# Patient Record
Sex: FEMALE | Race: WHITE | NOT HISPANIC OR LATINO | Employment: UNEMPLOYED | URBAN - METROPOLITAN AREA
[De-identification: names, ages, dates, MRNs, and addresses within clinical notes are randomized per-mention and may not be internally consistent; named-entity substitution may affect disease eponyms.]

---
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
## 2019-01-05 LAB — EKG IMPRESSION: NORMAL

## 2020-07-03 ENCOUNTER — HOSPITAL ENCOUNTER (EMERGENCY)
Facility: MEDICAL CENTER | Age: 20
End: 2020-07-04
Attending: EMERGENCY MEDICINE
Payer: COMMERCIAL

## 2020-07-03 ENCOUNTER — APPOINTMENT (OUTPATIENT)
Dept: RADIOLOGY | Facility: MEDICAL CENTER | Age: 20
End: 2020-07-03
Attending: EMERGENCY MEDICINE
Payer: COMMERCIAL

## 2020-07-03 DIAGNOSIS — N39.0 URINARY TRACT INFECTION WITH HEMATURIA, SITE UNSPECIFIED: Primary | ICD-10-CM

## 2020-07-03 DIAGNOSIS — R31.9 URINARY TRACT INFECTION WITH HEMATURIA, SITE UNSPECIFIED: Primary | ICD-10-CM

## 2020-07-03 LAB
APPEARANCE UR: ABNORMAL
BACTERIA #/AREA URNS HPF: ABNORMAL /HPF
BILIRUB UR QL STRIP.AUTO: NEGATIVE
CAOX CRY #/AREA URNS HPF: ABNORMAL /HPF
COLOR UR: YELLOW
EPI CELLS #/AREA URNS HPF: ABNORMAL /HPF
GLUCOSE UR STRIP.AUTO-MCNC: NEGATIVE MG/DL
HCG UR QL: NEGATIVE
HYALINE CASTS #/AREA URNS LPF: ABNORMAL /LPF
KETONES UR STRIP.AUTO-MCNC: ABNORMAL MG/DL
LEUKOCYTE ESTERASE UR QL STRIP.AUTO: ABNORMAL
MICRO URNS: ABNORMAL
NITRITE UR QL STRIP.AUTO: NEGATIVE
PH UR STRIP.AUTO: 5.5 [PH] (ref 5–8)
PROT UR QL STRIP: 30 MG/DL
RBC # URNS HPF: ABNORMAL /HPF
RBC UR QL AUTO: ABNORMAL
SP GR UR STRIP.AUTO: 1.03
UROBILINOGEN UR STRIP.AUTO-MCNC: 0.2 MG/DL
WBC #/AREA URNS HPF: ABNORMAL /HPF

## 2020-07-03 PROCEDURE — 96374 THER/PROPH/DIAG INJ IV PUSH: CPT

## 2020-07-03 PROCEDURE — 96375 TX/PRO/DX INJ NEW DRUG ADDON: CPT

## 2020-07-03 PROCEDURE — 36415 COLL VENOUS BLD VENIPUNCTURE: CPT

## 2020-07-03 PROCEDURE — 87086 URINE CULTURE/COLONY COUNT: CPT

## 2020-07-03 PROCEDURE — 81001 URINALYSIS AUTO W/SCOPE: CPT

## 2020-07-03 PROCEDURE — 99284 EMERGENCY DEPT VISIT MOD MDM: CPT

## 2020-07-03 PROCEDURE — 700111 HCHG RX REV CODE 636 W/ 250 OVERRIDE (IP): Performed by: EMERGENCY MEDICINE

## 2020-07-03 PROCEDURE — 81025 URINE PREGNANCY TEST: CPT

## 2020-07-03 RX ORDER — HYDROMORPHONE HYDROCHLORIDE 1 MG/ML
0.5 INJECTION, SOLUTION INTRAMUSCULAR; INTRAVENOUS; SUBCUTANEOUS ONCE
Status: COMPLETED | OUTPATIENT
Start: 2020-07-03 | End: 2020-07-03

## 2020-07-03 RX ADMIN — HYDROMORPHONE HYDROCHLORIDE 0.5 MG: 1 INJECTION, SOLUTION INTRAMUSCULAR; INTRAVENOUS; SUBCUTANEOUS at 22:23

## 2020-07-03 RX ADMIN — HYDROMORPHONE HYDROCHLORIDE 0.5 MG: 1 INJECTION, SOLUTION INTRAMUSCULAR; INTRAVENOUS; SUBCUTANEOUS at 22:47

## 2020-07-03 ASSESSMENT — FIBROSIS 4 INDEX: FIB4 SCORE: 0.36

## 2020-07-04 VITALS
HEIGHT: 66 IN | BODY MASS INDEX: 18.49 KG/M2 | OXYGEN SATURATION: 99 % | TEMPERATURE: 98.7 F | RESPIRATION RATE: 20 BRPM | HEART RATE: 96 BPM | WEIGHT: 115.08 LBS | DIASTOLIC BLOOD PRESSURE: 75 MMHG | SYSTOLIC BLOOD PRESSURE: 149 MMHG

## 2020-07-04 PROCEDURE — 700102 HCHG RX REV CODE 250 W/ 637 OVERRIDE(OP): Performed by: EMERGENCY MEDICINE

## 2020-07-04 PROCEDURE — 700111 HCHG RX REV CODE 636 W/ 250 OVERRIDE (IP): Performed by: EMERGENCY MEDICINE

## 2020-07-04 PROCEDURE — A9270 NON-COVERED ITEM OR SERVICE: HCPCS | Performed by: EMERGENCY MEDICINE

## 2020-07-04 PROCEDURE — 74176 CT ABD & PELVIS W/O CONTRAST: CPT

## 2020-07-04 RX ORDER — SULFAMETHOXAZOLE AND TRIMETHOPRIM 800; 160 MG/1; MG/1
2 TABLET ORAL ONCE
Status: COMPLETED | OUTPATIENT
Start: 2020-07-04 | End: 2020-07-04

## 2020-07-04 RX ORDER — KETOROLAC TROMETHAMINE 30 MG/ML
15 INJECTION, SOLUTION INTRAMUSCULAR; INTRAVENOUS ONCE
Status: COMPLETED | OUTPATIENT
Start: 2020-07-04 | End: 2020-07-04

## 2020-07-04 RX ORDER — SULFAMETHOXAZOLE AND TRIMETHOPRIM 800; 160 MG/1; MG/1
1 TABLET ORAL 2 TIMES DAILY
Qty: 20 TAB | Refills: 0 | Status: SHIPPED | OUTPATIENT
Start: 2020-07-04 | End: 2020-07-14

## 2020-07-04 RX ADMIN — KETOROLAC TROMETHAMINE 15 MG: 30 INJECTION, SOLUTION INTRAMUSCULAR at 00:40

## 2020-07-04 RX ADMIN — SULFAMETHOXAZOLE AND TRIMETHOPRIM 2 TABLET: 800; 160 TABLET ORAL at 00:39

## 2020-07-04 ASSESSMENT — ENCOUNTER SYMPTOMS
WHEEZING: 0
HALLUCINATIONS: 0
DIARRHEA: 0
HEARTBURN: 0
WEIGHT LOSS: 0
SHORTNESS OF BREATH: 0
DIZZINESS: 0
VOMITING: 0
SINUS PAIN: 0
SENSORY CHANGE: 0
BLURRED VISION: 0
FALLS: 0
FEVER: 0
WEAKNESS: 0
PALPITATIONS: 0
FOCAL WEAKNESS: 0
HEMOPTYSIS: 0
ORTHOPNEA: 0
SORE THROAT: 0
CONSTIPATION: 0
HEADACHES: 1
NERVOUS/ANXIOUS: 1
COUGH: 0
CHILLS: 0
MYALGIAS: 0
TREMORS: 0
ABDOMINAL PAIN: 0
NECK PAIN: 0
DIAPHORESIS: 0
FLANK PAIN: 1
SPUTUM PRODUCTION: 0
DOUBLE VISION: 0
TINGLING: 0
BACK PAIN: 1
SEIZURES: 0
NAUSEA: 0

## 2020-07-04 ASSESSMENT — LIFESTYLE VARIABLES: SUBSTANCE_ABUSE: 1

## 2020-07-04 NOTE — ED NOTES
Pt reports pain has decreased a little, currently 7/10. Pt remains restless, tearful. ERP informed. Further orders received. Pt medicated per ERP orders.

## 2020-07-04 NOTE — ED NOTES
Pt calm, resting quietly with friend at BS. Pt denies needs at this time. Updated on POC. Awaiting CT. Call light in reach.

## 2020-07-04 NOTE — ED NOTES
Discharge instructions provided with prescription teaching, instructed not to drive, pt verbalizes understanding. Pt is awake, alert, VSS. Pt ambulatory with steady gait out of ER with friend.

## 2020-07-04 NOTE — ED PROVIDER NOTES
ED Provider Note   7/4/2020  12:32 AM    Means of Arrival: Walk In  History obtained by: patient  Limitations:none    CHIEF COMPLAINT  Chief Complaint   Patient presents with   • Low Back Pain     Sudden onset lower back pain starting 20 minutes ago. hx: kidney stone, kidney infections       HPI  Alecia Keller is a 19 y.o. female with past medical history of kidney stone, urinary tract infections, asthma , anxiety who presents to the emergency department with sudden onset low back pain that started around half an hour before arriving to the ED. patient in acute distress due to the back pain, states that left side flank pain more than the right.  She states that she has a previous history of kidney infection and kidney stones.  Denies dysuria or lower abdominal pain.  Denies nausea or vomiting taking oral contraceptives 2 months back and has last mental cycle a week before.  Denies pregnancy.  No chest pain, shortness of breath or history of STDs.    Patient has a history of alcohol use, last drink this morning 3 shots of tequila.  She also smokes marijuana almost every day and smokes around half a pack of cigarettes.    REVIEW OF SYSTEMS  Review of Systems   Constitutional: Negative for chills, diaphoresis, fever and weight loss.   HENT: Negative for congestion, hearing loss, sinus pain and sore throat.    Eyes: Negative for blurred vision and double vision.   Respiratory: Negative for cough, hemoptysis, sputum production, shortness of breath and wheezing.    Cardiovascular: Negative for chest pain, palpitations, orthopnea and leg swelling.   Gastrointestinal: Negative for abdominal pain, constipation, diarrhea, heartburn, nausea and vomiting.   Genitourinary: Positive for flank pain and urgency. Negative for dysuria, frequency and hematuria.   Musculoskeletal: Positive for back pain. Negative for falls, joint pain, myalgias and neck pain.   Skin: Negative for itching and rash.   Neurological: Positive for  "headaches. Negative for dizziness, tingling, tremors, sensory change, focal weakness, seizures and weakness.   Psychiatric/Behavioral: Positive for substance abuse. Negative for hallucinations and suicidal ideas. The patient is nervous/anxious.      See HPI for further details.     PAST MEDICAL HISTORY   has a past medical history of Allergy, Anxiety, ASTHMA (11/3/2011), Asthma, Pleurisy (2008), and Pneumonia (2008).    SOCIAL HISTORY  Social History     Tobacco Use   • Smoking status: Current Every Day Smoker     Packs/day: 1.00     Years: 0.50     Pack years: 0.50     Types: Cigarettes   • Smokeless tobacco: Never Used   Substance and Sexual Activity   • Alcohol use: Yes   • Drug use: Yes     Types: Marijuana, Methamphetamines, Cocaine     Comment: cocaine, weed   • Sexual activity: Yes     Partners: Male     Birth control/protection: Pill, Condom       SURGICAL HISTORY  patient denies any surgical history    CURRENT MEDICATIONS  Home Medications     Reviewed by Jason Pinzon R.N. (Registered Nurse) on 07/03/20 at 2159  Med List Status: Partial   Medication Last Dose Status   albuterol 108 (90 Base) MCG/ACT Aero Soln inhalation aerosol  Active   albuterol 108 (90 Base) MCG/ACT Aero Soln inhalation aerosol  Active   azithromycin (ZITHROMAX) 250 MG Tab  Active   betamethasone dipropionate (DIPROLENE) 0.05 % Cream  Active   NON SPECIFIED  Active   NS SOLN 60 mL with albuterol 2.5 mg/0.5 mL NEBU 5 mL  Active   predniSONE (DELTASONE) 20 MG Tab  Active                ALLERGIES  Allergies   Allergen Reactions   • Pcn [Penicillins] Hives   • Pcn [Penicillins]    • Pineapple        PHYSICAL EXAM  VITAL SIGNS: /75   Pulse 96   Temp 37.1 °C (98.7 °F) (Temporal)   Resp 20   Ht 1.676 m (5' 6\")   Wt 52.2 kg (115 lb 1.3 oz)   LMP 06/19/2020   SpO2 99%   BMI 18.57 kg/m²    Pulse ox interpretation: I interpret this pulse ox as normal.   Physical Exam   Constitutional: She is oriented to person, place, and time. "   Young well-nourished female, tearful and very anxious   HENT:   Head: Normocephalic and atraumatic.   Eyes: Pupils are equal, round, and reactive to light. EOM are normal.   Neck: Normal range of motion. Neck supple.   Cardiovascular: Regular rhythm, normal heart sounds and intact distal pulses.   No murmur heard.  Tachycardic   Pulmonary/Chest: Effort normal and breath sounds normal. No respiratory distress. She has no wheezes. She has no rales.   Abdominal: Soft. Bowel sounds are normal. She exhibits no distension. There is no abdominal tenderness. There is no rebound.   Musculoskeletal: Normal range of motion.         General: No tenderness or edema.      Comments: Patient -diffuse tenderness in low back.   CVA tenderness left more than right   Neurological: She is alert and oriented to person, place, and time. No cranial nerve deficit. Gait normal. Coordination normal.   Skin: Skin is warm.   Psychiatric:    anxious and tearful         COURSE & MEDICAL DECISION MAKING  Pertinent Labs & Imaging studies reviewed. (See chart for details)    12:32 AM This is an emergent evaluation of a 19 y.o., female who presents with sudden onset low back pain, left side more than the right.  Past medical history of urinary infections and kidney stones.. Physical exam significant for tenderness in low back with left CVA tenderness. the differential diagnosis includes but is not limited to urinary tract infection with pyelonephritis, renal stones, ruptured ovarian cyst, ectopic pregnancy. Ordered for urinalysis , pregnancy test, CT renal colic .  UA was positive for packed leukocytes, leukocyte esterase and bacteria along with calcium oxalate crystals.  Pregnancy test was negative.  CT renal did not show any kidney stones or hydronephrosis .patient is recommended further work-up and hospitalization for the treatment of urinary infection possible pyelonephritis patient refused to stay.patient is afebrile, tolerating oral fluids  and is stable.  Patient was given a dose of Toradol for pain .patient has allergy to penicillin so Bactrim DS was given in the ED. She is discharged to home with Bactrim DS twice daily  for total 10 days    Bell Keita M.D.      I independently evaluated the patient and repeated the important components of the history and physical. I discussed the management with the resident. I have reviewed and agree with the pertinent clinical information as above including history, exam, study findings and recommendations.    This was an emergent evaluation of a 19-year-old woman presenting with acute onset left sided flank pain.  She says that the pain started suddenly.  Different diagnosis included renal stone, ectopic pregnancy, ruptured ovarian cyst, pyelonephritis.  The UA was consistent with infection but given the acute onset of pain a CT was done to look for any signs of stones.  There was no signs of renal stones or nephrosis.  Is possible that she passed a stone because her urine had a significant amount of calcium oxalate crystals.  When I reevaluated her she told me she adamantly wanted to leave.  I was considering adding serum studies to further evaluate infectious presentation, possible sepsis.  Because of her strong will to leave the emergency department she was agreeable to take oral Bactrim.  She was given Bactrim DS prior to discharge.  She was given a prescription for Bactrim DS for total of 10 days.  She agrees to return if she developed fever, frequent vomiting or any other serious concerns.  Enzo Ricci II, M.D. 7/4/2020 4:07 AM       The patient will return for worsening symptoms and is stable at the time of discharge. The patient verbalizes understanding. Guidance was provided on appropriate use of medications including driving under the influence, overdose, and side effects.     FINAL IMPRESSION  1. Urinary tract infection with hematuria, site unspecified                 Electronically signed by:  Enzo Ricci II, M.D., 7/4/2020 12:32 AM

## 2020-07-04 NOTE — ED TRIAGE NOTES
"Alecia Keller  19 y.o. female  Chief Complaint   Patient presents with   • Low Back Pain     lower back pain starting 20 minutes ago. hx: kidney stone, kidney infections     Pt ambulated to triage with steady gait for above complaint.      Pt unable to sit still in triage, walking around the room reporting a lot of pain.     Pt denies travel outside [facility] in the past 14 days, and denies respiratory symptoms.     Charge RN notified of patient. Pt roomed to [place] [id number].     Blood Pressure: (unable/refused), Pulse: (!) 120, Respiration: 16, Temperature: 36.7 °C (98.1 °F), Height: 167.6 cm (5' 6\"), Weight: 52.2 kg (115 lb 1.3 oz), BMI (Calculated): 18.57, BSA (Calculated): 1.6, Pulse Oximetry: 97 %    "

## 2020-07-06 LAB
BACTERIA UR CULT: NORMAL
SIGNIFICANT IND 70042: NORMAL
SITE SITE: NORMAL
SOURCE SOURCE: NORMAL

## 2021-08-26 ENCOUNTER — HOSPITAL ENCOUNTER (EMERGENCY)
Facility: MEDICAL CENTER | Age: 21
End: 2021-08-26
Attending: EMERGENCY MEDICINE
Payer: COMMERCIAL

## 2021-08-26 VITALS
HEART RATE: 109 BPM | OXYGEN SATURATION: 98 % | DIASTOLIC BLOOD PRESSURE: 65 MMHG | SYSTOLIC BLOOD PRESSURE: 113 MMHG | WEIGHT: 110 LBS | TEMPERATURE: 98.4 F | HEIGHT: 66 IN | BODY MASS INDEX: 17.68 KG/M2 | RESPIRATION RATE: 18 BRPM

## 2021-08-26 DIAGNOSIS — L02.91 ABSCESS: ICD-10-CM

## 2021-08-26 DIAGNOSIS — L03.113 CELLULITIS OF RIGHT UPPER EXTREMITY: ICD-10-CM

## 2021-08-26 DIAGNOSIS — F19.90 IVDU (INTRAVENOUS DRUG USER): ICD-10-CM

## 2021-08-26 PROCEDURE — 700102 HCHG RX REV CODE 250 W/ 637 OVERRIDE(OP): Performed by: EMERGENCY MEDICINE

## 2021-08-26 PROCEDURE — 700101 HCHG RX REV CODE 250: Performed by: EMERGENCY MEDICINE

## 2021-08-26 PROCEDURE — 99283 EMERGENCY DEPT VISIT LOW MDM: CPT

## 2021-08-26 PROCEDURE — 303977 HCHG I & D

## 2021-08-26 PROCEDURE — A9270 NON-COVERED ITEM OR SERVICE: HCPCS | Performed by: EMERGENCY MEDICINE

## 2021-08-26 RX ORDER — SULFAMETHOXAZOLE AND TRIMETHOPRIM 800; 160 MG/1; MG/1
1 TABLET ORAL 2 TIMES DAILY
Qty: 20 TABLET | Refills: 0 | Status: SHIPPED | OUTPATIENT
Start: 2021-08-26 | End: 2021-09-05

## 2021-08-26 RX ORDER — SULFAMETHOXAZOLE AND TRIMETHOPRIM 800; 160 MG/1; MG/1
1 TABLET ORAL ONCE
Status: COMPLETED | OUTPATIENT
Start: 2021-08-26 | End: 2021-08-26

## 2021-08-26 RX ORDER — CEPHALEXIN 500 MG/1
500 CAPSULE ORAL ONCE
Status: COMPLETED | OUTPATIENT
Start: 2021-08-26 | End: 2021-08-26

## 2021-08-26 RX ORDER — LIDOCAINE HYDROCHLORIDE AND EPINEPHRINE BITARTRATE 20; .01 MG/ML; MG/ML
10 INJECTION, SOLUTION SUBCUTANEOUS ONCE
Status: COMPLETED | OUTPATIENT
Start: 2021-08-26 | End: 2021-08-26

## 2021-08-26 RX ORDER — CEPHALEXIN 500 MG/1
500 CAPSULE ORAL 4 TIMES DAILY
Qty: 40 CAPSULE | Refills: 0 | Status: SHIPPED | OUTPATIENT
Start: 2021-08-26 | End: 2021-09-05

## 2021-08-26 RX ADMIN — CEPHALEXIN 500 MG: 500 CAPSULE ORAL at 05:07

## 2021-08-26 RX ADMIN — SULFAMETHOXAZOLE AND TRIMETHOPRIM 1 TABLET: 800; 160 TABLET ORAL at 05:07

## 2021-08-26 RX ADMIN — LIDOCAINE HYDROCHLORIDE AND EPINEPHRINE 10 ML: 20; 10 INJECTION, SOLUTION INFILTRATION; PERINEURAL at 05:00

## 2021-08-26 NOTE — ED NOTES
Discharge teaching and paperwork provided. All questions/concerns answered. VSS, assessment stable. Given information regarding prescriptions. Patient discharged to the care of self and ambulated out of the ED with steady gait.

## 2021-08-26 NOTE — ED TRIAGE NOTES
"Alecia Keller  21 y.o. female  Chief Complaint   Patient presents with   • Abscess     R arm abscess; states \"i think its from the tattoo\"; indicates been present x2 weeks when \"I last injected heroin\"; tender to touch.       Vitals:    08/26/21 0258   BP: 112/80   Pulse: (!) 124   Resp: 20   Temp: 37.8 °C (100.1 °F)   SpO2: 96%        Patient ambulatory into triage for the complaint above or R arm abscess - pt reports that they think \"it happened when I got my tattoo.\" In triage pt indicated that they have been \"sober\" for 2 weeks; ironically abscess started when patient reports last using heroin and was injecting at this time. Pt also admits to doing meth, but only smoking meth. Pt reported that the abscess did appear to get smaller; presents today due to increasing pain and increasing size. Tattoo looks to be well healed and older than 2 weeks. Area is reddened and large.    Patient is in stable condition at time of triage, has been educated on the triage process and placed back into the ED lobby at this time - pt has verbalized understanding of this process.     RN encouraged patient to alert staff at front for any changes that may occur while they are waiting to be evaluated by an ERP.     Of note, this RN and patient are in proper PPE and has a mask in place at all times during this encounter.     Past Medical History:   Diagnosis Date   • Allergy    • Anxiety    • ASTHMA 11/3/2011   • Asthma    • Pleurisy 2008   • Pneumonia 2008        "

## 2021-08-26 NOTE — ED PROVIDER NOTES
"ED Provider Note    Scribed for Kel Cross M.D. by Josue Cortez-Reyes. 8/26/2021, 4:11 AM.    Primary care provider: JENNIFER Lanier  Means of arrival: Walk-in  History obtained from: Patient  History limited by: None    CHIEF COMPLAINT  Chief Complaint   Patient presents with    Abscess     R arm abscess; states \"i think its from the tattoo\"; indicates been present x2 weeks when \"I last injected heroin\"; tender to touch.       HPI  Alecia Keller is a 21 y.o. female who presents to the Emergency Department for evaluation of an abscess on her right forearm onset two weeks ago. The patient endorses associated pain noting that her pain radiates to her armpit. She declares that her abscess developed when she last injected heroin two weeks ago. The patient adds that she has been applying rubbing alcohol and hydrogen peroxide to the abscess with no alleviation of her symptoms.     REVIEW OF SYSTEMS  Pertinent positives include abscess to right forearm and associated pain.   Pertinent negatives include no other pain or illness.       PAST MEDICAL HISTORY   has a past medical history of Allergy, Anxiety, ASTHMA (11/3/2011), Asthma, Pleurisy (2008), and Pneumonia (2008).    SURGICAL HISTORY  patient denies any surgical history    SOCIAL HISTORY  Social History     Tobacco Use    Smoking status: Current Every Day Smoker     Packs/day: 1.00     Years: 0.50     Pack years: 0.50     Types: Cigarettes    Smokeless tobacco: Never Used   Vaping Use    Vaping Use: Never used   Substance Use Topics    Alcohol use: Yes    Drug use: Yes     Types: Marijuana, Methamphetamines, Cocaine, Intravenous, Inhaled     Comment: cocaine, weed, heroin, meth      Social History     Substance and Sexual Activity   Drug Use Yes    Types: Marijuana, Methamphetamines, Cocaine, Intravenous, Inhaled    Comment: cocaine, weed, heroin, meth       FAMILY HISTORY  Family History   Problem Relation Age of Onset    Other Mother         liver disease " "   Diabetes Father     Hypertension Father     Cancer Maternal Grandfather         mult myeloma    Heart Disease Neg Hx     Hyperlipidemia Neg Hx     Stroke Neg Hx        CURRENT MEDICATIONS  Home Medications       Reviewed by Prasanth Rapp R.N. (Registered Nurse) on 08/26/21 at 0319  Med List Status: Not Addressed     Medication Last Dose Status   albuterol 108 (90 Base) MCG/ACT Aero Soln inhalation aerosol  Active   albuterol 108 (90 Base) MCG/ACT Aero Soln inhalation aerosol  Active   azithromycin (ZITHROMAX) 250 MG Tab  Active   betamethasone dipropionate (DIPROLENE) 0.05 % Cream  Active   NON SPECIFIED  Active   NS SOLN 60 mL with albuterol 2.5 mg/0.5 mL NEBU 5 mL  Active   predniSONE (DELTASONE) 20 MG Tab  Active                    ALLERGIES  Allergies   Allergen Reactions    Pcn [Penicillins] Hives    Pcn [Penicillins]     Pineapple        PHYSICAL EXAM  VITAL SIGNS: /80   Pulse (!) 124   Temp 37.8 °C (100.1 °F) (Temporal)   Resp 20   Ht 1.676 m (5' 6\")   Wt 49.9 kg (110 lb)   LMP 07/26/2021 (Approximate)   SpO2 96%   BMI 17.75 kg/m²     Nursing note and vitals reviewed.  Constitutional: No distress.   HENT: Head is atraumatic. Oropharynx is moist.   Eyes: Conjunctivae are normal. Pupils are equal, round, and reactive to light.   Cardiovascular: Normal peripheral perfusion  Respiratory: No respiratory distress.   Musculoskeletal: Normal range of motion. Golf sized abscess to ulnar aspect of right forearm.   Neurological: Alert. No focal deficits noted.    Skin: No rash.   Psych: Appropriate for clinical situation       DIAGNOSTIC STUDIES / PROCEDURES    Incision and Drainage Procedure Note    Indication: Abscess    Procedure: The patient was positioned appropriately and the skin over the incision site was prepped with betadine and draped in a sterile fashion. Local anesthesia was obtained by infiltration using 2% Lidocaine with epinephrine.  An incision was then made over the center of the " lesion and approximately 5 cc of purulent material was expressed. Loculations were broken up using forceps and more of the material was able to be expressed. The drainage cavity was then packed with sterile gauze. The patient’s tetanus status was up to date and did not require a booster dose.    The patient tolerated the procedure well.    Complications: None        COURSE & MEDICAL DECISION MAKING  Nursing notes, VS, PMSFHx reviewed in chart.    4:11 AM - Patient seen and examined at bedside. Patient will be treated with lidocaine-epinephrine 2 %. I informed the patient of my plan to drain her abscess. Patient verbalizes understanding and agreement to this plan of care.     4:52 AM - Incision and drainage procedure performed by me as outlined in the procedure not above. I informed the patient of my plan to treat her with antibiotics. I discussed plan for discharge and follow up as outlined below. The patient verbalizes they feel comfortable going home. The patient is stable for discharge at this time and will return for any new or worsening symptoms. Patient verbalizes understanding and support with my plan for discharge.       Patient will be discharged home with a prescription for Bactrim and Keflex.    DISPOSITION:  Patient will be discharged home in stable condition.    FOLLOW UP:  [facility], Emergency Dept  [address] [place] [id number] [phone]    If symptoms worsen    [facility], Emergency Dept  [address] [place] [id number] [phone]  In 2 days  For wound re-check    CAIO Lanier.  [address] [place] [id number] [phone]    Schedule an appointment as soon as possible for a visit         The patient was discharged home with an information sheet on abscess and told to return immediately for any signs or symptoms listed.  The patient agreed to the discharge precautions and follow-up plan which is documented in  EPIC.    FINAL IMPRESSION  1. Abscess    2. Cellulitis of right upper extremity    3. IVDU (intravenous drug user)          I, Josue Cortez-Reyes (Scribe), am scribing for, and in the presence of, Kel Cross M.D..    Electronically signed by: Josue Cortez-Reyes (Scribe), 8/26/2021    IKel M.D. personally performed the services described in this documentation, as scribed by Josue Cortez-Reyes in my presence, and it is both accurate and complete. E.     The note accurately reflects work and decisions made by me.  Kel Cross M.D.  8/26/2021  10:53 AM

## 2022-05-24 ENCOUNTER — HOSPITAL ENCOUNTER (EMERGENCY)
Facility: MEDICAL CENTER | Age: 22
End: 2022-05-24
Attending: EMERGENCY MEDICINE
Payer: COMMERCIAL

## 2022-05-24 VITALS
SYSTOLIC BLOOD PRESSURE: 115 MMHG | HEIGHT: 66 IN | DIASTOLIC BLOOD PRESSURE: 72 MMHG | HEART RATE: 89 BPM | BODY MASS INDEX: 20.41 KG/M2 | TEMPERATURE: 99 F | WEIGHT: 127 LBS | RESPIRATION RATE: 8 BRPM | OXYGEN SATURATION: 96 %

## 2022-05-24 DIAGNOSIS — F15.10 AMPHETAMINE ABUSE (HCC): ICD-10-CM

## 2022-05-24 LAB
ALBUMIN SERPL BCP-MCNC: 4 G/DL (ref 3.2–4.9)
ALBUMIN/GLOB SERPL: 1.7 G/DL
ALP SERPL-CCNC: 83 U/L (ref 30–99)
ALT SERPL-CCNC: 43 U/L (ref 2–50)
ANION GAP SERPL CALC-SCNC: 10 MMOL/L (ref 7–16)
AST SERPL-CCNC: 47 U/L (ref 12–45)
BILIRUB SERPL-MCNC: 0.2 MG/DL (ref 0.1–1.5)
BUN SERPL-MCNC: 14 MG/DL (ref 8–22)
CALCIUM SERPL-MCNC: 8.6 MG/DL (ref 8.5–10.5)
CHLORIDE SERPL-SCNC: 107 MMOL/L (ref 96–112)
CO2 SERPL-SCNC: 24 MMOL/L (ref 20–33)
CREAT SERPL-MCNC: 0.91 MG/DL (ref 0.5–1.4)
GFR SERPLBLD CREATININE-BSD FMLA CKD-EPI: 91 ML/MIN/1.73 M 2
GLOBULIN SER CALC-MCNC: 2.3 G/DL (ref 1.9–3.5)
GLUCOSE SERPL-MCNC: 88 MG/DL (ref 65–99)
HCG SERPL QL: NEGATIVE
POTASSIUM SERPL-SCNC: 3.9 MMOL/L (ref 3.6–5.5)
PROT SERPL-MCNC: 6.3 G/DL (ref 6–8.2)
SODIUM SERPL-SCNC: 141 MMOL/L (ref 135–145)

## 2022-05-24 PROCEDURE — 99285 EMERGENCY DEPT VISIT HI MDM: CPT

## 2022-05-24 PROCEDURE — 36415 COLL VENOUS BLD VENIPUNCTURE: CPT

## 2022-05-24 PROCEDURE — 80053 COMPREHEN METABOLIC PANEL: CPT

## 2022-05-24 PROCEDURE — 700105 HCHG RX REV CODE 258: Performed by: EMERGENCY MEDICINE

## 2022-05-24 PROCEDURE — 84703 CHORIONIC GONADOTROPIN ASSAY: CPT

## 2022-05-24 RX ORDER — SODIUM CHLORIDE 9 MG/ML
1000 INJECTION, SOLUTION INTRAVENOUS ONCE
Status: COMPLETED | OUTPATIENT
Start: 2022-05-24 | End: 2022-05-24

## 2022-05-24 RX ADMIN — SODIUM CHLORIDE 1000 ML: 9 INJECTION, SOLUTION INTRAVENOUS at 12:09

## 2022-05-24 NOTE — ED PROVIDER NOTES
ED Provider Note    CHIEF COMPLAINT  Chief Complaint   Patient presents with   • Drug Overdose     Pt states she smoked some fentanyl today and was found on the ground in the bathroom at the [facility]. No narcan was given. Pt AOx4, maintaining airway. Hx of drug abuse. Also admits to using meth today.        HPI  Alecia Keller is a 21 y.o. female who presents for evaluation of not feeling well today agitation.  The [place]  She apparently admitted to smoking which she thought was either methamphetamine or fentanyl.  No Narcan was administered in the field.  She denies chest pain or any strokelike symptoms.  She has a history of polysubstance abuse.  She denies fever chills or productive cough.    REVIEW OF SYSTEMS  See HPI for further details.  No night sweats weight loss numbness tingling weakness rash all other systems are negative.     PAST MEDICAL HISTORY  Past Medical History:   Diagnosis Date   • ASTHMA 11/3/2011   • Pleurisy 2008   • Pneumonia 2008   • Allergy    • Anxiety    • Asthma        FAMILY HISTORY  Noncontributory    SOCIAL HISTORY  Social History     Socioeconomic History   • Marital status: Single   Tobacco Use   • Smoking status: Current Every Day Smoker     Packs/day: 1.00     Years: 0.50     Pack years: 0.50     Types: Cigarettes   • Smokeless tobacco: Never Used   Vaping Use   • Vaping Use: Never used   Substance and Sexual Activity   • Alcohol use: Yes   • Drug use: Yes     Types: Marijuana, Methamphetamines, Cocaine, Intravenous, Inhaled     Comment: cocaine, weed, heroin, meth, fentanyl   • Sexual activity: Yes     Partners: Male     Birth control/protection: Pill, Condom   Other Topics Concern   • Bike safety Yes   Social History Narrative    ** Merged History Encounter **          Extensive history of polysubstance abuse  SURGICAL HISTORY  No past surgical history on file.    CURRENT MEDICATIONS  Home Medications     Reviewed by Deisy VELARDE  "LILLY Martinez (Registered Nurse) on 05/24/22 at 1135  Med List Status: Not Addressed   Medication Last Dose Status   albuterol 108 (90 Base) MCG/ACT Aero Soln inhalation aerosol  Active   albuterol 108 (90 Base) MCG/ACT Aero Soln inhalation aerosol  Active   azithromycin (ZITHROMAX) 250 MG Tab  Active   betamethasone dipropionate (DIPROLENE) 0.05 % Cream  Active   NON SPECIFIED  Active   NS SOLN 60 mL with albuterol 2.5 mg/0.5 mL NEBU 5 mL  Active   predniSONE (DELTASONE) 20 MG Tab  Active                ALLERGIES  Allergies   Allergen Reactions   • Pcn [Penicillins] Hives   • Pcn [Penicillins]    • Pineapple        PHYSICAL EXAM  VITAL SIGNS: /66   Pulse 78   Temp 37.2 °C (98.9 °F) (Temporal)   Resp (!) 8   Ht 1.676 m (5' 6\")   Wt 57.6 kg (127 lb)   SpO2 94%   BMI 20.50 kg/m²       Constitutional: Disheveled agitated  HENT: Normocephalic, Atraumatic, Bilateral external ears normal, Oropharynx moist, No oral exudates, Nose normal.   Eyes: PERRLA, EOMI, Conjunctiva normal, No discharge.   Neck: Normal range of motion, No tenderness, Supple, No stridor.   Cardiovascular: Mild tachycardia normal rhythm, No murmurs, No rubs, No gallops.   Thorax & Lungs: Normal breath sounds, No respiratory distress, No wheezing, No chest tenderness.   Abdomen: Bowel sounds normal, Soft, No tenderness, No masses, No pulsatile masses.   Skin: Warm, Dry, No erythema, No rash.   Back: No tenderness, No CVA tenderness.   Extremities: Intact distal pulses, No edema, No tenderness, No cyanosis, No clubbing.   Neurologic: Cranial nerves II through XII grossly intact alert & oriented x 3, Normal motor function, Normal sensory function, No focal deficits noted.   Psychiatric: Agitated but denies homicidality or suicidality does not appear to be acutely psychotic.       COURSE & MEDICAL DECISION MAKING  Pertinent Labs & Imaging studies reviewed. (See chart for details)  Results for orders placed or performed during the hospital " encounter of 05/24/22   Comp Metabolic Panel   Result Value Ref Range    Sodium 141 135 - 145 mmol/L    Potassium 3.9 3.6 - 5.5 mmol/L    Chloride 107 96 - 112 mmol/L    Co2 24 20 - 33 mmol/L    Anion Gap 10.0 7.0 - 16.0    Glucose 88 65 - 99 mg/dL    Bun 14 8 - 22 mg/dL    Creatinine 0.91 0.50 - 1.40 mg/dL    Calcium 8.6 8.5 - 10.5 mg/dL    AST(SGOT) 47 (H) 12 - 45 U/L    ALT(SGPT) 43 2 - 50 U/L    Alkaline Phosphatase 83 30 - 99 U/L    Total Bilirubin 0.2 0.1 - 1.5 mg/dL    Albumin 4.0 3.2 - 4.9 g/dL    Total Protein 6.3 6.0 - 8.2 g/dL    Globulin 2.3 1.9 - 3.5 g/dL    A-G Ratio 1.7 g/dL   HCG QUAL SERUM   Result Value Ref Range    Beta-Hcg Qualitative Serum Negative Negative   ESTIMATED GFR   Result Value Ref Range    GFR (CKD-EPI) 91 >60 mL/min/1.73 m 2     Patient was observed for several hours on cardiac and pulse oximetry monitoring.  She never had any episodes of desaturation a tacky dysrhythmia.  Metabolic panel and pregnancy are unremarkable.  She was repeatedly evaluated and did not endorse any homicidality or suicidality.  She admits to polysubstance abuse.  At the time of discharge the patient is alert and oriented x4 and ambulatory.  To be discharged to her own care    FINAL IMPRESSION  1.  Methamphetamine abuse         Electronically signed by: Francois Ross M.D., 5/24/2022 11:50 AM

## 2022-05-24 NOTE — ED NOTES
Patient remains drowsy and will open eyes for a few seconds. VSS on monitor, will continue to assess

## 2022-05-24 NOTE — ED TRIAGE NOTES
"Chief Complaint   Patient presents with   • Drug Overdose     Pt states she smoked some fentanyl today and was found on the ground in the bathroom at the [facility]. No narcan was given. Pt AOx4, maintaining airway. Hx of drug abuse. Also admits to using meth today.      /82   Pulse 97   Temp 37.2 °C (98.9 °F) (Temporal)   Resp 20   Ht 1.676 m (5' 6\")   Wt 57.6 kg (127 lb)   SpO2 96%   BMI 20.50 kg/m²     Pt brought in by [facility] to [facility] 26, mask in place. Pt in a gown and on monitor. Chart flagged for ERP to see.  "

## 2022-05-25 NOTE — ED NOTES
Pt has discharge orders. Pt educated on discharge instructions.  Pt verbalizes understanding.  PIV removed. Pt ambulatory to lobby. Pt going home with mother.

## 2023-02-17 ENCOUNTER — HOSPITAL ENCOUNTER (EMERGENCY)
Facility: MEDICAL CENTER | Age: 23
End: 2023-02-17
Attending: EMERGENCY MEDICINE
Payer: COMMERCIAL

## 2023-02-17 VITALS
HEART RATE: 84 BPM | DIASTOLIC BLOOD PRESSURE: 79 MMHG | RESPIRATION RATE: 16 BRPM | WEIGHT: 127 LBS | TEMPERATURE: 98 F | SYSTOLIC BLOOD PRESSURE: 130 MMHG | HEIGHT: 66 IN | OXYGEN SATURATION: 99 % | BODY MASS INDEX: 20.41 KG/M2

## 2023-02-17 DIAGNOSIS — K04.7 INFECTED DENTAL CARIES: ICD-10-CM

## 2023-02-17 DIAGNOSIS — K08.89 PAIN, DENTAL: ICD-10-CM

## 2023-02-17 DIAGNOSIS — K02.9 INFECTED DENTAL CARIES: ICD-10-CM

## 2023-02-17 PROCEDURE — A9270 NON-COVERED ITEM OR SERVICE: HCPCS | Performed by: EMERGENCY MEDICINE

## 2023-02-17 PROCEDURE — 99283 EMERGENCY DEPT VISIT LOW MDM: CPT

## 2023-02-17 PROCEDURE — 700102 HCHG RX REV CODE 250 W/ 637 OVERRIDE(OP): Performed by: EMERGENCY MEDICINE

## 2023-02-17 RX ORDER — IBUPROFEN 600 MG/1
600 TABLET ORAL ONCE
Status: COMPLETED | OUTPATIENT
Start: 2023-02-17 | End: 2023-02-17

## 2023-02-17 RX ORDER — CLINDAMYCIN HYDROCHLORIDE 300 MG/1
300 CAPSULE ORAL 3 TIMES DAILY
Qty: 21 CAPSULE | Refills: 0 | Status: ACTIVE | OUTPATIENT
Start: 2023-02-17 | End: 2023-02-24

## 2023-02-17 RX ORDER — CLINDAMYCIN HYDROCHLORIDE 150 MG/1
300 CAPSULE ORAL ONCE
Status: COMPLETED | OUTPATIENT
Start: 2023-02-17 | End: 2023-02-17

## 2023-02-17 RX ADMIN — CLINDAMYCIN HYDROCHLORIDE 300 MG: 150 CAPSULE ORAL at 09:09

## 2023-02-17 RX ADMIN — IBUPROFEN 600 MG: 600 TABLET, FILM COATED ORAL at 09:09

## 2023-02-17 ASSESSMENT — PAIN DESCRIPTION - PAIN TYPE
TYPE: ACUTE PAIN
TYPE: ACUTE PAIN

## 2023-02-17 ASSESSMENT — FIBROSIS 4 INDEX: FIB4 SCORE: 0.43

## 2023-02-17 NOTE — ED PROVIDER NOTES
"ED Provider Note    CHIEF COMPLAINT  Chief Complaint   Patient presents with    Dental Pain     X1 week - bottom left side       EXTERNAL RECORDS REVIEWED  Other acute pain, no prior recently for the same    HPI/ROS  LIMITATION TO HISTORY   Select: : None  OUTSIDE HISTORIAN(S):  Friend      Alecia Keller is a 22 y.o. female who presents to the emergency department for dental pain.  Patient describes left low molar pain progressive this week.  Radiating to the left side of her face.  Throbbing.  Decreased appetite but tolerating food and fluids.  No neck pain or stiffness.  No difficulty swallowing or breathing.  Nausea without vomiting.  Tactile fever and chills.    Patient has an appointment [facility] on March 24.      Allergic to penicillins, rash \"hives.\"  No longer using drugs, 7 months clean and sober.  Denies pregnancy.    PAST MEDICAL HISTORY   has a past medical history of Allergy, Anxiety, ASTHMA (11/3/2011), Asthma, Pleurisy (2008), and Pneumonia (2008).    SURGICAL HISTORY  patient denies any surgical history    FAMILY HISTORY  Family History   Problem Relation Age of Onset    Other Mother         liver disease    Diabetes Father     Hypertension Father     Cancer Maternal Grandfather         mult myeloma    Heart Disease Neg Hx     Hyperlipidemia Neg Hx     Stroke Neg Hx        SOCIAL HISTORY  Social History     Tobacco Use    Smoking status: Every Day     Packs/day: 1.00     Years: 0.50     Pack years: 0.50     Types: Cigarettes    Smokeless tobacco: Never   Vaping Use    Vaping Use: Never used   Substance and Sexual Activity    Alcohol use: Yes    Drug use: Yes     Types: Marijuana, Methamphetamines, Cocaine, Intravenous, Inhaled     Comment: cocaine, weed, heroin, meth, fentanyl    Sexual activity: Yes     Partners: Male     Birth control/protection: Pill, Condom       CURRENT MEDICATIONS  Home Medications       Reviewed by Bri Aldrich R.N. (Registered Nurse) on 02/17/23 at 0830  Med List " "Status: Not Addressed     Medication Last Dose Status   albuterol 108 (90 Base) MCG/ACT Aero Soln inhalation aerosol  Active   albuterol 108 (90 Base) MCG/ACT Aero Soln inhalation aerosol  Active   azithromycin (ZITHROMAX) 250 MG Tab  Active   betamethasone dipropionate (DIPROLENE) 0.05 % Cream  Active   NON SPECIFIED  Active   NS SOLN 60 mL with albuterol 2.5 mg/0.5 mL NEBU 5 mL  Active   predniSONE (DELTASONE) 20 MG Tab  Active                    ALLERGIES  Allergies   Allergen Reactions    Pcn [Penicillins] Hives    Pcn [Penicillins]     Pineapple        PHYSICAL EXAM  VITAL SIGNS: /78   Pulse 91   Temp 37.2 °C (98.9 °F) (Temporal)   Resp 16   Ht 1.676 m (5' 6\")   Wt 57.6 kg (127 lb)   LMP 01/15/2023 Comment: BCP  SpO2 99%   BMI 20.50 kg/m²    Pulse ox interpretation: I interpret this pulse ox as normal.  Constitutional: Alert.  Mild discomfort.  Tearful.  HENT: Normocephalic, atraumatic. Bilateral external ears normal, Nose normal. Moist mucous membranes.  Left lower rear molar obvious dental carry, necrosis.  Tender to percussion.  No gingival swelling or fluctuance.  No lingual elevation.  No facial swelling or erythema.  No trismus.  No cervical or submandibular lymphadenopathy.  Eyes: Pupils are equal and reactive, Conjunctiva normal.   Neck: Normal range of motion, Supple.  No meningeal irritation.  Lymphatic: No lymphadenopathy noted.   Cardiovascular: Normal peripheral perfusion  Thorax & Lungs: Nonlabored respirations  Skin: Warm, Dry  Musculoskeletal: Good range of motion in all major joints. No major deformities noted.   Neurologic: Alert and orient x4.  Speech clear and cohesive.  Ambulates independently  Psychiatric: Flat affect, cooperative    COURSE & MEDICAL DECISION MAKING    ED Observation Status? No; Patient does not meet criteria for ED Observation.     INITIAL ASSESSMENT, COURSE AND PLAN  Care Narrative:     ED evaluation most consistent with infected dental caries, suspect " apical dental abscess.  No clinical evidence for gingival or other oral abscess.  No Chema's.  No meningitis or suspicion for retropharyngeal abscess.  No facial cellulitis.  Hemodynamically stable without fever, tachycardia, hypotension.  Tolerated oral medications without difficulty.  First dose clindamycin (penicillin allergy) in the emergency department as well as Motrin.    ADDITIONAL PROBLEM LIST  History of IV drug use, 7 months clean and sober.    DISPOSITION AND DISCUSSIONS  Patient stable for discharge home, anticipatory guidance provided, clindamycin for 1 week, Tylenol or ibuprofen for discomfort, close follow-up with a dentist is encouraged (encouraged to call [facility] for a cancellation earlier appointment, she has been referred to oral surgery as well) and strict ED return instructions have been detailed.  Patient and her friend are aware of the findings and agreeable to the disposition and plan.    Decision tools and prescription drugs considered including, but not limited to: Pain Medications Tylenol and ibuprofen are appropriate in the setting .    FINAL DIAGNOSIS  1. Pain, dental    2. Infected dental caries           Electronically signed by: Manjula Huddleston D.O., 2/17/2023 8:52 AM

## 2023-02-17 NOTE — DISCHARGE INSTRUCTIONS
Follow-up with a dentist as soon as possible for reevaluation and definitive treatment, extraction if indicated.  Call Dr. Mar's office, reference this emergency department visit and schedule an appointment for follow-up.    Clindamycin 3 times daily for 7 days for suspected dental infection.  Tylenol and ibuprofen, alternating if needed, as needed for fever or discomfort.    Encourage oral fluid hydration.  Room temperature, soft diet as tolerated.    Return to the emergency department for persistent worsening dental pain, oral or facial swelling, difficulty swallowing or breathing, neck pain or stiffness, fever, vomiting or other new concerns.

## 2023-02-17 NOTE — ED TRIAGE NOTES
"Chief Complaint   Patient presents with    Dental Pain     X1 week - bottom left side     Pt is alert and oriented, speaking in full sentences, follows commands and responds appropriately to questions.      Pt placed in lobby. Pt educated on triage process and apologized for wait time. Pt encouraged to alert staff for any changes.     Patient and staff wearing appropriate PPE      /78   Pulse 91   Temp 37.2 °C (98.9 °F) (Temporal)   Resp 16   Ht 1.676 m (5' 6\")   Wt 57.6 kg (127 lb)   SpO2 99%       "

## 2023-02-17 NOTE — ED NOTES
Discharge paperwork provided, education provided by Dr. Huddleston. All questions and concerns addressed by Dr. Huddleston. Pt ambulatory out of ER with all belongings, and steady gait.

## 2024-03-19 ENCOUNTER — HOSPITAL ENCOUNTER (EMERGENCY)
Facility: MEDICAL CENTER | Age: 24
End: 2024-03-19
Attending: EMERGENCY MEDICINE
Payer: COMMERCIAL

## 2024-03-19 ENCOUNTER — APPOINTMENT (OUTPATIENT)
Dept: RADIOLOGY | Facility: MEDICAL CENTER | Age: 24
End: 2024-03-19
Attending: EMERGENCY MEDICINE
Payer: COMMERCIAL

## 2024-03-19 VITALS
RESPIRATION RATE: 17 BRPM | WEIGHT: 140 LBS | BODY MASS INDEX: 23.9 KG/M2 | TEMPERATURE: 98.2 F | SYSTOLIC BLOOD PRESSURE: 114 MMHG | OXYGEN SATURATION: 91 % | HEART RATE: 99 BPM | HEIGHT: 64 IN | DIASTOLIC BLOOD PRESSURE: 59 MMHG

## 2024-03-19 DIAGNOSIS — J45.21 INTERMITTENT ASTHMA WITH ACUTE EXACERBATION, UNSPECIFIED ASTHMA SEVERITY: ICD-10-CM

## 2024-03-19 LAB
ALBUMIN SERPL BCP-MCNC: 3.8 G/DL (ref 3.2–4.9)
ALBUMIN/GLOB SERPL: 1.1 G/DL
ALP SERPL-CCNC: 74 U/L (ref 30–99)
ALT SERPL-CCNC: 15 U/L (ref 2–50)
ANION GAP SERPL CALC-SCNC: 12 MMOL/L (ref 7–16)
AST SERPL-CCNC: 15 U/L (ref 12–45)
BASOPHILS # BLD AUTO: 0.7 % (ref 0–1.8)
BASOPHILS # BLD: 0.07 K/UL (ref 0–0.12)
BILIRUB SERPL-MCNC: 0.3 MG/DL (ref 0.1–1.5)
BUN SERPL-MCNC: 9 MG/DL (ref 8–22)
CALCIUM ALBUM COR SERPL-MCNC: 9.1 MG/DL (ref 8.5–10.5)
CALCIUM SERPL-MCNC: 8.9 MG/DL (ref 8.5–10.5)
CHLORIDE SERPL-SCNC: 105 MMOL/L (ref 96–112)
CO2 SERPL-SCNC: 23 MMOL/L (ref 20–33)
CREAT SERPL-MCNC: 0.73 MG/DL (ref 0.5–1.4)
EOSINOPHIL # BLD AUTO: 0.91 K/UL (ref 0–0.51)
EOSINOPHIL NFR BLD: 9.1 % (ref 0–6.9)
ERYTHROCYTE [DISTWIDTH] IN BLOOD BY AUTOMATED COUNT: 41 FL (ref 35.9–50)
FLUAV RNA SPEC QL NAA+PROBE: NEGATIVE
FLUBV RNA SPEC QL NAA+PROBE: NEGATIVE
GFR SERPLBLD CREATININE-BSD FMLA CKD-EPI: 117 ML/MIN/1.73 M 2
GLOBULIN SER CALC-MCNC: 3.4 G/DL (ref 1.9–3.5)
GLUCOSE SERPL-MCNC: 84 MG/DL (ref 65–99)
HCT VFR BLD AUTO: 40 % (ref 37–47)
HGB BLD-MCNC: 13.8 G/DL (ref 12–16)
IMM GRANULOCYTES # BLD AUTO: 0.02 K/UL (ref 0–0.11)
IMM GRANULOCYTES NFR BLD AUTO: 0.2 % (ref 0–0.9)
LACTATE SERPL-SCNC: 1.1 MMOL/L (ref 0.5–2)
LYMPHOCYTES # BLD AUTO: 3.49 K/UL (ref 1–4.8)
LYMPHOCYTES NFR BLD: 35 % (ref 22–41)
MCH RBC QN AUTO: 28.4 PG (ref 27–33)
MCHC RBC AUTO-ENTMCNC: 34.5 G/DL (ref 32.2–35.5)
MCV RBC AUTO: 82.3 FL (ref 81.4–97.8)
MONOCYTES # BLD AUTO: 0.62 K/UL (ref 0–0.85)
MONOCYTES NFR BLD AUTO: 6.2 % (ref 0–13.4)
NEUTROPHILS # BLD AUTO: 4.86 K/UL (ref 1.82–7.42)
NEUTROPHILS NFR BLD: 48.8 % (ref 44–72)
NRBC # BLD AUTO: 0 K/UL
NRBC BLD-RTO: 0 /100 WBC (ref 0–0.2)
PLATELET # BLD AUTO: 329 K/UL (ref 164–446)
PMV BLD AUTO: 10.4 FL (ref 9–12.9)
POTASSIUM SERPL-SCNC: 3.5 MMOL/L (ref 3.6–5.5)
PROT SERPL-MCNC: 7.2 G/DL (ref 6–8.2)
RBC # BLD AUTO: 4.86 M/UL (ref 4.2–5.4)
RSV RNA SPEC QL NAA+PROBE: NEGATIVE
SARS-COV-2 RNA RESP QL NAA+PROBE: NOTDETECTED
SODIUM SERPL-SCNC: 140 MMOL/L (ref 135–145)
WBC # BLD AUTO: 10 K/UL (ref 4.8–10.8)

## 2024-03-19 PROCEDURE — 36415 COLL VENOUS BLD VENIPUNCTURE: CPT

## 2024-03-19 PROCEDURE — 96375 TX/PRO/DX INJ NEW DRUG ADDON: CPT

## 2024-03-19 PROCEDURE — 85025 COMPLETE CBC W/AUTO DIFF WBC: CPT

## 2024-03-19 PROCEDURE — 700111 HCHG RX REV CODE 636 W/ 250 OVERRIDE (IP): Mod: JZ,UD

## 2024-03-19 PROCEDURE — 700111 HCHG RX REV CODE 636 W/ 250 OVERRIDE (IP): Mod: JZ,UD | Performed by: EMERGENCY MEDICINE

## 2024-03-19 PROCEDURE — 96365 THER/PROPH/DIAG IV INF INIT: CPT

## 2024-03-19 PROCEDURE — 71045 X-RAY EXAM CHEST 1 VIEW: CPT

## 2024-03-19 PROCEDURE — 700102 HCHG RX REV CODE 250 W/ 637 OVERRIDE(OP): Mod: UD | Performed by: EMERGENCY MEDICINE

## 2024-03-19 PROCEDURE — 700101 HCHG RX REV CODE 250: Performed by: EMERGENCY MEDICINE

## 2024-03-19 PROCEDURE — 700102 HCHG RX REV CODE 250 W/ 637 OVERRIDE(OP): Mod: UD

## 2024-03-19 PROCEDURE — 0241U HCHG SARS-COV-2 COVID-19 NFCT DS RESP RNA 4 TRGT ED POC: CPT

## 2024-03-19 PROCEDURE — 94644 CONT INHLJ TX 1ST HOUR: CPT

## 2024-03-19 PROCEDURE — A9270 NON-COVERED ITEM OR SERVICE: HCPCS | Mod: UD | Performed by: EMERGENCY MEDICINE

## 2024-03-19 PROCEDURE — 87040 BLOOD CULTURE FOR BACTERIA: CPT

## 2024-03-19 PROCEDURE — 94640 AIRWAY INHALATION TREATMENT: CPT

## 2024-03-19 PROCEDURE — 80053 COMPREHEN METABOLIC PANEL: CPT

## 2024-03-19 PROCEDURE — 99284 EMERGENCY DEPT VISIT MOD MDM: CPT

## 2024-03-19 PROCEDURE — 83605 ASSAY OF LACTIC ACID: CPT

## 2024-03-19 PROCEDURE — A9270 NON-COVERED ITEM OR SERVICE: HCPCS | Mod: UD

## 2024-03-19 RX ORDER — PREDNISONE 20 MG/1
20 TABLET ORAL DAILY
Qty: 5 TABLET | Refills: 0 | Status: SHIPPED | OUTPATIENT
Start: 2024-03-19 | End: 2024-03-24

## 2024-03-19 RX ORDER — ACETAMINOPHEN 500 MG
1000 TABLET ORAL ONCE
Status: COMPLETED | OUTPATIENT
Start: 2024-03-19 | End: 2024-03-19

## 2024-03-19 RX ORDER — ALBUTEROL SULFATE 90 UG/1
2 AEROSOL, METERED RESPIRATORY (INHALATION) EVERY 6 HOURS PRN
Qty: 8.5 G | Refills: 0 | Status: SHIPPED | OUTPATIENT
Start: 2024-03-19

## 2024-03-19 RX ORDER — METHYLPREDNISOLONE SODIUM SUCCINATE 40 MG/ML
40 INJECTION, POWDER, LYOPHILIZED, FOR SOLUTION INTRAMUSCULAR; INTRAVENOUS ONCE
Status: COMPLETED | OUTPATIENT
Start: 2024-03-19 | End: 2024-03-19

## 2024-03-19 RX ORDER — IPRATROPIUM BROMIDE AND ALBUTEROL SULFATE 2.5; .5 MG/3ML; MG/3ML
3 SOLUTION RESPIRATORY (INHALATION) ONCE
Status: COMPLETED | OUTPATIENT
Start: 2024-03-19 | End: 2024-03-19

## 2024-03-19 RX ORDER — BUDESONIDE AND FORMOTEROL FUMARATE DIHYDRATE 80; 4.5 UG/1; UG/1
2 AEROSOL RESPIRATORY (INHALATION) 2 TIMES DAILY
Qty: 4 EACH | Refills: 0 | Status: SHIPPED | OUTPATIENT
Start: 2024-03-19 | End: 2024-05-18

## 2024-03-19 RX ORDER — ALBUTEROL SULFATE 90 UG/1
2 AEROSOL, METERED RESPIRATORY (INHALATION) EVERY 6 HOURS PRN
Qty: 8.5 G | Refills: 0 | Status: SHIPPED | OUTPATIENT
Start: 2024-03-19 | End: 2024-03-19

## 2024-03-19 RX ORDER — PREDNISONE 20 MG/1
20 TABLET ORAL DAILY
Qty: 5 TABLET | Refills: 0 | Status: SHIPPED | OUTPATIENT
Start: 2024-03-19 | End: 2024-03-19

## 2024-03-19 RX ORDER — ALBUTEROL SULFATE 90 UG/1
2 AEROSOL, METERED RESPIRATORY (INHALATION) ONCE
Status: COMPLETED | OUTPATIENT
Start: 2024-03-19 | End: 2024-03-19

## 2024-03-19 RX ORDER — MAGNESIUM SULFATE HEPTAHYDRATE 40 MG/ML
2 INJECTION, SOLUTION INTRAVENOUS ONCE
Status: COMPLETED | OUTPATIENT
Start: 2024-03-19 | End: 2024-03-19

## 2024-03-19 RX ORDER — MAGNESIUM SULFATE HEPTAHYDRATE 40 MG/ML
2 INJECTION, SOLUTION INTRAVENOUS ONCE
Status: DISCONTINUED | OUTPATIENT
Start: 2024-03-19 | End: 2024-03-19

## 2024-03-19 RX ADMIN — METHYLPREDNISOLONE SODIUM SUCCINATE 40 MG: 40 INJECTION, POWDER, FOR SOLUTION INTRAMUSCULAR; INTRAVENOUS at 09:22

## 2024-03-19 RX ADMIN — ACETAMINOPHEN 1000 MG: 500 TABLET, FILM COATED ORAL at 10:19

## 2024-03-19 RX ADMIN — ALBUTEROL SULFATE 2 PUFF: 90 AEROSOL, METERED RESPIRATORY (INHALATION) at 12:26

## 2024-03-19 RX ADMIN — MAGNESIUM SULFATE HEPTAHYDRATE 2 G: 2 INJECTION, SOLUTION INTRAVENOUS at 09:19

## 2024-03-19 RX ADMIN — Medication 10 MG/HR: at 09:25

## 2024-03-19 RX ADMIN — IPRATROPIUM BROMIDE AND ALBUTEROL SULFATE 3 ML: .5; 3 SOLUTION RESPIRATORY (INHALATION) at 11:02

## 2024-03-19 ASSESSMENT — FIBROSIS 4 INDEX: FIB4 SCORE: 0.28

## 2024-03-19 ASSESSMENT — PAIN DESCRIPTION - PAIN TYPE: TYPE: ACUTE PAIN

## 2024-03-19 NOTE — ED TRIAGE NOTES
Pt comes in complaining of SOB. Pt reporting last week the same thing happened and she went to [facility] and felt better after a breathing treatment. Pt stating she felt better but then symptoms started again worsening this morning. Pt arrives here hypoxic requiring 3L NC

## 2024-03-19 NOTE — ED PROVIDER NOTES
ED Provider Note    CHIEF COMPLAINT  Chief Complaint   Patient presents with    Shortness of Breath       EXTERNAL RECORDS REVIEWED  Outpatient Notes History of meth use and moderate asthma per chart.     HPI/ROS  LIMITATION TO HISTORY   Select: : None  OUTSIDE HISTORIAN(S):  none    Alecia Keller is a 23 y.o. female who presents with shortness of breath, wheezing, productive cough, and congestion for about a week. Her Sob has progressed and now present at rest, rescue inhalers are no longer working. She has history of asthma, no recent hospitalizations with crisis but very symptomatic with exercise and cold exposure. She went to [facility] and felt better after a breathing treatment. Denies fever, chills, sore throat, or diarrhea. No sick contacts. She has been sober for 2 years from opioids and meth use. She is on suboxone.      PAST MEDICAL HISTORY   has a past medical history of Allergy, Anxiety, ASTHMA (11/3/2011), Asthma, Pleurisy (2008), and Pneumonia (2008).    SURGICAL HISTORY  patient denies any surgical history    FAMILY HISTORY  Family History   Problem Relation Age of Onset    Other Mother         liver disease    Diabetes Father     Hypertension Father     Cancer Maternal Grandfather         mult myeloma    Heart Disease Neg Hx     Hyperlipidemia Neg Hx     Stroke Neg Hx        SOCIAL HISTORY  Social History     Tobacco Use    Smoking status: Every Day     Current packs/day: 1.00     Average packs/day: 1 pack/day for 0.5 years (0.5 ttl pk-yrs)     Types: Cigarettes    Smokeless tobacco: Never   Vaping Use    Vaping Use: Never used   Substance and Sexual Activity    Alcohol use: Yes    Drug use: Yes     Types: Marijuana, Methamphetamines, Cocaine, Intravenous, Inhaled     Comment: cocaine, weed, heroin, meth, fentanyl    Sexual activity: Yes     Partners: Male     Birth control/protection: Pill, Condom       CURRENT MEDICATIONS  Home Medications    **Home medications have not yet been reviewed for  "this encounter**         ALLERGIES  Allergies   Allergen Reactions    Pcn [Penicillins] Hives    Pcn [Penicillins]     Pineapple        PHYSICAL EXAM  VITAL SIGNS: /61   Pulse (!) 101   Temp 36.4 °C (97.6 °F) (Temporal)   Resp 18   Ht 1.626 m (5' 4\")   Wt 63.5 kg (140 lb)   SpO2 88%   BMI 24.03 kg/m²      General: Awake, alert and oriented.  Head and Neck: NC/AT, EOMI, no scleral icterus or conjunctival pallor, no nasal discharge or oral erythema or exudates. Neck supple, no LADs.   CV: Rhythmic heart sounds, not well heard due to loud wheezing.    Pulm: Accessory muscle use, tachypneic, inspiratory and expiratory wheezing throughout.  GI: Flat, non distended, soft, non tender, normal bowel sounds.  Skin: Warm, no rashes, no lesions.  MSK: Normal ROM. No lower extremity edema. No lesions.   Neuro: Patient is alert and oriented x3. Speech is clear and fluent, goal directed. Is able to name, repeat and comprehend. Pupils equal, round and reactive to light. Good eye contact, extra ocular movements intact. Facial and body symmetry without obvious deficits.   Psych: Appropriate mood and affect.     DIAGNOSTIC STUDIES / PROCEDURES  EKG  N/A    LABS  CBC significant for eosinophilia, potassium 3.5. Normal lactic acid. Negative viral panel.     RADIOLOGY  I have independently interpreted the diagnostic imaging associated with this visit and am waiting the final reading from the radiologist.   My preliminary interpretation is as follows: Hyperinflated lung fields, no consolidations.   Radiologist interpretation: No acute cardiopulmonary abnormality.     COURSE & MEDICAL DECISION MAKING    ED Observation Status? No; Patient does not meet criteria for ED Observation.     INITIAL ASSESSMENT, COURSE AND PLAN  Care Narrative: This is a 23-year-old female with past medical history of polysubstance abuse-sober for 2 years on Suboxone and moderate intermittent asthma, who presents with productive cough, wheezing, " shortness of breath.  Upon admission she was hypoxic at 82% on room air, throughout inspiratory and expiratory wheezing noted.    Initiated nebulized albuterol, IV mag, Solu-Medrol. Patient triggered sepsis protocol. Labs remarkable for eosinophilia. CXR with hyperinflation, no consolidations.     Patient's bronchospasm slowly improved, remains with mild expiratory wheezing. Able to wean off O2, sats at 90% on room air. Therefore, patient is stable for discharge. Given burden of chronic symptoms and recent attacks, patient benefits of starting long acting therapy. Start Symbicort. She is to continue prednisone x 5 days and follow with PCP.       DISPOSITION AND DISCUSSIONS    Escalation of care considered: No     Barriers to care at this time: None    Stable for discharge. Specific instructions and symptoms to return were clearly explained. Advised to stop vaping.     FINAL DIAGNOSIS  1. Intermittent asthma with acute exacerbation, unspecified asthma severity         Electronically signed by: Tiffanie Guerrero M.D., 3/19/2024 8:58 AM    I independently evaluated the patient and repeated the important components of the history and physical. I discussed the management with the resident. I have reviewed and agree with the pertinent clinical information above including history, exam, study findings, and recommendations.  Patient improved significantly throughout her time here.  Oxygen retained to normal, exam and history consistent with asthma exacerbation likely secondary to medication and availability.  We have refilled patient's medications.  Patient to [facility].  I discussed return precautions.  Patient is comfortable returning home.    Electronically signed by: Donny Szymanski M.D., 3/19/2024 1:12 PM

## 2024-03-19 NOTE — ED NOTES
Patient to room from [facility] via wheel chair. Patient on 3L O2. Changed into gown, connected to monitor. Agree with triage note. Charted for ERP. Call light within reach.

## 2024-03-19 NOTE — ED NOTES
Pt discharged home as ordered by erp. Pt encouraged to follow up with her PCP and return here as needed. Pt instructed on where to  RX. Pt called a friend for a ride home and left ambulating independently

## 2024-03-19 NOTE — DISCHARGE INSTRUCTIONS
You were here with an asthma exacerbation. We are starting you on a long acting (maintenance) inhaler that you will use despite not having symptoms as we discussed, use it as prescribed. Continue taking prednisone for 5 days for inflammation of your lungs. Avoid inhaled drugs/vaping, that can make your asthma worse.

## 2024-03-19 NOTE — ED NOTES
Rounded on patient. Patient resting in bed, receiving breathing treatment. Denies further needs at this time. Call light within reach.

## 2024-03-24 LAB
BACTERIA BLD CULT: NORMAL
BACTERIA BLD CULT: NORMAL
SIGNIFICANT IND 70042: NORMAL
SIGNIFICANT IND 70042: NORMAL
SITE SITE: NORMAL
SITE SITE: NORMAL
SOURCE SOURCE: NORMAL
SOURCE SOURCE: NORMAL

## 2024-11-17 ENCOUNTER — PHARMACY VISIT (OUTPATIENT)
Dept: PHARMACY | Facility: MEDICAL CENTER | Age: 24
End: 2024-11-17
Payer: MEDICARE

## 2024-11-17 PROCEDURE — RXMED WILLOW AMBULATORY MEDICATION CHARGE: Performed by: STUDENT IN AN ORGANIZED HEALTH CARE EDUCATION/TRAINING PROGRAM

## 2024-11-17 RX ORDER — ALBUTEROL SULFATE 0.63 MG/3ML
SOLUTION RESPIRATORY (INHALATION)
Qty: 90 ML | Refills: 0 | OUTPATIENT
Start: 2024-11-17

## 2024-11-17 RX ORDER — PREDNISONE 50 MG/1
TABLET ORAL
Qty: 5 TABLET | Refills: 0 | OUTPATIENT
Start: 2024-11-17

## 2024-12-19 ENCOUNTER — OFFICE VISIT (OUTPATIENT)
Dept: URGENT CARE | Facility: CLINIC | Age: 24
End: 2024-12-19
Payer: COMMERCIAL

## 2024-12-19 ENCOUNTER — HOSPITAL ENCOUNTER (OUTPATIENT)
Facility: MEDICAL CENTER | Age: 24
End: 2024-12-19
Payer: COMMERCIAL

## 2024-12-19 VITALS
RESPIRATION RATE: 16 BRPM | DIASTOLIC BLOOD PRESSURE: 76 MMHG | HEART RATE: 100 BPM | HEIGHT: 65 IN | SYSTOLIC BLOOD PRESSURE: 122 MMHG | WEIGHT: 140 LBS | BODY MASS INDEX: 23.32 KG/M2 | OXYGEN SATURATION: 94 % | TEMPERATURE: 98.1 F

## 2024-12-19 DIAGNOSIS — R30.0 DYSURIA: ICD-10-CM

## 2024-12-19 DIAGNOSIS — N89.8 VAGINAL DISCHARGE: ICD-10-CM

## 2024-12-19 DIAGNOSIS — J45.901 ASTHMA WITH ACUTE EXACERBATION, UNSPECIFIED ASTHMA SEVERITY, UNSPECIFIED WHETHER PERSISTENT: ICD-10-CM

## 2024-12-19 LAB
APPEARANCE UR: NORMAL
BILIRUB UR STRIP-MCNC: NEGATIVE MG/DL
CANDIDA DNA VAG QL PROBE+SIG AMP: NEGATIVE
COLOR UR AUTO: YELLOW
G VAGINALIS DNA VAG QL PROBE+SIG AMP: POSITIVE
GLUCOSE UR STRIP.AUTO-MCNC: NEGATIVE MG/DL
KETONES UR STRIP.AUTO-MCNC: NEGATIVE MG/DL
LEUKOCYTE ESTERASE UR QL STRIP.AUTO: NEGATIVE
NITRITE UR QL STRIP.AUTO: NEGATIVE
PH UR STRIP.AUTO: 6 [PH] (ref 5–8)
POCT INT CON NEG: NEGATIVE
POCT INT CON POS: POSITIVE
POCT URINE PREGNANCY TEST: NEGATIVE
PROT UR QL STRIP: NEGATIVE MG/DL
RBC UR QL AUTO: NEGATIVE
SP GR UR STRIP.AUTO: >=1.03
T VAGINALIS DNA VAG QL PROBE+SIG AMP: NEGATIVE
UROBILINOGEN UR STRIP-MCNC: NORMAL MG/DL

## 2024-12-19 PROCEDURE — 87591 N.GONORRHOEAE DNA AMP PROB: CPT

## 2024-12-19 PROCEDURE — 87660 TRICHOMONAS VAGIN DIR PROBE: CPT

## 2024-12-19 PROCEDURE — 87480 CANDIDA DNA DIR PROBE: CPT

## 2024-12-19 PROCEDURE — 87491 CHLMYD TRACH DNA AMP PROBE: CPT

## 2024-12-19 PROCEDURE — 94640 AIRWAY INHALATION TREATMENT: CPT

## 2024-12-19 PROCEDURE — 3074F SYST BP LT 130 MM HG: CPT

## 2024-12-19 PROCEDURE — 87510 GARDNER VAG DNA DIR PROBE: CPT

## 2024-12-19 PROCEDURE — 81025 URINE PREGNANCY TEST: CPT

## 2024-12-19 PROCEDURE — 81002 URINALYSIS NONAUTO W/O SCOPE: CPT

## 2024-12-19 PROCEDURE — 3078F DIAST BP <80 MM HG: CPT

## 2024-12-19 PROCEDURE — 99214 OFFICE O/P EST MOD 30 MIN: CPT

## 2024-12-19 RX ORDER — PREDNISONE 20 MG/1
40 TABLET ORAL DAILY
Qty: 10 TABLET | Refills: 0 | Status: SHIPPED | OUTPATIENT
Start: 2024-12-19 | End: 2024-12-24

## 2024-12-19 RX ORDER — IPRATROPIUM BROMIDE AND ALBUTEROL SULFATE 2.5; .5 MG/3ML; MG/3ML
3 SOLUTION RESPIRATORY (INHALATION) ONCE
Status: COMPLETED | OUTPATIENT
Start: 2024-12-19 | End: 2024-12-19

## 2024-12-19 RX ORDER — DROSPIRENONE AND ETHINYL ESTRADIOL 0.02-3(28)
KIT ORAL
COMMUNITY
Start: 2024-11-19

## 2024-12-19 RX ADMIN — IPRATROPIUM BROMIDE AND ALBUTEROL SULFATE 3 ML: 2.5; .5 SOLUTION RESPIRATORY (INHALATION) at 15:00

## 2024-12-19 ASSESSMENT — ENCOUNTER SYMPTOMS
SPUTUM PRODUCTION: 1
FEVER: 0
WHEEZING: 1
COUGH: 1
SHORTNESS OF BREATH: 1

## 2024-12-19 ASSESSMENT — FIBROSIS 4 INDEX: FIB4 SCORE: 0.29

## 2024-12-19 NOTE — PROGRESS NOTES
Verbal consent was acquired by the patient to use [employer] ambient listening note generation during this visit   Subjective:   Alecia Keller is a 24 y.o. female who presents for UTI (X 3 days/ lower back pain/ frequency in urination /SOB/ pt states exposure to carbon dioxide )      HPI:  History of Present Illness  The patient is a 24-year-old female who presents for evaluation of wheezing, shortness of breath and suspected urinary tract infection.    She has been residing in her trailer, where she has experienced several episodes of dyspnea over the past 2 days, which she attributes to potential asthma exacerbation. She has a history of smoking and asthma, which is typically well-managed.  She reports that she recently had a sensor going off in her trailer that she is unsure of the cause.  She does not report any systemic symptoms such as fever, chills, or body aches.  She denies any headaches or any neurological complaints. She also reports a productive cough.  Despite using an albuterol inhaler, she has not found relief from her symptoms. She also uses a nebulizer but reports that excessive use results in tachycardia. She used her nebulizer yesterday and has used approximately 50 puffs of her albuterol inhaler since acquiring it yesterday.    She suspects a urinary tract infection or kidney infection, reporting dysuria, urinary frequency, and urgency for the past week. She also reports mild vaginal discharge and expresses concern about potential sexually transmitted diseases. She typically undergoes STD screening every 6 months but has not done so recently. She does not report any fevers but does report back pain.           Review of Systems   Constitutional:  Negative for fever.   Respiratory:  Positive for cough, sputum production, shortness of breath and wheezing.    Genitourinary:  Positive for dysuria and frequency.       Medications:    Current Outpatient Medications on File Prior to Visit  "  Medication Sig Dispense Refill    drospirenone-ethinyl estradiol (RUDY) 3-0.02 MG per tablet       albuterol (ACCUNEB) 0.63 MG/3ML nebulizer solution Inhale 3 mL by nebulizer  4 times daily 90 mL 0    albuterol 108 (90 Base) MCG/ACT Aero Soln inhalation aerosol Inhale 2 Puffs every 6 hours as needed for Shortness of Breath. 8.5 g 0    NS SOLN 60 mL with albuterol 2.5 mg/0.5 mL NEBU 5 mL 5 mg/hr by Nebulization route.      betamethasone dipropionate (DIPROLENE) 0.05 % Cream Apply 1 Application to affected area(s) 2 times a day. 1 Tube 0    azithromycin (ZITHROMAX) 250 MG Tab Take 2 tabs by mouth once today, then one tab by mouth once daily days 2-5.  Complete all medication. 6 Tab 0    NON SPECIFIED Indications: birth control daily (Patient not taking: Reported on 12/19/2024)       No current facility-administered medications on file prior to visit.        Allergies:   Pcn [penicillins], Pcn [penicillins], and Pineapple    Problem List:   Patient Active Problem List   Diagnosis    Asthma, mild intermittent    Drug abuse (HCC)    Acne vulgaris    Anxiety and depression        Surgical History:  No past surgical history on file.    Past Social Hx:   Social History     Tobacco Use    Smoking status: Every Day     Current packs/day: 1.00     Average packs/day: 1 pack/day for 0.5 years (0.5 ttl pk-yrs)     Types: Cigarettes    Smokeless tobacco: Never   Vaping Use    Vaping status: Former    Substances: Nicotine   Substance Use Topics    Alcohol use: Yes    Drug use: Yes     Types: Marijuana, Methamphetamines, Cocaine, Intravenous, Inhaled     Comment: cocaine, weed, heroin, meth, fentanyl          Problem list, medications, and allergies reviewed by myself today in Epic.     Objective:     /76   Pulse 100   Temp 36.7 °C (98.1 °F) (Temporal)   Resp 16   Ht 1.651 m (5' 5\")   Wt 63.5 kg (140 lb)   SpO2 94%   BMI 23.30 kg/m²     Physical Exam  Vitals and nursing note reviewed.   Constitutional:       General: " She is not in acute distress.     Appearance: Normal appearance. She is normal weight. She is not ill-appearing, toxic-appearing or diaphoretic.   HENT:      Head: Normocephalic and atraumatic.      Mouth/Throat:      Mouth: Mucous membranes are moist.      Pharynx: Oropharynx is clear. No oropharyngeal exudate or posterior oropharyngeal erythema.   Cardiovascular:      Rate and Rhythm: Normal rate and regular rhythm.      Pulses: Normal pulses.      Heart sounds: Normal heart sounds. No murmur heard.     No friction rub. No gallop.   Pulmonary:      Effort: No respiratory distress.      Breath sounds: No stridor. Wheezing present. No rhonchi or rales.      Comments: Increased work of breathing  Chest:      Chest wall: No tenderness.   Abdominal:      Tenderness: There is no right CVA tenderness or left CVA tenderness.   Musculoskeletal:      Cervical back: Neck supple. No tenderness.   Lymphadenopathy:      Cervical: No cervical adenopathy.   Skin:     General: Skin is warm and dry.      Capillary Refill: Capillary refill takes less than 2 seconds.   Neurological:      General: No focal deficit present.      Mental Status: She is alert and oriented to person, place, and time. Mental status is at baseline.      Cranial Nerves: No cranial nerve deficit.      Motor: No weakness.      Gait: Gait normal.   Psychiatric:         Mood and Affect: Mood normal.         Behavior: Behavior normal.         Thought Content: Thought content normal.         Judgment: Judgment normal.         Assessment/Plan:     Diagnosis and associated orders:   1. Asthma with acute exacerbation, unspecified asthma severity, unspecified whether persistent  - ipratropium-albuterol (DUONEB) nebulizer solution  - predniSONE (DELTASONE) 20 MG Tab; Take 2 Tablets by mouth every day for 5 days.  Dispense: 10 Tablet; Refill: 0    2. Dysuria  - POCT Urinalysis  - POCT PREGNANCY  - VAGINAL PATHOGENS DNA PANEL; Future  - Chlamydia/GC, PCR (Genital/Anal  swab); Future    3. Vaginal discharge  - POCT Urinalysis  - POCT PREGNANCY  - VAGINAL PATHOGENS DNA PANEL; Future  - Chlamydia/GC, PCR (Genital/Anal swab); Future    Other orders  - drospirenone-ethinyl estradiol (RUDY) 3-0.02 MG per tablet    Results for orders placed or performed in visit on 12/19/24   POCT Urinalysis    Collection Time: 12/19/24  2:45 PM   Result Value Ref Range    POC Color yellow Negative    POC Appearance cloudy Negative    POC Glucose negative Negative mg/dL    POC Bilirubin negative Negative mg/dL    POC Ketones negative Negative mg/dL    POC Specific Gravity >=1.030 <1.005 - >1.030    POC Blood negative Negative    POC Urine PH 6.0 5.0 - 8.0    POC Protein negative Negative mg/dL    POC Urobiligen 0.2 e.u. /dl Negative (0.2) mg/dL    POC Nitrites negative Negative    POC Leukocyte Esterase negative Negative   POCT PREGNANCY    Collection Time: 12/19/24  2:45 PM   Result Value Ref Range    POC Urine Pregnancy Test Negative     Internal Control Positive Positive     Internal Control Negative Negative        Comments/MDM:   Pt is clinically stable at today's acute urgent care visit.  No acute distress noted. Appropriate for outpatient management at this time.     Assessment & Plan    She reports worsening asthma symptoms over the past day or two, including significant wheezing and shortness of breath. She has been using her albuterol inhaler frequently, with limited relief, and experiences increased heart rate with nebulizer use. A course of steroids will be prescribed to manage the acute exacerbation.  There is concern regarding sensor that has been going off at her trailer.  It is unknown what this into sensor is indicating.  On my differentials, I have considered carbon monoxide.  The patient is without any neurological complaints such as headaches.  I have discussed going to the emergency department given her increased work of breathing despite administration of DuoNeb treatment in clinic.   The patient declines.  Strict ER precautions for any worsening in her conditions.  I did discuss red flags with the patient.    She reports dysuria, frequency, urgency, and back pain for the past week, suggestive of a UTI.  POC hCG is negative.  POC UA is unremarkable.  She also reports having vaginal discharge.  Vaginal swabs obtained today for vaginal pathogens panel along with GC chlamydia testing.  The patient is to refrain from any unprotected sexual intercourse until receiving negative results and/or treatment.    Patient is to return to UC or go to ER for any new or worsening signs or symptoms, and follow with with PCP for recheck. Patient is agreeable with plan of care and verbalizes good understanding.           Discussed DDx, management options (risks,benefits, and alternatives to planned treatment), natural progression and supportive care.  Expressed understanding and the treatment plan was agreed upon. Questions were encouraged and answered   Return to urgent care prn if new or worsening sx or if there is no improvement in condition prn.    Educated in Red flags and indications to immediately call 911 or present to the Emergency Department.   Advised the patient to follow-up with the primary care physician for recheck, reevaluation, and consideration of further management.    I personally reviewed prior external notes and test results pertinent to today's visit.  I have independently reviewed and interpreted all diagnostics ordered during this urgent care acute visit.       Please note that this dictation was created using voice recognition software. I have made a reasonable attempt to correct obvious errors, but I expect that there are errors of grammar and possibly content that I did not discover before finalizing the note.    This note was electronically signed by PEDRO Owens

## 2024-12-20 ENCOUNTER — APPOINTMENT (OUTPATIENT)
Dept: RADIOLOGY | Facility: MEDICAL CENTER | Age: 24
End: 2024-12-20
Attending: EMERGENCY MEDICINE
Payer: COMMERCIAL

## 2024-12-20 ENCOUNTER — HOSPITAL ENCOUNTER (EMERGENCY)
Facility: MEDICAL CENTER | Age: 24
End: 2024-12-21
Attending: EMERGENCY MEDICINE
Payer: COMMERCIAL

## 2024-12-20 DIAGNOSIS — B96.89 BV (BACTERIAL VAGINOSIS): ICD-10-CM

## 2024-12-20 DIAGNOSIS — R06.2 WHEEZING: ICD-10-CM

## 2024-12-20 DIAGNOSIS — N76.0 BV (BACTERIAL VAGINOSIS): ICD-10-CM

## 2024-12-20 LAB
ANION GAP SERPL CALC-SCNC: 10 MMOL/L (ref 7–16)
BASOPHILS # BLD AUTO: 0.7 % (ref 0–1.8)
BASOPHILS # BLD: 0.1 K/UL (ref 0–0.12)
BUN SERPL-MCNC: 17 MG/DL (ref 8–22)
C TRACH DNA GENITAL QL NAA+PROBE: NEGATIVE
CALCIUM SERPL-MCNC: 9.9 MG/DL (ref 8.5–10.5)
CHLORIDE SERPL-SCNC: 100 MMOL/L (ref 96–112)
CO2 SERPL-SCNC: 29 MMOL/L (ref 20–33)
COHGB MFR BLD: 2.3 % (ref 0–4.9)
CREAT SERPL-MCNC: 0.75 MG/DL (ref 0.5–1.4)
D DIMER PPP IA.FEU-MCNC: <0.27 UG/ML (FEU) (ref 0–0.5)
EOSINOPHIL # BLD AUTO: 0.91 K/UL (ref 0–0.51)
EOSINOPHIL NFR BLD: 6.6 % (ref 0–6.9)
ERYTHROCYTE [DISTWIDTH] IN BLOOD BY AUTOMATED COUNT: 45.9 FL (ref 35.9–50)
FLUAV RNA SPEC QL NAA+PROBE: NEGATIVE
FLUBV RNA SPEC QL NAA+PROBE: NEGATIVE
GFR SERPLBLD CREATININE-BSD FMLA CKD-EPI: 113 ML/MIN/1.73 M 2
GLUCOSE SERPL-MCNC: 100 MG/DL (ref 65–99)
HCG SERPL QL: NEGATIVE
HCT VFR BLD AUTO: 47.5 % (ref 37–47)
HGB BLD-MCNC: 15.5 G/DL (ref 12–16)
IMM GRANULOCYTES # BLD AUTO: 0.03 K/UL (ref 0–0.11)
IMM GRANULOCYTES NFR BLD AUTO: 0.2 % (ref 0–0.9)
LYMPHOCYTES # BLD AUTO: 3 K/UL (ref 1–4.8)
LYMPHOCYTES NFR BLD: 21.7 % (ref 22–41)
MCH RBC QN AUTO: 29 PG (ref 27–33)
MCHC RBC AUTO-ENTMCNC: 32.6 G/DL (ref 32.2–35.5)
MCV RBC AUTO: 88.8 FL (ref 81.4–97.8)
MONOCYTES # BLD AUTO: 0.92 K/UL (ref 0–0.85)
MONOCYTES NFR BLD AUTO: 6.6 % (ref 0–13.4)
N GONORRHOEA DNA GENITAL QL NAA+PROBE: NEGATIVE
NEUTROPHILS # BLD AUTO: 8.89 K/UL (ref 1.82–7.42)
NEUTROPHILS NFR BLD: 64.2 % (ref 44–72)
NRBC # BLD AUTO: 0 K/UL
NRBC BLD-RTO: 0 /100 WBC (ref 0–0.2)
PLATELET # BLD AUTO: 419 K/UL (ref 164–446)
PMV BLD AUTO: 9.7 FL (ref 9–12.9)
POTASSIUM SERPL-SCNC: 5.1 MMOL/L (ref 3.6–5.5)
RBC # BLD AUTO: 5.35 M/UL (ref 4.2–5.4)
RSV RNA SPEC QL NAA+PROBE: NEGATIVE
SARS-COV-2 RNA RESP QL NAA+PROBE: NOTDETECTED
SODIUM SERPL-SCNC: 139 MMOL/L (ref 135–145)
SPECIMEN SOURCE: NORMAL
WBC # BLD AUTO: 13.9 K/UL (ref 4.8–10.8)

## 2024-12-20 PROCEDURE — 700111 HCHG RX REV CODE 636 W/ 250 OVERRIDE (IP): Mod: JZ,UD | Performed by: EMERGENCY MEDICINE

## 2024-12-20 PROCEDURE — 80048 BASIC METABOLIC PNL TOTAL CA: CPT

## 2024-12-20 PROCEDURE — 700101 HCHG RX REV CODE 250: Mod: UD | Performed by: EMERGENCY MEDICINE

## 2024-12-20 PROCEDURE — 84703 CHORIONIC GONADOTROPIN ASSAY: CPT

## 2024-12-20 PROCEDURE — 85025 COMPLETE CBC W/AUTO DIFF WBC: CPT

## 2024-12-20 PROCEDURE — 71045 X-RAY EXAM CHEST 1 VIEW: CPT

## 2024-12-20 PROCEDURE — 93005 ELECTROCARDIOGRAM TRACING: CPT | Mod: TC | Performed by: EMERGENCY MEDICINE

## 2024-12-20 PROCEDURE — 96374 THER/PROPH/DIAG INJ IV PUSH: CPT

## 2024-12-20 PROCEDURE — 99284 EMERGENCY DEPT VISIT MOD MDM: CPT

## 2024-12-20 PROCEDURE — 93005 ELECTROCARDIOGRAM TRACING: CPT | Mod: TC

## 2024-12-20 PROCEDURE — 82375 ASSAY CARBOXYHB QUANT: CPT

## 2024-12-20 PROCEDURE — 0241U HCHG SARS-COV-2 COVID-19 NFCT DS RESP RNA 4 TRGT ED POC: CPT

## 2024-12-20 PROCEDURE — 85379 FIBRIN DEGRADATION QUANT: CPT

## 2024-12-20 PROCEDURE — 36415 COLL VENOUS BLD VENIPUNCTURE: CPT

## 2024-12-20 PROCEDURE — 700102 HCHG RX REV CODE 250 W/ 637 OVERRIDE(OP): Mod: UD | Performed by: EMERGENCY MEDICINE

## 2024-12-20 PROCEDURE — A9270 NON-COVERED ITEM OR SERVICE: HCPCS | Mod: UD | Performed by: EMERGENCY MEDICINE

## 2024-12-20 PROCEDURE — 94640 AIRWAY INHALATION TREATMENT: CPT

## 2024-12-20 RX ORDER — IPRATROPIUM BROMIDE AND ALBUTEROL SULFATE 2.5; .5 MG/3ML; MG/3ML
3 SOLUTION RESPIRATORY (INHALATION)
Status: DISCONTINUED | OUTPATIENT
Start: 2024-12-20 | End: 2024-12-21 | Stop reason: HOSPADM

## 2024-12-20 RX ORDER — ACETAMINOPHEN 325 MG/1
650 TABLET ORAL ONCE
Status: COMPLETED | OUTPATIENT
Start: 2024-12-20 | End: 2024-12-20

## 2024-12-20 RX ORDER — METHYLPREDNISOLONE 4 MG/1
TABLET ORAL
Qty: 21 EACH | Refills: 0 | Status: SHIPPED | OUTPATIENT
Start: 2024-12-20

## 2024-12-20 RX ORDER — METRONIDAZOLE 500 MG/1
500 TABLET ORAL 2 TIMES DAILY
Qty: 14 TABLET | Refills: 0 | Status: SHIPPED | OUTPATIENT
Start: 2024-12-20 | End: 2024-12-27

## 2024-12-20 RX ORDER — FLUTICASONE PROPIONATE AND SALMETEROL 100; 50 UG/1; UG/1
1 POWDER RESPIRATORY (INHALATION) EVERY 12 HOURS
Qty: 60 EACH | Refills: 0 | Status: SHIPPED | OUTPATIENT
Start: 2024-12-20

## 2024-12-20 RX ORDER — METHYLPREDNISOLONE SODIUM SUCCINATE 125 MG/2ML
62.5 INJECTION, POWDER, LYOPHILIZED, FOR SOLUTION INTRAMUSCULAR; INTRAVENOUS ONCE
Status: COMPLETED | OUTPATIENT
Start: 2024-12-20 | End: 2024-12-20

## 2024-12-20 RX ADMIN — METHYLPREDNISOLONE SODIUM SUCCINATE 62.5 MG: 125 INJECTION, POWDER, FOR SOLUTION INTRAMUSCULAR; INTRAVENOUS at 22:37

## 2024-12-20 RX ADMIN — IPRATROPIUM BROMIDE AND ALBUTEROL SULFATE 3 ML: .5; 2.5 SOLUTION RESPIRATORY (INHALATION) at 22:23

## 2024-12-20 RX ADMIN — ACETAMINOPHEN 650 MG: 325 TABLET ORAL at 22:36

## 2024-12-20 ASSESSMENT — FIBROSIS 4 INDEX: FIB4 SCORE: 0.29

## 2024-12-21 VITALS
BODY MASS INDEX: 23.32 KG/M2 | WEIGHT: 140 LBS | TEMPERATURE: 98.8 F | RESPIRATION RATE: 16 BRPM | SYSTOLIC BLOOD PRESSURE: 127 MMHG | DIASTOLIC BLOOD PRESSURE: 76 MMHG | HEART RATE: 98 BPM | HEIGHT: 65 IN | OXYGEN SATURATION: 91 %

## 2024-12-21 LAB — EKG IMPRESSION: NORMAL

## 2024-12-21 NOTE — ED NOTES
Patient to room in  and accompanied by family.  Labs drawn and sent in triage.    Chart up for ERP.

## 2024-12-21 NOTE — ED NOTES
PIV removed, catheter intact. Discharge education provided. Discharge paperwork reviewed with pt. Prescription to be picked up by pt. All questions answered. All belongings with pt. Pt assisted to the lobby in WC with boyfriend.

## 2024-12-21 NOTE — ED TRIAGE NOTES
"Chief Complaint   Patient presents with    Asthma    Headache    Shortness of Breath     24 y.o. female  to triage for above complaint. Pt stays in a trailer and reports an alarm was going off but unsure if it was for carbon monoxide or what. Pt reports she is having an asthma attack, hx of asthma. Pt took her rescue inhaler but no relief. Pt having trouble breathing, very sleepy. Pt reports she has a severe headache. Reports doing marijuana today and cocaine. GCS 15. Pt placed on 2L of oxygen NC.     Pt is alert and oriented, speaking in full sentences, follows commands and responds appropriately to questions     Pt placed in lobby. Pt educated on triage process. Pt encouraged to alert staff for any changes.     Patient and staff wearing appropriate PPE    BP (!) 150/87   Pulse (!) 118   Temp 37.1 °C (98.7 °F) (Temporal)   Resp 20   Ht 1.651 m (5' 5\")   Wt 63.5 kg (140 lb)   SpO2 94%   BMI 23.30 kg/m²    "

## 2024-12-21 NOTE — ED PROVIDER NOTES
ED Provider Note    CHIEF COMPLAINT  Chief Complaint   Patient presents with    Asthma    Headache    Shortness of Breath       EXTERNAL RECORDS REVIEWED  Inpatient Notes most recent ER visit 3/19/2024 for asthma exacerbation.    HPI/ROS  LIMITATION TO HISTORY   Select: : None  OUTSIDE HISTORIAN(S):  Significant other at bedside assisting with history\    Alecia Keller is a 24 y.o. female who presents to the Emergency Department complaining of shortness of breath, wheezing and headache.  Past medical history as document below.  Chronic persistent asthma.  Does have albuterol nebulizer at home.  States that she has to come to the ER roughly every other month for asthma care.  Currently living in a 50 foot travel trailer as primary residence.  They do run a generator.  Today the carbon monoxide detector had gone off and therefore they were concerned that this may be causing the patient's headache and dyspnea rather than just her asthma.  Denies any other recent illness.    PAST MEDICAL HISTORY   has a past medical history of Allergy, Anxiety, ASTHMA (11/3/2011), Asthma, Pleurisy (2008), and Pneumonia (2008).    SURGICAL HISTORY  patient denies any surgical history    FAMILY HISTORY  Family History   Problem Relation Age of Onset    Other Mother         liver disease    Diabetes Father     Hypertension Father     Cancer Maternal Grandfather         mult myeloma    Heart Disease Neg Hx     Hyperlipidemia Neg Hx     Stroke Neg Hx        SOCIAL HISTORY  Social History     Tobacco Use    Smoking status: Every Day     Current packs/day: 1.00     Average packs/day: 1 pack/day for 0.5 years (0.5 ttl pk-yrs)     Types: Cigarettes    Smokeless tobacco: Never   Vaping Use    Vaping status: Former    Substances: Nicotine   Substance and Sexual Activity    Alcohol use: Yes    Drug use: Yes     Types: Marijuana, Methamphetamines, Cocaine, Intravenous, Inhaled     Comment: cocaine, weed, heroin, meth, fentanyl    Sexual  "activity: Yes     Partners: Male     Birth control/protection: Pill, Condom       CURRENT MEDICATIONS  Home Medications       Reviewed by Audra Jsoeph R.N. (Registered Nurse) on 12/20/24 at 2028  Med List Status: Not Addressed     Medication Last Dose Status   albuterol (ACCUNEB) 0.63 MG/3ML nebulizer solution  Active   albuterol 108 (90 Base) MCG/ACT Aero Soln inhalation aerosol  Active   azithromycin (ZITHROMAX) 250 MG Tab  Active   betamethasone dipropionate (DIPROLENE) 0.05 % Cream  Active   drospirenone-ethinyl estradiol (RUDY) 3-0.02 MG per tablet  Active   metroNIDAZOLE (FLAGYL) 500 MG Tab  Active   NON SPECIFIED  Active   NS SOLN 60 mL with albuterol 2.5 mg/0.5 mL NEBU 5 mL  Active   predniSONE (DELTASONE) 20 MG Tab  Active                  Audit from Redirected Encounters    **Home medications have not yet been reviewed for this encounter**         ALLERGIES  Allergies   Allergen Reactions    Pcn [Penicillins] Hives    Pcn [Penicillins]     Pineapple        PHYSICAL EXAM  VITAL SIGNS: /77   Pulse 99   Temp 37.1 °C (98.7 °F) (Temporal)   Resp 19   Ht 1.651 m (5' 5\")   Wt 63.5 kg (140 lb)   SpO2 94%   BMI 23.30 kg/m²      Pulse ox interpretation: I interpret this pulse ox as normal.  Constitutional: Alert in no apparent distress.  HENT: No signs of trauma, Bilateral external ears normal, Nose normal.   Eyes: Pupils are equal and reactive  Neck: Normal range of motion, No tenderness, Supple  Cardiovascular: Regular rate and rhythm, no murmurs.   Thorax & Lungs: Normal breath sounds, + wheezing  Skin: Warm, Dry, No erythema, No rash.   Musculoskeletal: Good range of motion in all major joints  Neurologic: Alert , Normal motor function, Normal sensory function, No focal deficits noted.   Psychiatric: Affect normal, Judgment normal, Mood normal.         EKG/LABS  Results for orders placed or performed during the hospital encounter of 12/20/24   CARBOXYHEMOGLOBIN    Collection Time: 12/20/24  " 9:26 PM   Result Value Ref Range    Carbon Monoxide-Co 2.30 0.00 - 4.90 %   HCG Qual Serum    Collection Time: 12/20/24 10:15 PM   Result Value Ref Range    Beta-Hcg Qualitative Serum Negative Negative   CBC w/ Differential    Collection Time: 12/20/24 10:15 PM   Result Value Ref Range    WBC 13.9 (H) 4.8 - 10.8 K/uL    RBC 5.35 4.20 - 5.40 M/uL    Hemoglobin 15.5 12.0 - 16.0 g/dL    Hematocrit 47.5 (H) 37.0 - 47.0 %    MCV 88.8 81.4 - 97.8 fL    MCH 29.0 27.0 - 33.0 pg    MCHC 32.6 32.2 - 35.5 g/dL    RDW 45.9 35.9 - 50.0 fL    Platelet Count 419 164 - 446 K/uL    MPV 9.7 9.0 - 12.9 fL    Neutrophils-Polys 64.20 44.00 - 72.00 %    Lymphocytes 21.70 (L) 22.00 - 41.00 %    Monocytes 6.60 0.00 - 13.40 %    Eosinophils 6.60 0.00 - 6.90 %    Basophils 0.70 0.00 - 1.80 %    Immature Granulocytes 0.20 0.00 - 0.90 %    Nucleated RBC 0.00 0.00 - 0.20 /100 WBC    Neutrophils (Absolute) 8.89 (H) 1.82 - 7.42 K/uL    Lymphs (Absolute) 3.00 1.00 - 4.80 K/uL    Monos (Absolute) 0.92 (H) 0.00 - 0.85 K/uL    Eos (Absolute) 0.91 (H) 0.00 - 0.51 K/uL    Baso (Absolute) 0.10 0.00 - 0.12 K/uL    Immature Granulocytes (abs) 0.03 0.00 - 0.11 K/uL    NRBC (Absolute) 0.00 K/uL   Basic Metabolic Panel (BMP)    Collection Time: 12/20/24 10:15 PM   Result Value Ref Range    Sodium 139 135 - 145 mmol/L    Potassium 5.1 3.6 - 5.5 mmol/L    Chloride 100 96 - 112 mmol/L    Co2 29 20 - 33 mmol/L    Glucose 100 (H) 65 - 99 mg/dL    Bun 17 8 - 22 mg/dL    Creatinine 0.75 0.50 - 1.40 mg/dL    Calcium 9.9 8.5 - 10.5 mg/dL    Anion Gap 10.0 7.0 - 16.0   D-Dimer (only helpful in low pre-test probability wells critieria. Do not order if patient ruled out by PERC criteria. See Weblinks at top of Labs section)    Collection Time: 12/20/24 10:15 PM   Result Value Ref Range    D-Dimer <0.27 0.00 - 0.50 ug/mL (FEU)   ESTIMATED GFR    Collection Time: 12/20/24 10:15 PM   Result Value Ref Range    GFR (CKD-EPI) 113 >60 mL/min/1.73 m 2   POC CoV-2, FLU A/B, RSV  by PCR    Collection Time: 24 10:48 PM   Result Value Ref Range    POC Influenza A RNA, PCR Negative Negative    POC Influenza B RNA, PCR Negative Negative    POC RSV, by PCR Negative Negative    POC SARS-CoV-2, PCR NotDetected NotDetected   EKG    Collection Time: 24 12:43 AM   Result Value Ref Range    Report       [facility] Emergency Dept.    Test Date:  2024  Pt Name:    WILLIAM MÉNDEZ              Department: ER  MRN:        [id number]                      Room:  Gender:     Female                       Technician: [id number]  :        2000                   Requested By:ER TRIAGE PROTOCOL  Order #:    [id number]                    Reading MD: Francois Perdue    Measurements  Intervals                                Axis  Rate:       96                           P:          79  MT:         145                          QRS:        72  QRSD:       94                           T:          73  QT:         348  QTc:        440    Interpretive Statements  Sinus rhythm  Low voltage, extremity and precordial leads  Compared to ECG 2018 22:38:59  Low QRS voltage now present  Right ventricular hypertrophy no longer present  Electronically Signed On 2024 00:43:42 PST by Francois Perdue         I have independently interpreted this EKG    RADIOLOGY/PROCEDURES   I have independently interpreted the diagnostic imaging associated with this visit and am waiting the final reading from the radiologist.   My preliminary interpretation is as follows: No consolidative process    Radiologist interpretation:  DX-CHEST-PORTABLE (1 VIEW)   Final Result         1.  No acute cardiopulmonary disease.          COURSE & MEDICAL DECISION MAKING    ASSESSMENT, COURSE AND PLAN  Care Narrative: 24-year-old female presenting to the emergency department with recurrent wheezing.  Will broaden differential beyond her known asthma.  Will complete labs, viral panel and chest x-ray    DISPOSITION AND  DISCUSSIONS  I have discussed management of the patient with the following physicians and JW's: None    Discussion of management with other QHP or appropriate source(s): None     Escalation of care considered, and ultimately not performed:acute inpatient care management, however at this time, the patient is most appropriate for outpatient management    Barriers to care at this time, including but not limited to: Patient does not have established PCP.     Decision tools and prescription drugs considered including, but not limited to:  Will give the patient steroids and Salmeterol .    24-year-old female presenting to the ER with above presentation.  Workup above is reassuring to include a negative carbon monoxide.  Slightly elevated but likely secondary to her smoking baseline.  D-dimer negative.  Overall low risk from a PE standpoint.  Chest x-ray without acute consolidative process.  Will continue ongoing asthma exacerbation care to include oral steroid taper and additional inhaler to include salmeterol    Francois Perdue M.D. spent greater than 3 minutes with the patient explaining the importance of smoking cessation.       FINAL DIAGNOSIS  1. Wheezing         Electronically signed by: Francois Perdue M.D., 12/20/2024 10:03 PM

## 2024-12-21 NOTE — ED NOTES
Break RN: this RN asked how the pt is feeling and updated the pt that she will be discharged, pt is still drowsy and not ready for DC papers yet

## 2025-01-29 ENCOUNTER — APPOINTMENT (OUTPATIENT)
Dept: RADIOLOGY | Facility: MEDICAL CENTER | Age: 25
End: 2025-01-29
Attending: EMERGENCY MEDICINE
Payer: COMMERCIAL

## 2025-01-29 ENCOUNTER — HOSPITAL ENCOUNTER (EMERGENCY)
Facility: MEDICAL CENTER | Age: 25
End: 2025-01-29
Attending: EMERGENCY MEDICINE
Payer: COMMERCIAL

## 2025-01-29 VITALS
HEART RATE: 81 BPM | HEIGHT: 66 IN | BODY MASS INDEX: 18.48 KG/M2 | WEIGHT: 115 LBS | OXYGEN SATURATION: 91 % | RESPIRATION RATE: 16 BRPM | DIASTOLIC BLOOD PRESSURE: 56 MMHG | TEMPERATURE: 96.6 F | SYSTOLIC BLOOD PRESSURE: 101 MMHG

## 2025-01-29 DIAGNOSIS — G44.82 COITAL HEADACHE: ICD-10-CM

## 2025-01-29 LAB — HCG SERPL QL: NEGATIVE

## 2025-01-29 PROCEDURE — 700111 HCHG RX REV CODE 636 W/ 250 OVERRIDE (IP): Mod: JZ,UD | Performed by: EMERGENCY MEDICINE

## 2025-01-29 PROCEDURE — 99284 EMERGENCY DEPT VISIT MOD MDM: CPT

## 2025-01-29 PROCEDURE — 36415 COLL VENOUS BLD VENIPUNCTURE: CPT

## 2025-01-29 PROCEDURE — 70450 CT HEAD/BRAIN W/O DYE: CPT

## 2025-01-29 PROCEDURE — 84703 CHORIONIC GONADOTROPIN ASSAY: CPT

## 2025-01-29 PROCEDURE — 96375 TX/PRO/DX INJ NEW DRUG ADDON: CPT

## 2025-01-29 PROCEDURE — 700105 HCHG RX REV CODE 258: Mod: UD | Performed by: EMERGENCY MEDICINE

## 2025-01-29 PROCEDURE — 96374 THER/PROPH/DIAG INJ IV PUSH: CPT

## 2025-01-29 RX ORDER — KETOROLAC TROMETHAMINE 15 MG/ML
15 INJECTION, SOLUTION INTRAMUSCULAR; INTRAVENOUS ONCE
Status: COMPLETED | OUTPATIENT
Start: 2025-01-29 | End: 2025-01-29

## 2025-01-29 RX ORDER — SODIUM CHLORIDE 9 MG/ML
1000 INJECTION, SOLUTION INTRAVENOUS ONCE
Status: COMPLETED | OUTPATIENT
Start: 2025-01-29 | End: 2025-01-29

## 2025-01-29 RX ORDER — METOCLOPRAMIDE HYDROCHLORIDE 5 MG/ML
10 INJECTION INTRAMUSCULAR; INTRAVENOUS ONCE
Status: COMPLETED | OUTPATIENT
Start: 2025-01-29 | End: 2025-01-29

## 2025-01-29 RX ORDER — DIPHENHYDRAMINE HYDROCHLORIDE 50 MG/ML
25 INJECTION INTRAMUSCULAR; INTRAVENOUS ONCE
Status: COMPLETED | OUTPATIENT
Start: 2025-01-29 | End: 2025-01-29

## 2025-01-29 RX ADMIN — METOCLOPRAMIDE 10 MG: 5 INJECTION, SOLUTION INTRAMUSCULAR; INTRAVENOUS at 06:31

## 2025-01-29 RX ADMIN — SODIUM CHLORIDE 1000 ML: 9 INJECTION, SOLUTION INTRAVENOUS at 06:31

## 2025-01-29 RX ADMIN — DIPHENHYDRAMINE HYDROCHLORIDE 25 MG: 50 INJECTION, SOLUTION INTRAMUSCULAR; INTRAVENOUS at 06:31

## 2025-01-29 RX ADMIN — KETOROLAC TROMETHAMINE 15 MG: 15 INJECTION, SOLUTION INTRAMUSCULAR; INTRAVENOUS at 08:04

## 2025-01-29 ASSESSMENT — PAIN DESCRIPTION - PAIN TYPE
TYPE: ACUTE PAIN
TYPE: ACUTE PAIN

## 2025-01-29 ASSESSMENT — FIBROSIS 4 INDEX: FIB4 SCORE: 0.23

## 2025-01-29 NOTE — ED NOTES
Pt remains in gurney with stable vitals on RA.   Pt position in bed adjusted for comfort. Bolus complete. Safety reviewed, call light within reach. Pt denies additional needs at this time.

## 2025-01-29 NOTE — ED NOTES
PT's family member called, asked to return for pt's ride home.   No answer, message left.   Pt asked to contact family member via their phone as well.   Pt states headache much improved after falling asleep for a short time.    normal...

## 2025-01-29 NOTE — ED NOTES
Reviewed discharge instructions, patient verbalized understanding. States they will schedule follow up appointment if needed. Pt agreeable to  prescriptions from pharmacy and verbalized understanding on how to take medications. Verbalized understanding to not drive or operate heavy machinery while taking narcotics.    Denies further questions at this time. Pt ambulatory out of ER with steady gait where she was picked up and taken home by a friend

## 2025-01-29 NOTE — ED PROVIDER NOTES
"  ER Provider Note    Scribed for Kel Cross M.D. by Lucia Mcnair. 1/29/2025   6:13 AM    Primary Care Provider: Pcp Pt States None    CHIEF COMPLAINT  Chief Complaint   Patient presents with    Headache     Pt reports HA, pressure, blurry vision, and facial numbness after having sex. Pt reports she can \"feel the veins in her brain.\"      EXTERNAL RECORDS REVIEWED  Inpatient Notes Patient was admitted 1/13/25 in [place] for asthma exacerbation.    HPI/ROS  LIMITATION TO HISTORY   Select: : None  OUTSIDE HISTORIAN(S):  None    Alecia Keller is a 24 y.o. female who presents to the ED for evaluation of an acute headache onset last night.  The patient reports that her headache began after having intercourse, adding that her vision went blurry. She also reports that she could not sit still or stand afterwards and had felt emotional before going to bed. The patient notes that her headache feels like pressure, noting that she can \"feel the veins in her head.\" There were no alleviating factors reported. The patient denies having a headache like this before. The patient states that she has a bad tooth and thinks that her headache may be related to this. The patient has an allergy to Penicillins and pineapple.     PAST MEDICAL HISTORY  Past Medical History:   Diagnosis Date    Allergy     Anxiety     ASTHMA 11/3/2011    Asthma     Pleurisy 2008    Pneumonia 2008     SURGICAL HISTORY  History reviewed. No pertinent surgical history.    FAMILY HISTORY  Family History   Problem Relation Age of Onset    Other Mother         liver disease    Diabetes Father     Hypertension Father     Cancer Maternal Grandfather         mult myeloma    Heart Disease Neg Hx     Hyperlipidemia Neg Hx     Stroke Neg Hx      SOCIAL HISTORY   reports that she has been smoking cigarettes. She has a 0.5 pack-year smoking history. She has never used smokeless tobacco. She reports current alcohol use. She reports current drug use. " "Drugs: Marijuana, Methamphetamines, Cocaine, Intravenous, and Inhaled.    CURRENT MEDICATIONS  Previous Medications    ALBUTEROL (ACCUNEB) 0.63 MG/3ML NEBULIZER SOLUTION    Inhale 3 mL by nebulizer  4 times daily    ALBUTEROL 108 (90 BASE) MCG/ACT AERO SOLN INHALATION AEROSOL    Inhale 2 Puffs every 6 hours as needed for Shortness of Breath.    AZITHROMYCIN (ZITHROMAX) 250 MG TAB    Take 2 tabs by mouth once today, then one tab by mouth once daily days 2-5.  Complete all medication.    BETAMETHASONE DIPROPIONATE (DIPROLENE) 0.05 % CREAM    Apply 1 Application to affected area(s) 2 times a day.    DROSPIRENONE-ETHINYL ESTRADIOL (RUDY) 3-0.02 MG PER TABLET        FLUTICASONE-SALMETEROL (ADVAIR) 100-50 MCG/ACT AEROSOL POWDER, BREATH ACTIVATED    Inhale 1 Puff every 12 hours.    METHYLPREDNISOLONE (MEDROL DOSEPAK) 4 MG TABLET THERAPY PACK    Use as directed on packaging    NON SPECIFIED    Indications: birth control daily    NS SOLN 60 ML WITH ALBUTEROL 2.5 MG/0.5 ML NEBU 5 ML    5 mg/hr by Nebulization route.     ALLERGIES  Allergies   Allergen Reactions    Pcn [Penicillins] Hives    Pcn [Penicillins]     Pineapple         PHYSICAL EXAM  BP (!) 140/110   Pulse (!) 117   Temp 35.9 °C (96.6 °F) (Temporal)   Resp (!) 24   Ht 1.676 m (5' 6\")   Wt 52.2 kg (115 lb)   SpO2 96%   BMI 18.56 kg/m²      Nursing note and vitals reviewed.  Constitutional: Well-developed and well-nourished. Moderate distress.   HENT: Head is normocephalic and atraumatic. Oropharynx is clear and moist without exudate or erythema.   Eyes: Pupils are equal, round, and reactive to light. Conjunctiva are normal.   Cardiovascular: Tachycardic, Regular rhythm. No murmur heard. Normal radial pulses.  Pulmonary/Chest: Breath sounds normal. No wheezes or rales.   Abdominal: Soft and non-tender. No distention    Musculoskeletal: Extremities exhibit normal range of motion without edema or tenderness.   Neurological: Alert & oriented x 3, Normal cognition, " Cranial nerves II-XII are intact, normal speech and language, strength to bilateral upper and lower extremities is normal, sensation is intact throughout.   Skin: Skin is warm and dry. No rash.   Psychiatric: Normal mood and affect. Appropriate for clinical situation    DIAGNOSTIC STUDIES    Labs:   Results for orders placed or performed during the hospital encounter of 01/29/25   BETA-HCG QUALITATIVE SERUM    Collection Time: 01/29/25  6:22 AM   Result Value Ref Range    Beta-Hcg Qualitative Serum Negative Negative     Radiology:   This attending emergency physician has independently interpreted the diagnostic imaging associated with this visit and is awaiting the final reading from the radiologist.   Preliminary interpretation is a follows: Head CT that showed no intracranial hemorrhage.     Radiologist interpretation:   CT-HEAD W/O   Final Result      No acute process.                    INITIAL ASSESSMENT AND PLAN    6:13 AM - Patient was evaluated at bedside. Ordered for CT-Head w/o, Beta-HCG qual to evaluate. The patient will be medicated with Benadryl injection 25 mg, Reglan injection 10 mg, and Toradol injection 15 mg for her symptoms. Patient verbalizes understanding and support with my plan of care.  Differential diagnoses include but not limited to: Intracranial hemorrhage, postcoital headache, migraine.      ED Observation Status? No; Patient does not meet criteria for ED Observation.      COURSE AND MEDICAL DECISION MAKING    8:39 AM - I independently reviewed the patient's Head CT that showed no intracranial hemorrhage. Patient was reevaluated at bedside. Patient is resting comfortably at this time.  Symptoms resolved after treatment.  Consistent with postcoital headache.    8:55 AM - I reviewed the patient's CT scan that was negative. Plan for discharge and follow up was discussed, as outlined below. The patient is stable for discharge at this time and will return for any new or worsening symptoms.  Patient verbalizes understanding and support with my plan for discharge.      HYDRATION: Based on the patient's presentation of Other symptomatic treatment the patient was given IV fluids. IV Hydration was used because oral hydration was not adequate alone. Upon recheck following hydration, the patient was improved.    DISPOSITION AND DISCUSSIONS    I have discussed management of the patient with the following physicians and JW's:  None    Discussion of management with other QHP or appropriate source(s): None     Barriers to care at this time, including but not limited to: Patient does not have established PCP.     The patient will return for new or worsening symptoms and is stable at the time of discharge.    The patient is referred to a primary physician for blood pressure management, diabetic screening, and for all other preventative health concerns.    DISPOSITION:  Patient will be discharged home in stable condition.    FOLLOW UP:  [facility], Emergency Dept  [address] [place] [id number] [phone]    If symptoms worsen      FINAL DIAGNOSIS  1. Coital headache         ILucia (Severino), am scribing for, and in the presence of, Kel Cross M.D..    Electronically signed by: Lucia Mcnair (Severino), 1/29/2025    IKel M.D. personally performed the services described in this documentation, as scribed by Lucia Mcnair in my presence, and it is both accurate and complete.      The note accurately reflects work and decisions made by me.  Kel Cross M.D.  1/29/2025  1:13 PM

## 2025-01-29 NOTE — ED NOTES
Report received from Michelle TIMMONS. Pt out to CT now.   Waiting for pharmacy to approve Toradol once pregnancy test back, then this RN to administer once pt back from scan.

## 2025-01-29 NOTE — ED TRIAGE NOTES
"Chief Complaint   Patient presents with    Headache     Pt reports HA, pressure, blurry vision, and facial numbness after having sex. Pt reports she can \"feel the veins in her brain.\"      BP (!) 140/110   Pulse (!) 117   Temp 35.9 °C (96.6 °F) (Temporal)   Resp (!) 24   Ht 1.676 m (5' 6\")   Wt 52.2 kg (115 lb)   SpO2 96%   BMI 18.56 kg/m²       "

## 2025-03-11 ENCOUNTER — HOSPITAL ENCOUNTER (EMERGENCY)
Facility: MEDICAL CENTER | Age: 25
End: 2025-03-11
Attending: STUDENT IN AN ORGANIZED HEALTH CARE EDUCATION/TRAINING PROGRAM
Payer: COMMERCIAL

## 2025-03-11 ENCOUNTER — PHARMACY VISIT (OUTPATIENT)
Dept: PHARMACY | Facility: MEDICAL CENTER | Age: 25
End: 2025-03-11
Payer: MEDICARE

## 2025-03-11 VITALS
OXYGEN SATURATION: 100 % | BODY MASS INDEX: 19.13 KG/M2 | DIASTOLIC BLOOD PRESSURE: 58 MMHG | TEMPERATURE: 98.7 F | RESPIRATION RATE: 16 BRPM | HEART RATE: 99 BPM | WEIGHT: 119.05 LBS | HEIGHT: 66 IN | SYSTOLIC BLOOD PRESSURE: 119 MMHG

## 2025-03-11 DIAGNOSIS — S02.5XXA CLOSED FRACTURE OF TOOTH, INITIAL ENCOUNTER: ICD-10-CM

## 2025-03-11 DIAGNOSIS — K04.7 DENTAL INFECTION: ICD-10-CM

## 2025-03-11 PROCEDURE — 64400 NJX AA&/STRD TRIGEMINAL NRV: CPT

## 2025-03-11 PROCEDURE — 99283 EMERGENCY DEPT VISIT LOW MDM: CPT

## 2025-03-11 PROCEDURE — 700101 HCHG RX REV CODE 250: Mod: UD | Performed by: STUDENT IN AN ORGANIZED HEALTH CARE EDUCATION/TRAINING PROGRAM

## 2025-03-11 PROCEDURE — A9270 NON-COVERED ITEM OR SERVICE: HCPCS | Mod: UD | Performed by: STUDENT IN AN ORGANIZED HEALTH CARE EDUCATION/TRAINING PROGRAM

## 2025-03-11 PROCEDURE — 700102 HCHG RX REV CODE 250 W/ 637 OVERRIDE(OP): Mod: UD | Performed by: STUDENT IN AN ORGANIZED HEALTH CARE EDUCATION/TRAINING PROGRAM

## 2025-03-11 PROCEDURE — RXMED WILLOW AMBULATORY MEDICATION CHARGE: Performed by: STUDENT IN AN ORGANIZED HEALTH CARE EDUCATION/TRAINING PROGRAM

## 2025-03-11 RX ORDER — CLINDAMYCIN HYDROCHLORIDE 150 MG/1
300 CAPSULE ORAL ONCE
Status: COMPLETED | OUTPATIENT
Start: 2025-03-11 | End: 2025-03-11

## 2025-03-11 RX ORDER — BUPIVACAINE HYDROCHLORIDE AND EPINEPHRINE 5; 5 MG/ML; UG/ML
10 INJECTION, SOLUTION EPIDURAL; INTRACAUDAL; PERINEURAL ONCE
Status: COMPLETED | OUTPATIENT
Start: 2025-03-11 | End: 2025-03-11

## 2025-03-11 RX ORDER — ACETAMINOPHEN 325 MG/1
1000 TABLET ORAL EVERY 6 HOURS PRN
Qty: 30 TABLET | Refills: 0 | Status: SHIPPED | OUTPATIENT
Start: 2025-03-11

## 2025-03-11 RX ORDER — IBUPROFEN 400 MG/1
400 TABLET, FILM COATED ORAL EVERY 6 HOURS PRN
Qty: 30 TABLET | Refills: 0 | Status: SHIPPED | OUTPATIENT
Start: 2025-03-11

## 2025-03-11 RX ORDER — CLINDAMYCIN HYDROCHLORIDE 300 MG/1
300 CAPSULE ORAL 3 TIMES DAILY
Qty: 21 CAPSULE | Refills: 0 | Status: ACTIVE | OUTPATIENT
Start: 2025-03-11 | End: 2025-03-18

## 2025-03-11 RX ADMIN — CLINDAMYCIN HYDROCHLORIDE 300 MG: 150 CAPSULE ORAL at 20:55

## 2025-03-11 RX ADMIN — BUPIVACAINE HYDROCHLORIDE AND EPINEPHRINE BITARTRATE 10 ML: 5; .0091 INJECTION, SOLUTION EPIDURAL; INTRACAUDAL; PERINEURAL at 20:45

## 2025-03-11 ASSESSMENT — FIBROSIS 4 INDEX: FIB4 SCORE: 0.23

## 2025-03-12 NOTE — DISCHARGE INSTRUCTIONS
Were seen in the emergency department for a painful tooth.  You have exposure of the root of the tooth which is causing your pain.  We did prescribe some antibiotics to treat any superimposed infection.  Please take Tylenol and ibuprofen every 6 hours to help with pain.  Ultimately need to get into see a dentist to get the tooth extracted or you will continue to have pain from this tooth.

## 2025-03-12 NOTE — ED TRIAGE NOTES
"Chief Complaint   Patient presents with    Dental Pain     Right lower tooth x2 weeks     Patient ambulatory to triage for the above complaint. Patient states that she chipped the tooth a couple weeks ago and had been having increasingly worse pain since then. Patient states that she has gum disease and her teeth rot rapidly. Patient feels like she can feel the nerve when she touches it with her tongue and that the pain radiates up into her ear.    Patient tried taking Tylenol, Aleve, Ibuprofen, and Oragel with no relief.    Pt is alert and oriented, speaking in full sentences, follows commands and responds appropriately to questions. Resp are even and unlabored.      Pt placed in lobby. Pt educated on triage process. Pt encouraged to alert staff for any changes.     Patient and staff wearing appropriate PPE.    /71   Pulse (!) 103   Temp 37.1 °C (98.8 °F) (Temporal)   Resp 20   Ht 1.676 m (5' 6\")   Wt 54 kg (119 lb 0.8 oz)   SpO2 98%     "

## 2025-03-12 NOTE — ED NOTES
Pt. Able to ambulate out of ED with steady gait.  Verbalized understanding of all discharge instructions and f/u.  No acute distress noted.   Rx discussed.

## 2025-03-12 NOTE — ED PROVIDER NOTES
ED Provider Note    CHIEF COMPLAINT  Chief Complaint   Patient presents with    Dental Pain     Right lower tooth x2 weeks       EXTERNAL RECORDS REVIEWED  Reviewed prior inpatient admission discharge summary 1/14/2025 admitted to the hospital for asthma exacerbation    HPI/ROS  LIMITATION TO HISTORY   Select: : None  OUTSIDE HISTORIAN(S):  Boyfriend at bedside    Alecia Keller is a 24 y.o. female with past medical history of asthma presenting to the emergency department for right mandibular molar pain.  Says that that she broke off a corner of the tooth and ever since then has been painful.  She does not have a dentist.  She denies any fevers chills swelling to the face.  No swelling to the back of the mouth: No sore throat.    PAST MEDICAL HISTORY   has a past medical history of Allergy, Anxiety, ASTHMA (11/3/2011), Asthma, Pleurisy (2008), and Pneumonia (2008).    SURGICAL HISTORY  patient denies any surgical history    FAMILY HISTORY  Family History   Problem Relation Age of Onset    Other Mother         liver disease    Diabetes Father     Hypertension Father     Cancer Maternal Grandfather         mult myeloma    Heart Disease Neg Hx     Hyperlipidemia Neg Hx     Stroke Neg Hx        SOCIAL HISTORY  Social History     Tobacco Use    Smoking status: Former     Current packs/day: 1.00     Average packs/day: 1 pack/day for 0.5 years (0.5 ttl pk-yrs)     Types: Cigarettes    Smokeless tobacco: Never   Vaping Use    Vaping status: Former    Substances: Nicotine   Substance and Sexual Activity    Alcohol use: Yes    Drug use: Yes     Types: Marijuana, Methamphetamines, Cocaine, Intravenous, Inhaled     Comment: cocaine, weed, heroin, meth, fentanyl    Sexual activity: Yes     Partners: Male     Birth control/protection: Pill, Condom       CURRENT MEDICATIONS  Home Medications       Reviewed by Juanjo Liu R.N. (Registered Nurse) on 03/11/25 at 1949  Med List Status: Not Addressed     Medication  "Last Dose Status   albuterol (ACCUNEB) 0.63 MG/3ML nebulizer solution  Active   albuterol 108 (90 Base) MCG/ACT Aero Soln inhalation aerosol  Active   azithromycin (ZITHROMAX) 250 MG Tab  Active   betamethasone dipropionate (DIPROLENE) 0.05 % Cream  Active   drospirenone-ethinyl estradiol (RUDY) 3-0.02 MG per tablet  Active   fluticasone-salmeterol (ADVAIR) 100-50 MCG/ACT AEROSOL POWDER, BREATH ACTIVATED  Active   methylPREDNISolone (MEDROL DOSEPAK) 4 MG Tablet Therapy Pack  Active   NON SPECIFIED  Active   NS SOLN 60 mL with albuterol 2.5 mg/0.5 mL NEBU 5 mL  Active                    ALLERGIES  Allergies   Allergen Reactions    Pcn [Penicillins] Hives    Pcn [Penicillins]     Pineapple        PHYSICAL EXAM  VITAL SIGNS: /58   Pulse 99   Temp 37.1 °C (98.7 °F) (Temporal)   Resp 16   Ht 1.676 m (5' 6\")   Wt 54 kg (119 lb 0.8 oz)   SpO2 100%   BMI 19.21 kg/m²    General: Uncomfortable.  Non-toxic, no acute distress  Neuro: oriented x 3, moving all extremities.   HEENT:   - Head: Normocephalic, atraumatic  - Eyes: PERRL  - Ears/Nose: normal external nose and ears  - Mouth: Poor dentition.  There is severe degradation of tooth #32 with exposed nerve root.  No periapical abscess mild gingivitis        DIAGNOSTIC STUDIES / PROCEDURES    LABS and ECG  Results for orders placed or performed during the hospital encounter of 01/29/25   BETA-HCG QUALITATIVE SERUM    Collection Time: 01/29/25  6:22 AM   Result Value Ref Range    Beta-Hcg Qualitative Serum Negative Negative       RADIOLOGY  I have independently interpreted the diagnostic imaging associated with this visit and am waiting the final reading from the radiologist.   My preliminary interpretation is as follows:   -   Radiologist interpretation:   No orders to display           MEDICAL DECISION MAKING      ED COURSE AND PLAN    24-year-old female presenting for tooth pain.  She has degradation of tooth #32 with exposed nerve root.  There is surrounding " gingivitis but no obvious abscess.  There is no facial asymmetry.  There is no evidence of Luc's angina.   Patient was treated with clindamycin here in the emergency department I performed an inferior alveolar nerve block for pain control.  She was a local dental resources sheet.   I prescribed her a course of clindamycin, Tylenol, ibuprofen.  She is appropriate for discharge.           Procedures:    Dental Block Procedure Note    Verbal consent obtained from the patient, risks and benefits were discussed.  Consent was provided by the patient and/or guardian.  Patient exhibits understanding of the procedure being performed    Indication: Dental pain    Procedure:    Inferior alveolar nerve block: The cheek was retracted with my thumb and I palpated the retromolar fossa identifying landmarks for the location of the inferior alveolar nerve.  Contacting the ramus of the mandible inserted the needle tip approximately 1 cm above the occlusion will surface of the molars and advanced the needle until I contacted the mandibular ramus.syringe was aspirated to ensure the needle was not in a vascular space and subsequently 3 cc of 0.5% bupivacaine  with epinephrine was injected into the tissue to allow to diffuse.        The patient tolerated the procedure well.    Complications: None       ----------------------------------------------------------------------------------  DISCUSSIONS    I have discussed management of the patient with the following physicians and JW's:      Discussion of management with other QHP or appropriate source(s):     Escalation of care considered, and ultimately not performed: Considered no indication for labs, CT face    Barriers to care at this time, including but not limited to: Limited financial capacity    Decision tools and prescription drugs considered including, but not limited to: Prescribed clindamycin Tylenol ibuprofen    FINAL IMPRESSION    1. Dental infection    2. Closed fracture of  tooth, initial encounter        Discharge Medication List as of 3/11/2025  9:16 PM        START taking these medications    Details   clindamycin (CLEOCIN) 300 MG Cap Take 1 Capsule by mouth 3 times a day for 7 days., Disp-21 Capsule, R-0, Normal      ibuprofen (MOTRIN) 400 MG Tab Take 1 Tablet by mouth every 6 hours as needed for Moderate Pain., Disp-30 Tablet, R-0, Normal      acetaminophen (TYLENOL) 325 MG Tab Take 3 Tablets by mouth every 6 hours as needed for Mild Pain., Disp-30 Tablet, R-0, Normal             No follow-up provider specified.      DISPOSITION    Discharge home, Stable        This chart was dictated using an electronic voice recognition software. The chart has been reviewed and edited but there is still possibility for dictation errors due to limitation of software.    Trent Hernandez,  3/12/2025

## 2025-03-12 NOTE — ED NOTES
Patient ambulatory with a steady gait from the lobby to the room with one visitor at bedside. Patient connected to [facility] and BP. Chart up for ERP.

## 2025-04-07 ENCOUNTER — HOSPITAL ENCOUNTER (INPATIENT)
Facility: MEDICAL CENTER | Age: 25
LOS: 3 days | DRG: 202 | End: 2025-04-10
Attending: EMERGENCY MEDICINE | Admitting: INTERNAL MEDICINE
Payer: COMMERCIAL

## 2025-04-07 ENCOUNTER — OFFICE VISIT (OUTPATIENT)
Dept: URGENT CARE | Facility: CLINIC | Age: 25
End: 2025-04-07
Payer: COMMERCIAL

## 2025-04-07 ENCOUNTER — APPOINTMENT (OUTPATIENT)
Dept: RADIOLOGY | Facility: MEDICAL CENTER | Age: 25
DRG: 202 | End: 2025-04-07
Attending: EMERGENCY MEDICINE
Payer: COMMERCIAL

## 2025-04-07 VITALS
HEART RATE: 121 BPM | BODY MASS INDEX: 19.85 KG/M2 | SYSTOLIC BLOOD PRESSURE: 114 MMHG | RESPIRATION RATE: 19 BRPM | HEIGHT: 66 IN | TEMPERATURE: 98.3 F | DIASTOLIC BLOOD PRESSURE: 70 MMHG | OXYGEN SATURATION: 92 % | WEIGHT: 123.5 LBS

## 2025-04-07 DIAGNOSIS — K04.7 DENTAL INFECTION: ICD-10-CM

## 2025-04-07 DIAGNOSIS — J45.20 MILD INTERMITTENT ASTHMA WITHOUT COMPLICATION: ICD-10-CM

## 2025-04-07 DIAGNOSIS — J45.901 EXACERBATION OF ASTHMA, UNSPECIFIED ASTHMA SEVERITY, UNSPECIFIED WHETHER PERSISTENT: ICD-10-CM

## 2025-04-07 DIAGNOSIS — M62.838 MUSCLE SPASM: ICD-10-CM

## 2025-04-07 DIAGNOSIS — J45.52 SEVERE PERSISTENT ASTHMA WITH STATUS ASTHMATICUS: ICD-10-CM

## 2025-04-07 PROBLEM — J45.902 STATUS ASTHMATICUS: Status: ACTIVE | Noted: 2025-04-07

## 2025-04-07 PROBLEM — D72.829 LEUKOCYTOSIS: Status: ACTIVE | Noted: 2025-04-07

## 2025-04-07 LAB
ALBUMIN SERPL BCP-MCNC: 4 G/DL (ref 3.2–4.9)
ALBUMIN/GLOB SERPL: 1.3 G/DL
ALP SERPL-CCNC: 103 U/L (ref 30–99)
ALT SERPL-CCNC: 18 U/L (ref 2–50)
ANION GAP SERPL CALC-SCNC: 10 MMOL/L (ref 7–16)
AST SERPL-CCNC: 25 U/L (ref 12–45)
BASOPHILS # BLD AUTO: 0.5 % (ref 0–1.8)
BASOPHILS # BLD: 0.06 K/UL (ref 0–0.12)
BILIRUB SERPL-MCNC: <0.2 MG/DL (ref 0.1–1.5)
BUN SERPL-MCNC: 20 MG/DL (ref 8–22)
CALCIUM ALBUM COR SERPL-MCNC: 8.9 MG/DL (ref 8.5–10.5)
CALCIUM SERPL-MCNC: 8.9 MG/DL (ref 8.5–10.5)
CHLORIDE SERPL-SCNC: 102 MMOL/L (ref 96–112)
CO2 SERPL-SCNC: 28 MMOL/L (ref 20–33)
CREAT SERPL-MCNC: 0.82 MG/DL (ref 0.5–1.4)
EKG IMPRESSION: NORMAL
EOSINOPHIL # BLD AUTO: 1.05 K/UL (ref 0–0.51)
EOSINOPHIL NFR BLD: 8.6 % (ref 0–6.9)
ERYTHROCYTE [DISTWIDTH] IN BLOOD BY AUTOMATED COUNT: 47.8 FL (ref 35.9–50)
GFR SERPLBLD CREATININE-BSD FMLA CKD-EPI: 101 ML/MIN/1.73 M 2
GLOBULIN SER CALC-MCNC: 3.1 G/DL (ref 1.9–3.5)
GLUCOSE SERPL-MCNC: 108 MG/DL (ref 65–99)
HCG SERPL QL: NEGATIVE
HCT VFR BLD AUTO: 40.8 % (ref 37–47)
HGB BLD-MCNC: 13.2 G/DL (ref 12–16)
IMM GRANULOCYTES # BLD AUTO: 0.06 K/UL (ref 0–0.11)
IMM GRANULOCYTES NFR BLD AUTO: 0.5 % (ref 0–0.9)
LYMPHOCYTES # BLD AUTO: 2.91 K/UL (ref 1–4.8)
LYMPHOCYTES NFR BLD: 23.9 % (ref 22–41)
MAGNESIUM SERPL-MCNC: 3.5 MG/DL (ref 1.5–2.5)
MCH RBC QN AUTO: 28.4 PG (ref 27–33)
MCHC RBC AUTO-ENTMCNC: 32.4 G/DL (ref 32.2–35.5)
MCV RBC AUTO: 87.9 FL (ref 81.4–97.8)
MONOCYTES # BLD AUTO: 1.03 K/UL (ref 0–0.85)
MONOCYTES NFR BLD AUTO: 8.4 % (ref 0–13.4)
NEUTROPHILS # BLD AUTO: 7.08 K/UL (ref 1.82–7.42)
NEUTROPHILS NFR BLD: 58.1 % (ref 44–72)
NRBC # BLD AUTO: 0 K/UL
NRBC BLD-RTO: 0 /100 WBC (ref 0–0.2)
PLATELET # BLD AUTO: 359 K/UL (ref 164–446)
PMV BLD AUTO: 10.1 FL (ref 9–12.9)
POTASSIUM SERPL-SCNC: 4.1 MMOL/L (ref 3.6–5.5)
PROT SERPL-MCNC: 7.1 G/DL (ref 6–8.2)
RBC # BLD AUTO: 4.64 M/UL (ref 4.2–5.4)
SODIUM SERPL-SCNC: 140 MMOL/L (ref 135–145)
WBC # BLD AUTO: 12.2 K/UL (ref 4.8–10.8)

## 2025-04-07 PROCEDURE — 85025 COMPLETE CBC W/AUTO DIFF WBC: CPT

## 2025-04-07 PROCEDURE — 700111 HCHG RX REV CODE 636 W/ 250 OVERRIDE (IP): Mod: JZ | Performed by: INTERNAL MEDICINE

## 2025-04-07 PROCEDURE — 36415 COLL VENOUS BLD VENIPUNCTURE: CPT

## 2025-04-07 PROCEDURE — 83735 ASSAY OF MAGNESIUM: CPT

## 2025-04-07 PROCEDURE — 700101 HCHG RX REV CODE 250: Performed by: INTERNAL MEDICINE

## 2025-04-07 PROCEDURE — 770022 HCHG ROOM/CARE - ICU (200)

## 2025-04-07 PROCEDURE — 700105 HCHG RX REV CODE 258: Performed by: INTERNAL MEDICINE

## 2025-04-07 PROCEDURE — 84703 CHORIONIC GONADOTROPIN ASSAY: CPT

## 2025-04-07 PROCEDURE — 99291 CRITICAL CARE FIRST HOUR: CPT

## 2025-04-07 PROCEDURE — 99292 CRITICAL CARE ADDL 30 MIN: CPT | Performed by: INTERNAL MEDICINE

## 2025-04-07 PROCEDURE — 700102 HCHG RX REV CODE 250 W/ 637 OVERRIDE(OP): Performed by: INTERNAL MEDICINE

## 2025-04-07 PROCEDURE — 94640 AIRWAY INHALATION TREATMENT: CPT

## 2025-04-07 PROCEDURE — 700111 HCHG RX REV CODE 636 W/ 250 OVERRIDE (IP): Performed by: EMERGENCY MEDICINE

## 2025-04-07 PROCEDURE — 5A09458 ASSISTANCE WITH RESPIRATORY VENTILATION, 24-96 CONSECUTIVE HOURS, INTERMITTENT POSITIVE AIRWAY PRESSURE: ICD-10-PCS | Performed by: INTERNAL MEDICINE

## 2025-04-07 PROCEDURE — 94640 AIRWAY INHALATION TREATMENT: CPT | Performed by: PHYSICIAN ASSISTANT

## 2025-04-07 PROCEDURE — 96365 THER/PROPH/DIAG IV INF INIT: CPT

## 2025-04-07 PROCEDURE — 700111 HCHG RX REV CODE 636 W/ 250 OVERRIDE (IP): Mod: UD

## 2025-04-07 PROCEDURE — 99213 OFFICE O/P EST LOW 20 MIN: CPT | Mod: 25 | Performed by: PHYSICIAN ASSISTANT

## 2025-04-07 PROCEDURE — A9270 NON-COVERED ITEM OR SERVICE: HCPCS | Performed by: INTERNAL MEDICINE

## 2025-04-07 PROCEDURE — 94660 CPAP INITIATION&MGMT: CPT

## 2025-04-07 PROCEDURE — 93005 ELECTROCARDIOGRAM TRACING: CPT | Mod: TC

## 2025-04-07 PROCEDURE — 80053 COMPREHEN METABOLIC PANEL: CPT

## 2025-04-07 PROCEDURE — 93005 ELECTROCARDIOGRAM TRACING: CPT | Mod: TC | Performed by: EMERGENCY MEDICINE

## 2025-04-07 PROCEDURE — 99291 CRITICAL CARE FIRST HOUR: CPT | Performed by: INTERNAL MEDICINE

## 2025-04-07 PROCEDURE — 71045 X-RAY EXAM CHEST 1 VIEW: CPT

## 2025-04-07 RX ORDER — PREDNISONE 20 MG/1
40 TABLET ORAL DAILY
Qty: 10 TABLET | Refills: 0 | Status: SHIPPED | OUTPATIENT
Start: 2025-04-07 | End: 2025-04-07

## 2025-04-07 RX ORDER — CLINDAMYCIN PHOSPHATE 600 MG/50ML
600 INJECTION, SOLUTION INTRAVENOUS ONCE
Status: COMPLETED | OUTPATIENT
Start: 2025-04-07 | End: 2025-04-07

## 2025-04-07 RX ORDER — MAGNESIUM SULFATE HEPTAHYDRATE 40 MG/ML
2 INJECTION, SOLUTION INTRAVENOUS ONCE
Status: COMPLETED | OUTPATIENT
Start: 2025-04-07 | End: 2025-04-07

## 2025-04-07 RX ORDER — BUDESONIDE 0.25 MG/2ML
0.25 INHALANT ORAL
Status: DISCONTINUED | OUTPATIENT
Start: 2025-04-07 | End: 2025-04-10 | Stop reason: HOSPADM

## 2025-04-07 RX ORDER — IPRATROPIUM BROMIDE AND ALBUTEROL SULFATE 2.5; .5 MG/3ML; MG/3ML
3 SOLUTION RESPIRATORY (INHALATION) ONCE
Status: COMPLETED | OUTPATIENT
Start: 2025-04-07 | End: 2025-04-07

## 2025-04-07 RX ORDER — ENOXAPARIN SODIUM 100 MG/ML
40 INJECTION SUBCUTANEOUS DAILY
Status: DISCONTINUED | OUTPATIENT
Start: 2025-04-07 | End: 2025-04-10 | Stop reason: HOSPADM

## 2025-04-07 RX ORDER — LORAZEPAM 2 MG/ML
1 INJECTION INTRAMUSCULAR
Status: DISCONTINUED | OUTPATIENT
Start: 2025-04-07 | End: 2025-04-07

## 2025-04-07 RX ORDER — ENOXAPARIN SODIUM 100 MG/ML
30 INJECTION SUBCUTANEOUS EVERY 12 HOURS
Status: DISCONTINUED | OUTPATIENT
Start: 2025-04-07 | End: 2025-04-07

## 2025-04-07 RX ORDER — ACETAMINOPHEN 325 MG/1
650 TABLET ORAL EVERY 4 HOURS PRN
Status: DISCONTINUED | OUTPATIENT
Start: 2025-04-07 | End: 2025-04-10 | Stop reason: HOSPADM

## 2025-04-07 RX ORDER — IPRATROPIUM BROMIDE AND ALBUTEROL SULFATE 2.5; .5 MG/3ML; MG/3ML
3 SOLUTION RESPIRATORY (INHALATION)
Status: DISCONTINUED | OUTPATIENT
Start: 2025-04-07 | End: 2025-04-10 | Stop reason: HOSPADM

## 2025-04-07 RX ORDER — MIDAZOLAM HYDROCHLORIDE 1 MG/ML
2 INJECTION INTRAMUSCULAR; INTRAVENOUS
Status: DISCONTINUED | OUTPATIENT
Start: 2025-04-07 | End: 2025-04-08

## 2025-04-07 RX ORDER — CYCLOBENZAPRINE HCL 10 MG
10 TABLET ORAL 3 TIMES DAILY PRN
Status: DISCONTINUED | OUTPATIENT
Start: 2025-04-07 | End: 2025-04-10 | Stop reason: HOSPADM

## 2025-04-07 RX ORDER — ALBUTEROL SULFATE 90 UG/1
2 INHALANT RESPIRATORY (INHALATION) EVERY 6 HOURS PRN
Qty: 8.5 G | Refills: 0 | Status: ON HOLD | OUTPATIENT
Start: 2025-04-07 | End: 2025-04-10

## 2025-04-07 RX ORDER — IPRATROPIUM BROMIDE AND ALBUTEROL SULFATE 2.5; .5 MG/3ML; MG/3ML
SOLUTION RESPIRATORY (INHALATION)
Status: DISPENSED
Start: 2025-04-07 | End: 2025-04-08

## 2025-04-07 RX ORDER — FLUTICASONE PROPIONATE AND SALMETEROL 250; 50 UG/1; UG/1
1 POWDER RESPIRATORY (INHALATION) EVERY 12 HOURS
Qty: 2 EACH | Refills: 0 | Status: SHIPPED | OUTPATIENT
Start: 2025-04-07

## 2025-04-07 RX ORDER — DEXMEDETOMIDINE HYDROCHLORIDE 4 UG/ML
.1-1.5 INJECTION, SOLUTION INTRAVENOUS CONTINUOUS
Status: DISCONTINUED | OUTPATIENT
Start: 2025-04-07 | End: 2025-04-08

## 2025-04-07 RX ORDER — ALBUTEROL SULFATE 0.63 MG/3ML
0.63 SOLUTION RESPIRATORY (INHALATION) EVERY 4 HOURS PRN
Qty: 90 ML | Refills: 0 | Status: SHIPPED | OUTPATIENT
Start: 2025-04-07

## 2025-04-07 RX ORDER — LORAZEPAM 2 MG/ML
2 INJECTION INTRAMUSCULAR ONCE
Status: DISCONTINUED | OUTPATIENT
Start: 2025-04-07 | End: 2025-04-07

## 2025-04-07 RX ORDER — LOPERAMIDE HYDROCHLORIDE 2 MG/1
2 CAPSULE ORAL 4 TIMES DAILY PRN
Status: DISCONTINUED | OUTPATIENT
Start: 2025-04-07 | End: 2025-04-10 | Stop reason: HOSPADM

## 2025-04-07 RX ORDER — MIDAZOLAM HYDROCHLORIDE 1 MG/ML
4 INJECTION INTRAMUSCULAR; INTRAVENOUS ONCE
Status: COMPLETED | OUTPATIENT
Start: 2025-04-07 | End: 2025-04-07

## 2025-04-07 RX ORDER — IPRATROPIUM BROMIDE AND ALBUTEROL SULFATE 2.5; .5 MG/3ML; MG/3ML
3 SOLUTION RESPIRATORY (INHALATION)
Status: DISCONTINUED | OUTPATIENT
Start: 2025-04-07 | End: 2025-04-10

## 2025-04-07 RX ORDER — METHYLPREDNISOLONE SODIUM SUCCINATE 125 MG/2ML
62.5 INJECTION, POWDER, LYOPHILIZED, FOR SOLUTION INTRAMUSCULAR; INTRAVENOUS EVERY 8 HOURS
Status: DISCONTINUED | OUTPATIENT
Start: 2025-04-07 | End: 2025-04-08

## 2025-04-07 RX ORDER — MAGNESIUM SULFATE HEPTAHYDRATE 40 MG/ML
INJECTION, SOLUTION INTRAVENOUS
Status: COMPLETED
Start: 2025-04-07 | End: 2025-04-07

## 2025-04-07 RX ORDER — CLINDAMYCIN HYDROCHLORIDE 300 MG/1
300 CAPSULE ORAL 3 TIMES DAILY
Qty: 21 CAPSULE | Refills: 0 | Status: ON HOLD | OUTPATIENT
Start: 2025-04-07 | End: 2025-04-10

## 2025-04-07 RX ORDER — ONDANSETRON 2 MG/ML
4 INJECTION INTRAMUSCULAR; INTRAVENOUS EVERY 4 HOURS PRN
Status: DISCONTINUED | OUTPATIENT
Start: 2025-04-07 | End: 2025-04-10 | Stop reason: HOSPADM

## 2025-04-07 RX ADMIN — METHYLPREDNISOLONE SODIUM SUCCINATE 62.5 MG: 125 INJECTION, POWDER, FOR SOLUTION INTRAMUSCULAR; INTRAVENOUS at 23:33

## 2025-04-07 RX ADMIN — MAGNESIUM SULFATE HEPTAHYDRATE 2 G: 2 INJECTION, SOLUTION INTRAVENOUS at 14:55

## 2025-04-07 RX ADMIN — METHYLPREDNISOLONE SODIUM SUCCINATE 62.5 MG: 125 INJECTION, POWDER, FOR SOLUTION INTRAMUSCULAR; INTRAVENOUS at 16:47

## 2025-04-07 RX ADMIN — IPRATROPIUM BROMIDE AND ALBUTEROL SULFATE 3 ML: .5; 2.5 SOLUTION RESPIRATORY (INHALATION) at 16:25

## 2025-04-07 RX ADMIN — MAGNESIUM SULFATE HEPTAHYDRATE 2 G: 40 INJECTION, SOLUTION INTRAVENOUS at 14:55

## 2025-04-07 RX ADMIN — ENOXAPARIN SODIUM 40 MG: 100 INJECTION SUBCUTANEOUS at 17:59

## 2025-04-07 RX ADMIN — ACETAMINOPHEN 650 MG: 325 TABLET ORAL at 17:58

## 2025-04-07 RX ADMIN — MIDAZOLAM HYDROCHLORIDE 2 MG: 1 INJECTION, SOLUTION INTRAMUSCULAR; INTRAVENOUS at 23:45

## 2025-04-07 RX ADMIN — CYCLOBENZAPRINE 10 MG: 10 TABLET, FILM COATED ORAL at 17:58

## 2025-04-07 RX ADMIN — MIDAZOLAM HYDROCHLORIDE 4 MG: 1 INJECTION, SOLUTION INTRAMUSCULAR; INTRAVENOUS at 19:21

## 2025-04-07 RX ADMIN — CLINDAMYCIN IN 5 PERCENT DEXTROSE 600 MG: 12 INJECTION, SOLUTION INTRAVENOUS at 16:51

## 2025-04-07 RX ADMIN — IPRATROPIUM BROMIDE AND ALBUTEROL SULFATE 3 ML: .5; 2.5 SOLUTION RESPIRATORY (INHALATION) at 22:50

## 2025-04-07 RX ADMIN — IPRATROPIUM BROMIDE AND ALBUTEROL SULFATE 3 ML: 2.5; .5 SOLUTION RESPIRATORY (INHALATION) at 12:26

## 2025-04-07 RX ADMIN — DEXMEDETOMIDINE HYDROCHLORIDE 0.2 MCG/KG/HR: 100 INJECTION, SOLUTION INTRAVENOUS at 19:18

## 2025-04-07 ASSESSMENT — FIBROSIS 4 INDEX
FIB4 SCORE: 0.23
FIB4 SCORE: 0.23
FIB4 SCORE: 0.41

## 2025-04-07 ASSESSMENT — ENCOUNTER SYMPTOMS
WHEEZING: 1
GASTROINTESTINAL NEGATIVE: 1
WHEEZING: 1
DIZZINESS: 0
MUSCULOSKELETAL NEGATIVE: 1
VOMITING: 0
ABDOMINAL PAIN: 0
SHORTNESS OF BREATH: 1
HEADACHES: 0
NEUROLOGICAL NEGATIVE: 1
FEVER: 0
CHILLS: 0
NAUSEA: 0
SHORTNESS OF BREATH: 1
EYES NEGATIVE: 1
COUGH: 1
CARDIOVASCULAR NEGATIVE: 1
FEVER: 0
PSYCHIATRIC NEGATIVE: 1
CHILLS: 0

## 2025-04-07 ASSESSMENT — PAIN DESCRIPTION - PAIN TYPE
TYPE: ACUTE PAIN

## 2025-04-07 ASSESSMENT — PULMONARY FUNCTION TESTS
EPAP_CMH2O: 5
EPAP_CMH2O: 5

## 2025-04-07 NOTE — ASSESSMENT & PLAN NOTE
- admitted with status asthmaticus.   - Administer nebulized albuterol/ipratropium every 20 minutes for the first hour followed by every 4 hours and as needed in between.  -Titrate oxygen to SpO2 93 to 95%.  -Wean off BiPAP and monitor oxygenation pretty closely.  -Start methylprednisolone 62.5 every 8 hours for now.  Can transition to oral once stable.  -Keep n.p.o. for the first few hours on admission and then once stable allow oral diet.  -Continue nebulized budesonide twice daily.

## 2025-04-07 NOTE — H&P
ICU History & Physical Note    Date of Service  4/7/2025    Primary Care Physician  Pcp Pt States None    Consultants  critical care      Code Status  Full Code    Chief Complaint  Chief Complaint   Patient presents with    Shortness of Breath     BIB EMS for asthma exacerbation.  Pt. Received IM epi, IV epi, solumedrol, 1 albuterol, 2 duo neb tx, and magnesium en route.  Pt. Was unable to tolerate CPAP en route.  Pt.  Reports SOB started earlier today.          History of Presenting Illness  Alecia Keller is a 24 y.o. female who presented 4/7/2025 with worsening dyspnea. Symptoms worsened this morning. Attended urgent care and patient was given an albuterol inhaler and steroids.  Patient was then discharged home.  She progressively got worse at home.  EMS was called and patient was found to be hypoxic.  Patient was given 0.1 IM epinephrine followed by 0.3 IV, albuterol via nebulizer, Solu-Medrol and magnesium and was transported to the ER.  In the emergency room patient was started on BiPAP with some improvement and was given another dose of IM epinephrine.  Patient is currently on BiPAP.  In the emergency room patient states that she feels much better.  She is still feels wheezy but her shortness of breath is significantly improved.  Her vitals appear stable.  Labs consistent with leukocytosis.  Patient endorses compliance with her medications.  She states that she was intubated once in 2018 with asthma exacerbation.  She feels that her current asthma exacerbation was worse when she was intubated.    I discussed the plan of care with patient and emergency room physician .    Review of Systems  Review of Systems   Constitutional:  Negative for chills and fever.   HENT: Negative.     Eyes: Negative.    Respiratory:  Positive for shortness of breath and wheezing.    Cardiovascular: Negative.    Gastrointestinal: Negative.    Genitourinary: Negative.    Musculoskeletal: Negative.    Skin: Negative.     Neurological: Negative.    Endo/Heme/Allergies: Negative.    Psychiatric/Behavioral: Negative.         Past Medical History   has a past medical history of Allergy, Anxiety, ASTHMA (11/3/2011), Asthma, Pleurisy (2008), and Pneumonia (2008).    Surgical History   has no past surgical history on file.     Family History  family history includes Cancer in her maternal grandfather; Diabetes in her father; Hypertension in her father; Other in her mother.   Family history reviewed with patient. There is no family history that is pertinent to the chief complaint.     Social History   reports that she has quit smoking. Her smoking use included cigarettes. She has a 0.5 pack-year smoking history. She has never used smokeless tobacco. She reports current alcohol use. She reports current drug use. Drugs: Marijuana, Methamphetamines, Cocaine, Intravenous, and Inhaled.    Allergies  Allergies   Allergen Reactions    Pcn [Penicillins] Hives    Pcn [Penicillins]     Pineapple        Medications  Prior to Admission Medications   Prescriptions Last Dose Informant Patient Reported? Taking?   acetaminophen (TYLENOL) 325 MG Tab Unknown Historical No No   Sig: Take 3 Tablets by mouth every 6 hours as needed for Mild Pain.   albuterol (ACCUNEB) 0.63 MG/3ML nebulizer solution Unknown Historical No No   Sig: Take 3 mL by nebulization every four hours as needed for Shortness of Breath.   albuterol 108 (90 Base) MCG/ACT Aero Soln inhalation aerosol Unknown Historical No No   Sig: Inhale 2 Puffs every 6 hours as needed for Shortness of Breath.   clindamycin (CLEOCIN) 300 MG Cap Unknown Historical No No   Sig: Take 1 Capsule by mouth 3 times a day for 7 days.   fluticasone-salmeterol (ADVAIR) 250-50 MCG/ACT AEROSOL POWDER, BREATH ACTIVATED Unknown Historical No No   Sig: Inhale 1 Puff every 12 hours.   ibuprofen (MOTRIN) 400 MG Tab Unknown Historical No No   Sig: Take 1 Tablet by mouth every 6 hours as needed for Moderate Pain.     "  Facility-Administered Medications: None       Physical Exam  Temp:  [37.4 °C (99.4 °F)-37.6 °C (99.6 °F)] 37.4 °C (99.4 °F)  Pulse:  [105-136] 109  Resp:  [17-55] 55  BP: (116-142)/(67-91) 142/84  SpO2:  [91 %-100 %] 97 %  Blood Pressure: (!) 135/91   Temperature: 37.6 °C (99.6 °F)   Pulse: (!) 116   Respiration: 17   Pulse Oximetry: 100 %       Physical Exam  Constitutional:       Appearance: Normal appearance.   HENT:      Head: Normocephalic and atraumatic.      Nose: Nose normal.      Mouth/Throat:      Mouth: Mucous membranes are moist.   Eyes:      Pupils: Pupils are equal, round, and reactive to light.   Cardiovascular:      Rate and Rhythm: Tachycardia present.      Heart sounds: Normal heart sounds.   Pulmonary:      Breath sounds: Wheezing present.   Abdominal:      General: Abdomen is flat.   Musculoskeletal:      Cervical back: Normal range of motion.   Skin:     General: Skin is warm.      Capillary Refill: Capillary refill takes less than 2 seconds.   Neurological:      General: No focal deficit present.      Mental Status: She is alert.   Psychiatric:         Behavior: Behavior normal.         Laboratory:  Recent Labs     04/07/25  1456   WBC 12.2*   RBC 4.64   HEMOGLOBIN 13.2   HEMATOCRIT 40.8   MCV 87.9   MCH 28.4   MCHC 32.4   RDW 47.8   PLATELETCT 359   MPV 10.1     Recent Labs     04/07/25  1456   SODIUM 140   POTASSIUM 4.1   CHLORIDE 102   CO2 28   GLUCOSE 108*   BUN 20   CREATININE 0.82   CALCIUM 8.9     Recent Labs     04/07/25  1456   ALTSGPT 18   ASTSGOT 25   ALKPHOSPHAT 103*   TBILIRUBIN <0.2   GLUCOSE 108*         No results for input(s): \"NTPROBNP\" in the last 72 hours.      No results for input(s): \"TROPONINT\" in the last 72 hours.    Imaging:  DX-CHEST-PORTABLE (1 VIEW)   Final Result      No acute cardiopulmonary disease.              Assessment/Plan:  Justification for Admission Status  I anticipate this patient will require at least two midnights for appropriate medical management, " necessitating inpatient admission because status asthmaticus    Patient will need a ICU (Adult and Pediatrics) bed on MEDICAL service .  The need is secondary to acute asthma exacerbation.    * Status asthmaticus- (present on admission)  Assessment & Plan  - admitted with status asthmaticus.   - Administer nebulized albuterol/ipratropium every 20 minutes for the first hour followed by every 4 hours and as needed in between.  -Titrate oxygen to SpO2 93 to 95%.  -Wean off BiPAP and monitor oxygenation pretty closely.  -Start methylprednisolone 62.5 every 8 hours for now.  Can transition to oral once stable.  -Keep n.p.o. for the first few hours on admission and then once stable allow oral diet.  -Continue nebulized budesonide twice daily.    Muscle spasm  Assessment & Plan  Complains of intermittent muscle spasms.   Lower back.   Can use PRN cyclobenzaprine.     Leukocytosis  Assessment & Plan  - Likely reactive.  -No indication for antibiotics.    Drug abuse (HCC)- (present on admission)  Assessment & Plan  - History of recent drug use. Admits to using IV heroin and IV fentanyl.   - Anticipate withdrawal. Counseled patient.   -Seizure precautions. Aspiration precautions.   -Will need OP support on discharge.        VTE prophylaxis: Enoxaparin.      Chart and note reviewed. Data reviewed.   Total of 46 min critical care time spent at bedside during the course of care providing evaluation, management and care decisions and ordering appropriate treatment related to critical care problems exclusive of procedures.     Jose Salcedo MD  Pulmonary and Critical Care Physician

## 2025-04-07 NOTE — ASSESSMENT & PLAN NOTE
History of recent drug use. Admits to using IV heroin and IV fentanyl.   Has withdrawal symptoms: restless, hypertension, tachycardia   -She is on precedex for withdrawal,   -Seizure precautions. Aspiration precautions.   -Clonidine and Subutex   -Will need OP support on discharge.

## 2025-04-07 NOTE — ED NOTES
Med Rec complete per Reconcile Outside Information   Antibiotics in the past 30 days:yes  Anticoagulant in past 14 days:no  [facility] utilizes[facility]    Pt was dispensed antibiotics on 3/11/25 for a 7 day supply unable to verify if completed

## 2025-04-07 NOTE — ED NOTES
PT BIB [facility] per report pt was having difficulty breathing and went to  this AM, pt was given an albuterol inhaler and steroids. PT's difficulty breathing increased at home and boyfriend called 911.  Ems arrived and found PT to have on O2 sat of 66% on room air.  EMS gave PT 0.1 of EPI IM, at approx 1400, pt continued to have difficulty breathing, pt was given 0.3 of Epi IV, Albuterol, solumedrol, 2 mg of Magnesium and transported.  C-pap was attempted but pt could not tolerated it.  PT arrived with an O2 sat of 75% on room air, RT at bedside. MD at bedside, Pharmacy at bedside  Chief Complaint   Patient presents with    Shortness of Breath     BIB EMS for asthma exacerbation.  Pt. Received IM epi, IV epi, solumedrol, 1 albuterol, 2 duo neb tx, and magnesium en route.  Pt. Was unable to tolerate CPAP en route.  Pt.  Reports SOB started earlier today.

## 2025-04-07 NOTE — ED PROVIDER NOTES
"ER Provider Note    Scribed for Risa Judge M.d. by Aide Ruiz. 4/7/2025  3:34 PM    Primary Care Provider: Pcp Pt States None    CHIEF COMPLAINT   Chief Complaint   Patient presents with    Shortness of Breath     BIB EMS for asthma exacerbation.  Pt. Received IM epi, IV epi, solumedrol, 1 albuterol, 2 duo neb tx, and magnesium en route.  Pt. Was unable to tolerate CPAP en route.  Pt.  Reports SOB started earlier today.        EXTERNAL RECORDS REVIEWED  Inpatient Notes patient was admitted in [facility] for asthma exacerbation in January 2025.    HPI/ROS  LIMITATION TO HISTORY   Select: : None  OUTSIDE HISTORIAN(S):  None.    Alecia Keller is a 24 y.o. female who has history of asthma presents to the ED via EMS for evaluation of an asthma exacerbation onset earlier today. Per report, the patient was having difficulty breathing this morning and went to Urgent Care. The patient was given an albuterol inhaler and steroids at that time. The patient was discharged home, but her difficulty in breathing increased while at home, so she had her boyfriend call 911. EMS arrived and found the patient to have an oxygen saturation of 66% on room air. EMS treated the patient with 0.3 of epi IM at around 2 PM. The patient continued to have difficulty breathing, so she was given 0.1 of epi IV, Albuterol, solumedrol, 2 mg of magnesium and was then transported. C-PAP was attempted, but the patient was not able to tolerate it. The patient arrived with an oxygen saturation of 75% on room air.  Patient also reports that she has been dealing with a dental abscess over the last month or so.  She took antibiotics about a month ago, but the abscess just came back.  She says \"I think that the pus pocket broke and drained into my lungs.\"  She believes that is what probably triggered her asthma attack.  Patient has been intubated with her asthma in the past.  Her last intubation was when she was " approximately 17 years old.  History difficult to obtain due to patient's critical condition.  She is currently on BiPAP.    PAST MEDICAL HISTORY  Past Medical History:   Diagnosis Date    Allergy     Anxiety     ASTHMA 11/3/2011    Asthma     Pleurisy 2008    Pneumonia 2008     SURGICAL HISTORY  History reviewed. No pertinent surgical history.    FAMILY HISTORY  Family History   Problem Relation Age of Onset    Other Mother         liver disease    Diabetes Father     Hypertension Father     Cancer Maternal Grandfather         mult myeloma    Heart Disease Neg Hx     Hyperlipidemia Neg Hx     Stroke Neg Hx      SOCIAL HISTORY   reports that she has quit smoking. Her smoking use included cigarettes. She has a 0.5 pack-year smoking history. She has never used smokeless tobacco. She reports current alcohol use. She reports current drug use. Drugs: Marijuana, Methamphetamines, Cocaine, Intravenous, and Inhaled.    CURRENT MEDICATIONS  Current Discharge Medication List        CONTINUE these medications which have NOT CHANGED    Details   clindamycin (CLEOCIN) 300 MG Cap Take 1 Capsule by mouth 3 times a day for 7 days.  Qty: 21 Capsule, Refills: 0    Associated Diagnoses: Dental infection      albuterol 108 (90 Base) MCG/ACT Aero Soln inhalation aerosol Inhale 2 Puffs every 6 hours as needed for Shortness of Breath.  Qty: 8.5 g, Refills: 0    Associated Diagnoses: Exacerbation of asthma, unspecified asthma severity, unspecified whether persistent      fluticasone-salmeterol (ADVAIR) 250-50 MCG/ACT AEROSOL POWDER, BREATH ACTIVATED Inhale 1 Puff every 12 hours.  Qty: 2 Each, Refills: 0    Associated Diagnoses: Exacerbation of asthma, unspecified asthma severity, unspecified whether persistent      albuterol (ACCUNEB) 0.63 MG/3ML nebulizer solution Take 3 mL by nebulization every four hours as needed for Shortness of Breath.  Qty: 90 mL, Refills: 0    Associated Diagnoses: Exacerbation of asthma, unspecified asthma  "severity, unspecified whether persistent      ibuprofen (MOTRIN) 400 MG Tab Take 1 Tablet by mouth every 6 hours as needed for Moderate Pain.  Qty: 30 Tablet, Refills: 0    Associated Diagnoses: Dental infection; Closed fracture of tooth, initial encounter      acetaminophen (TYLENOL) 325 MG Tab Take 3 Tablets by mouth every 6 hours as needed for Mild Pain.  Qty: 30 Tablet, Refills: 0    Associated Diagnoses: Dental infection; Closed fracture of tooth, initial encounter           ALLERGIES  Pcn [penicillins], Pcn [penicillins], and Pineapple      PHYSICAL EXAM  /67   Pulse (!) 124   Temp 37.6 °C (99.6 °F) (Temporal)   Resp (!) 31   Ht 1.676 m (5' 6\")   Wt 55.8 kg (123 lb)   SpO2 100%   BMI 19.85 kg/m²     Constitutional: Well developed, well nourished; moderate to severe distress due to asthma exacerbation.  Currently on BiPAP.  Awake and alert.  Answers questions.  Speaks in 3-4 word sentences.  HENT: Normocephalic, atraumatic; Bilateral external ears normal; patient has a decayed/rotted right last lower molar with a adjacent pustule on the gingiva.  There are some very mild facial swelling right jawline.  No trismus.  No sublingual or submental swelling.  No drooling.  No stridor.  No trismus.  Eyes: PERRL, EOMI, Conjunctiva normal. No discharge.   Neck:  Supple, nontender midline; No stridor; No nuchal rigidity.   Lymphatic: No cervical lymphadenopathy noted.   Cardiovascular: Tachycardia.  Regular rhythm without murmurs, rubs, or gallop.   Thorax & Lungs: Diffuse expiratory wheezes bilaterally with decreased air movement.  Increased work of breathing.  Nontender chest wall. No crepitus or subcutaneous air  Abdomen: Soft, nontender, bowel sounds normal. No obvious masses; No pulsatile masses; no rebound, guarding, or peritoneal signs.   Skin: Good color; warm and dry without rash or petechia.  Extremities: Distal radial, dorsalis pedis, posterior tibial pulses are equal bilaterally; No edema; " Nontender calves or saphenous, No cyanosis, No clubbing.   Musculoskeletal: Good range of motion in all major joints. No tenderness to palpation or major deformities noted.   Neurologic: Alert & oriented x 4, clear speech.       DIAGNOSTIC STUDIES    EKG/LABS  Results for orders placed or performed during the hospital encounter of 04/07/25   CBC WITH DIFFERENTIAL    Collection Time: 04/07/25  2:56 PM   Result Value Ref Range    WBC 12.2 (H) 4.8 - 10.8 K/uL    RBC 4.64 4.20 - 5.40 M/uL    Hemoglobin 13.2 12.0 - 16.0 g/dL    Hematocrit 40.8 37.0 - 47.0 %    MCV 87.9 81.4 - 97.8 fL    MCH 28.4 27.0 - 33.0 pg    MCHC 32.4 32.2 - 35.5 g/dL    RDW 47.8 35.9 - 50.0 fL    Platelet Count 359 164 - 446 K/uL    MPV 10.1 9.0 - 12.9 fL    Neutrophils-Polys 58.10 44.00 - 72.00 %    Lymphocytes 23.90 22.00 - 41.00 %    Monocytes 8.40 0.00 - 13.40 %    Eosinophils 8.60 (H) 0.00 - 6.90 %    Basophils 0.50 0.00 - 1.80 %    Immature Granulocytes 0.50 0.00 - 0.90 %    Nucleated RBC 0.00 0.00 - 0.20 /100 WBC    Neutrophils (Absolute) 7.08 1.82 - 7.42 K/uL    Lymphs (Absolute) 2.91 1.00 - 4.80 K/uL    Monos (Absolute) 1.03 (H) 0.00 - 0.85 K/uL    Eos (Absolute) 1.05 (H) 0.00 - 0.51 K/uL    Baso (Absolute) 0.06 0.00 - 0.12 K/uL    Immature Granulocytes (abs) 0.06 0.00 - 0.11 K/uL    NRBC (Absolute) 0.00 K/uL   COMP METABOLIC PANEL    Collection Time: 04/07/25  2:56 PM   Result Value Ref Range    Sodium 140 135 - 145 mmol/L    Potassium 4.1 3.6 - 5.5 mmol/L    Chloride 102 96 - 112 mmol/L    Co2 28 20 - 33 mmol/L    Anion Gap 10.0 7.0 - 16.0    Glucose 108 (H) 65 - 99 mg/dL    Bun 20 8 - 22 mg/dL    Creatinine 0.82 0.50 - 1.40 mg/dL    Calcium 8.9 8.5 - 10.5 mg/dL    Correct Calcium 8.9 8.5 - 10.5 mg/dL    AST(SGOT) 25 12 - 45 U/L    ALT(SGPT) 18 2 - 50 U/L    Alkaline Phosphatase 103 (H) 30 - 99 U/L    Total Bilirubin <0.2 0.1 - 1.5 mg/dL    Albumin 4.0 3.2 - 4.9 g/dL    Total Protein 7.1 6.0 - 8.2 g/dL    Globulin 3.1 1.9 - 3.5 g/dL     A-G Ratio 1.3 g/dL   MAGNESIUM    Collection Time: 25  2:56 PM   Result Value Ref Range    Magnesium 3.5 (H) 1.5 - 2.5 mg/dL   HCG QUAL SERUM    Collection Time: 25  2:56 PM   Result Value Ref Range    Beta-Hcg Qualitative Serum Negative Negative   ESTIMATED GFR    Collection Time: 25  2:56 PM   Result Value Ref Range    GFR (CKD-EPI) 101 >60 mL/min/1.73 m 2   EKG (NOW)    Collection Time: 25  7:48 PM   Result Value Ref Range    Report       [facility] Emergency Dept.    Test Date:  2025  Pt Name:    WILLIAM MÉNDEZ              Department: ER  MRN:        [id number]                      Room:       RD 11  Gender:     Female                       Technician: [id number]  :        2000                   Requested By:ER TRIAGE PROTOCOL  Order #:    [id number]                    Reading MD: Risa Judge    Measurements  Intervals                                Axis  Rate:       129                          P:          88  AR:         135                          QRS:        71  QRSD:       77                           T:          66  QT:         287  QTc:        421    Interpretive Statements  Sinus tachycardia rate 129  Normal axis  Normal intervals  Increased baseline artifact  No obvious ST elevation or depression  Consider right atrial enlargement  Artifact in lead(s) I,II,III,aVR,aVL,aVF,V4,V5,V6  Compared to ECG 2024 20:39:54  Sinus rhythm no longer present  Electronically Signed On 20 19:48:53 PDT by Risa Judge        I have independently interpreted this EKG      RADIOLOGY/PROCEDURES   The attending emergency physician has independently interpreted the diagnostic imaging associated with this visit and am waiting the final reading from the radiologist.     My preliminary interpretation is a follows: ER MD is reviewed the patient's chest x-ray.  Chest x-ray reveals hyperinflated lungs.  No obvious pneumonia.    Radiologist  interpretation:  DX-CHEST-PORTABLE (1 VIEW)   Final Result      No acute cardiopulmonary disease.          COURSE & MEDICAL DECISION MAKING     ASSESSMENT, COURSE AND PLAN  Care Narrative: Patient presents to the ER via EMS in status asthmaticus.  She has a history of asthma.  She has been intubated in the past, although it has been several years since that happened.  She was just admitted in [facility] with asthma exacerbation in January of this year.  Patient went to urgent care today and was given DuoNebs and steroids.  She went home and got worse.  Upon EMS arrival patient's O2 sats were 66% on room air.  She was given Solu-Medrol, magnesium, DuoNeb, albuterol, intramuscular epinephrine and a small dose of IV epinephrine.  Upon arrival here to the ER she says she is feeling better although she still working hard to breathe.  We have her on BiPAP at this time.  Respiratory therapy is at bedside.  She is able to speak in 3-4 word sentences.  She is awake and alert.  She is moving air, but she is very wheezy.  I considered epi drip but will hold off for now she is tachycardic and does seem to be improving with all the measures that were given to her by EMS.  Patient will need to go to ICU.  I immediately contacted the intensivist who is currently come down to evaluate the patient.  He will admit her to his ICU.  Incidentally, patient says she has been dealing with a dental abscess for the last month.  She has a small pustule adjacent to a rotted right lower last molar.  There is minimal facial swelling along the right jaw.  No trismus or sublingual swelling.  No concern for Chema's.  She was given a dose of clindamycin here in the ER.  She will be admitted to the ICU under the care of Dr. Flanagan.  At this time further evaluation management done by the intensivist team.    3:03 PM - Patient seen and examined at bedside. This is a 24 year old woman who has history of asthma presents to the emergency  department for evaluation of an asthma exacerbation. Discussed plan of care, including performing an EKG, lab work and imaging. Patient agrees to the plan of care. The patient will be medicated with clindamycin 600 mg, magnesium sulfate 2 g. Ordered for EKG, HCG Qual Serum, CMP, CBC w/ Diff., and DX-Chest to evaluate her symptoms.      3:28 PM - Spoke with Dr. Salcedo (Hospitalist) about the patient's condition. He will hospitalize the patient for further evaluation and care.       ADDITIONAL PROBLEM LIST  Problem #1: Asthma exacerbation  Pulm #2: Dental abscess    DISPOSITION AND DISCUSSIONS  I have discussed management of the patient with the following physicians and JW's:  Dr. Salcedo (intensivist)    Discussion of management with other QHP or appropriate source(s): None     Escalation of care considered, and ultimately not performed: Considered epinephrine drip, the patient is improving with the measures given and route as well as with the BiPAP here.  Will hold off on epi drip for now.    Barriers to care at this time, including but not limited to: Patient does not have established PCP.     Decision tools and prescription drugs considered including, but not limited to: Antibiotics gave the patient clindamycin for her dental abscess. .    CRITICAL CARE  The very real possibilty of a deterioration of this patient's condition required the highest level of my preparedness for sudden, emergent intervention.  I provided critical care services, which included medication orders, frequent reevaluations of the patient's condition and response to treatment, ordering and reviewing test results, and discussing the case with various consultants.  The critical care time associated with the care of the patient was 35 minutes. Review chart for interventions. This time is exclusive of any other billable procedures.      DISPOSITION:  Patient will be hospitalized by Dr. Salcedo (intensivist) in guarded condition.     FINAL  DIAGNOSIS  1. Severe persistent asthma with status asthmaticus Acute   2. Dental infection Acute    The critical care time associated with the care of the patient was 35 minutes     Aide CURTIS (Scribe), am scribing for, and in the presence of, Risa Judge M.D..    Electronically signed by: Aide Ruiz (Scribe), 4/7/2025    Risa CURTIS M.D. personally performed the services described in this documentation, as scribed by Aide Ruiz in my presence, and it is both accurate and complete.     This dictation has been created using voice recognition software. The accuracy of the dictation is limited by the abilities of the software. I expect there may be some errors of grammar and possibly content. I made every attempt to manually correct the errors within my dictation. However, errors related to voice recognition software may still exist and should be interpreted within the appropriate context.      The note accurately reflects work and decisions made by me.  Risa Judge M.D.  4/7/2025  7:50 PM

## 2025-04-07 NOTE — ASSESSMENT & PLAN NOTE
Admitted with status asthmaticus.   After treatment, she has improved respiratory status, on room air.   -RT protocol  -Continue Duoneb   -Titrate oxygen to SpO2 93 to 95%.  -Continue methylprednisolone 62.5 every 8 hours for now.  Can transition to oral once stable.  -Continue nebulized budesonide twice daily.

## 2025-04-07 NOTE — PROGRESS NOTES
4 Eyes Skin Assessment Completed by IAIN Martinez and IAIN Acosta.    Head WDL  Ears WDL  Nose Redness and Blanching  Mouth WDL  Neck WDL  Breast/Chest WDL  Shoulder Blades WDL  Spine WDL  (R) Arm/Elbow/Hand WDL  (L) Arm/Elbow/Hand WDL  Abdomen Redness, Scab, and Abrasion  Groin WDL  Scrotum/Coccyx/Buttocks WDL  (R) Leg WDL  (L) Leg WDL  (R) Heel/Foot/Toe WDL  (L) Heel/Foot/Toe WDL          Devices In Places ECG, Blood Pressure Cuff, Pulse Ox, and Bipap      Interventions In Place Bipap Protecta-Gel and Pressure Redistribution Mattress    Possible Skin Injury No    Pictures Uploaded Into Epic N/A  Wound Consult Placed N/A  RN Wound Prevention Protocol Ordered No

## 2025-04-07 NOTE — PROGRESS NOTES
Subjective:     CHIEF COMPLAINT  Chief Complaint   Patient presents with    Abscess     Due to bad tooth, SOB, shallow breathing, x2 days.       HPI  Alecia Keller is a very pleasant 24 y.o. female who presents to the clinic with a suspected dental infection x 2 days.  Patient has a history of poor dentition and is awaiting dentistry follow-up for tooth extraction.  She has noted swelling and tenderness to the gums near her right bottom posterior molars.  States the swelling was larger yesterday, believes some pus likely drained overnight.  Has also been experiencing a flareup of her asthma experiencing shortness of breath, chest congestion and cough.  Recently ran out of her asthma medications.  Previously taking Advair, albuterol and using her nebulizer as needed.    REVIEW OF SYSTEMS  Review of Systems   Constitutional:  Negative for chills, fever and malaise/fatigue.   HENT:          Dental pain and gingival swelling   Respiratory:  Positive for cough, shortness of breath and wheezing.    Gastrointestinal:  Negative for abdominal pain, nausea and vomiting.   Neurological:  Negative for dizziness and headaches.       PAST MEDICAL HISTORY  Patient Active Problem List    Diagnosis Date Noted    Anxiety and depression 12/19/2016    Acne vulgaris 09/17/2014    Drug abuse (HCC) 08/25/2014    Asthma, mild intermittent 11/03/2011       SURGICAL HISTORY  patient denies any surgical history    ALLERGIES  Allergies   Allergen Reactions    Pcn [Penicillins] Hives    Pcn [Penicillins]     Pineapple        CURRENT MEDICATIONS  Home Medications       Reviewed by Anthony Gomez P.A.-C. (Physician Assistant) on 04/07/25 at 1210  Med List Status: <None>     Medication Last Dose Status   acetaminophen (TYLENOL) 325 MG Tab Not Taking Active   albuterol (ACCUNEB) 0.63 MG/3ML nebulizer solution Not Taking Active   albuterol 108 (90 Base) MCG/ACT Aero Soln inhalation aerosol Not Taking Active   azithromycin (ZITHROMAX) 250 MG  "Tab Not Taking Active   betamethasone dipropionate (DIPROLENE) 0.05 % Cream Not Taking Active   drospirenone-ethinyl estradiol (RUDY) 3-0.02 MG per tablet Not Taking Active   fluticasone-salmeterol (ADVAIR) 100-50 MCG/ACT AEROSOL POWDER, BREATH ACTIVATED Not Taking Active   ibuprofen (MOTRIN) 400 MG Tab Not Taking Active   methylPREDNISolone (MEDROL DOSEPAK) 4 MG Tablet Therapy Pack Not Taking Active   NON SPECIFIED Not Taking Active   NS SOLN 60 mL with albuterol 2.5 mg/0.5 mL NEBU 5 mL Not Taking Active                    SOCIAL HISTORY  Social History     Tobacco Use    Smoking status: Former     Current packs/day: 1.00     Average packs/day: 1 pack/day for 0.5 years (0.5 ttl pk-yrs)     Types: Cigarettes    Smokeless tobacco: Never   Vaping Use    Vaping status: Former    Substances: Nicotine   Substance and Sexual Activity    Alcohol use: Yes    Drug use: Yes     Types: Marijuana, Methamphetamines, Cocaine, Intravenous, Inhaled     Comment: cocaine, weed, heroin, meth, fentanyl    Sexual activity: Yes     Partners: Male     Birth control/protection: Pill, Condom       FAMILY HISTORY  Family History   Problem Relation Age of Onset    Other Mother         liver disease    Diabetes Father     Hypertension Father     Cancer Maternal Grandfather         mult myeloma    Heart Disease Neg Hx     Hyperlipidemia Neg Hx     Stroke Neg Hx           Objective:     VITAL SIGNS: /70 (BP Location: Left arm, Patient Position: Sitting, BP Cuff Size: Adult)   Pulse (!) 121   Temp 36.8 °C (98.3 °F) (Temporal)   Resp 19   Ht 1.676 m (5' 6\")   Wt 56 kg (123 lb 8 oz)   SpO2 92%   BMI 19.93 kg/m²     PHYSICAL EXAM  Physical Exam  Constitutional:       Appearance: Normal appearance.   HENT:      Head: Normocephalic and atraumatic.      Mouth/Throat:      Mouth: Mucous membranes are moist.      Dentition: Dental tenderness, gingival swelling and dental caries present.        Comments: Chronic dental fracture to the right " mandibular molar with associated gingival swelling.  No palpable area of fluctuance or abscess appreciated.  No active drainage.  Eyes:      Conjunctiva/sclera: Conjunctivae normal.   Cardiovascular:      Rate and Rhythm: Regular rhythm. Tachycardia present.      Pulses: Normal pulses.      Heart sounds: Normal heart sounds.   Pulmonary:      Effort: Pulmonary effort is normal.      Breath sounds: Wheezing present. No rhonchi or rales.   Musculoskeletal:      Cervical back: Normal range of motion.   Neurological:      Mental Status: She is alert.         Assessment/Plan:     1. Dental infection  - clindamycin (CLEOCIN) 300 MG Cap; Take 1 Capsule by mouth 3 times a day for 7 days.  Dispense: 21 Capsule; Refill: 0    2. Exacerbation of asthma, unspecified asthma severity, unspecified whether persistent  - ipratropium-albuterol (DUONEB) nebulizer solution  - predniSONE (DELTASONE) 20 MG Tab; Take 2 Tablets by mouth every day for 5 days.  Dispense: 10 Tablet; Refill: 0  - albuterol 108 (90 Base) MCG/ACT Aero Soln inhalation aerosol; Inhale 2 Puffs every 6 hours as needed for Shortness of Breath.  Dispense: 8.5 g; Refill: 0  - fluticasone-salmeterol (ADVAIR) 250-50 MCG/ACT AEROSOL POWDER, BREATH ACTIVATED; Inhale 1 Puff every 12 hours.  Dispense: 2 Each; Refill: 0  - albuterol (ACCUNEB) 0.63 MG/3ML nebulizer solution; Take 3 mL by nebulization every four hours as needed for Shortness of Breath.  Dispense: 90 mL; Refill: 0      MDM/Comments:    Pleasant 24-year-old female presenting to the clinic with dental pain/swelling as well as shortness of breath and chest congestion.  Symptoms have been ongoing for the last couple days.  Has a history of recurrent dental infections and is pending dental extraction.  On exam patient has gingival swelling to the right mandibular gingival near the molars.  No palpable or visual abscess present.  Dental fracture and dental caries present.  Will begin treatment with clindamycin close  dental follow-up discussed.  Patient has diffuse rhonchi and extra wheezing in all lung fields.  History of moderate persistent asthma.  Currently out of all medications.  DuoNeb treatment provided in clinic which provided some improvement for the patient and cleared lung sounds.  We will start her on a 5-day course of prednisone for asthma exacerbation.  Refill of Advair and albuterol provided.  Return/ED precautions discussed.    Differential diagnosis, natural history, supportive care, and indications for immediate follow-up discussed. All questions answered. Patient agrees with the plan of care.    Follow-up as needed if symptoms worsen or fail to improve to PCP, Urgent care or Emergency Room.    I have personally reviewed prior external notes and test results pertinent to today's visit.  I have independently reviewed and interpreted all diagnostics ordered during this urgent care acute visit.   Discussed management options (risks,benefits, and alternatives to treatment). Pt expresses understanding and the treatment plan was agreed upon. Questions were encouraged and answered to pt's satisfaction.    Please note that this dictation was created using voice recognition software. I have made a reasonable attempt to correct obvious errors, but I expect that there are errors of grammar and possibly content that I did not discover before finalizing the note.

## 2025-04-08 PROBLEM — E87.29 RESPIRATORY ACIDOSIS: Status: ACTIVE | Noted: 2025-04-08

## 2025-04-08 LAB
ANION GAP SERPL CALC-SCNC: 11 MMOL/L (ref 7–16)
BUN SERPL-MCNC: 14 MG/DL (ref 8–22)
CALCIUM SERPL-MCNC: 7.5 MG/DL (ref 8.5–10.5)
CHLORIDE SERPL-SCNC: 113 MMOL/L (ref 96–112)
CO2 SERPL-SCNC: 15 MMOL/L (ref 20–33)
CREAT SERPL-MCNC: 0.5 MG/DL (ref 0.5–1.4)
ERYTHROCYTE [DISTWIDTH] IN BLOOD BY AUTOMATED COUNT: 48.4 FL (ref 35.9–50)
FLUAV RNA SPEC QL NAA+PROBE: NEGATIVE
FLUBV RNA SPEC QL NAA+PROBE: NEGATIVE
GFR SERPLBLD CREATININE-BSD FMLA CKD-EPI: 133 ML/MIN/1.73 M 2
GLUCOSE SERPL-MCNC: 129 MG/DL (ref 65–99)
HCT VFR BLD AUTO: 33.9 % (ref 37–47)
HGB BLD-MCNC: 10.4 G/DL (ref 12–16)
MAGNESIUM SERPL-MCNC: 1.7 MG/DL (ref 1.5–2.5)
MCH RBC QN AUTO: 27.6 PG (ref 27–33)
MCHC RBC AUTO-ENTMCNC: 30.7 G/DL (ref 32.2–35.5)
MCV RBC AUTO: 89.9 FL (ref 81.4–97.8)
PLATELET # BLD AUTO: 228 K/UL (ref 164–446)
PMV BLD AUTO: 10.4 FL (ref 9–12.9)
POTASSIUM SERPL-SCNC: 4.1 MMOL/L (ref 3.6–5.5)
RBC # BLD AUTO: 3.77 M/UL (ref 4.2–5.4)
RSV RNA SPEC QL NAA+PROBE: NEGATIVE
SARS-COV-2 RNA RESP QL NAA+PROBE: NOTDETECTED
SODIUM SERPL-SCNC: 139 MMOL/L (ref 135–145)
SPECIMEN SOURCE: NORMAL
WBC # BLD AUTO: 4.3 K/UL (ref 4.8–10.8)

## 2025-04-08 PROCEDURE — 80048 BASIC METABOLIC PNL TOTAL CA: CPT

## 2025-04-08 PROCEDURE — 700111 HCHG RX REV CODE 636 W/ 250 OVERRIDE (IP): Performed by: EMERGENCY MEDICINE

## 2025-04-08 PROCEDURE — 94760 N-INVAS EAR/PLS OXIMETRY 1: CPT

## 2025-04-08 PROCEDURE — 94640 AIRWAY INHALATION TREATMENT: CPT

## 2025-04-08 PROCEDURE — 99254 IP/OBS CNSLTJ NEW/EST MOD 60: CPT | Performed by: HOSPITALIST

## 2025-04-08 PROCEDURE — 99233 SBSQ HOSP IP/OBS HIGH 50: CPT | Mod: GC | Performed by: EMERGENCY MEDICINE

## 2025-04-08 PROCEDURE — 770006 HCHG ROOM/CARE - MED/SURG/GYN SEMI*

## 2025-04-08 PROCEDURE — 700111 HCHG RX REV CODE 636 W/ 250 OVERRIDE (IP)

## 2025-04-08 PROCEDURE — 700105 HCHG RX REV CODE 258: Performed by: INTERNAL MEDICINE

## 2025-04-08 PROCEDURE — 85027 COMPLETE CBC AUTOMATED: CPT

## 2025-04-08 PROCEDURE — 700102 HCHG RX REV CODE 250 W/ 637 OVERRIDE(OP): Performed by: INTERNAL MEDICINE

## 2025-04-08 PROCEDURE — 0241U HCHG SARS-COV-2 COVID-19 NFCT DS RESP RNA 4 TRGT MIC: CPT

## 2025-04-08 PROCEDURE — A9270 NON-COVERED ITEM OR SERVICE: HCPCS | Performed by: INTERNAL MEDICINE

## 2025-04-08 PROCEDURE — 83735 ASSAY OF MAGNESIUM: CPT

## 2025-04-08 PROCEDURE — A9270 NON-COVERED ITEM OR SERVICE: HCPCS | Performed by: NURSE PRACTITIONER

## 2025-04-08 PROCEDURE — 700102 HCHG RX REV CODE 250 W/ 637 OVERRIDE(OP): Performed by: NURSE PRACTITIONER

## 2025-04-08 PROCEDURE — 700101 HCHG RX REV CODE 250: Performed by: INTERNAL MEDICINE

## 2025-04-08 PROCEDURE — A9270 NON-COVERED ITEM OR SERVICE: HCPCS | Performed by: EMERGENCY MEDICINE

## 2025-04-08 PROCEDURE — 700111 HCHG RX REV CODE 636 W/ 250 OVERRIDE (IP): Mod: JZ | Performed by: INTERNAL MEDICINE

## 2025-04-08 PROCEDURE — 700102 HCHG RX REV CODE 250 W/ 637 OVERRIDE(OP): Performed by: EMERGENCY MEDICINE

## 2025-04-08 RX ORDER — BENZONATATE 100 MG/1
100 CAPSULE ORAL 3 TIMES DAILY PRN
Status: DISCONTINUED | OUTPATIENT
Start: 2025-04-08 | End: 2025-04-10 | Stop reason: HOSPADM

## 2025-04-08 RX ORDER — BUPRENORPHINE 8 MG/1
8 TABLET SUBLINGUAL DAILY
Status: DISCONTINUED | OUTPATIENT
Start: 2025-04-09 | End: 2025-04-10 | Stop reason: HOSPADM

## 2025-04-08 RX ORDER — BUPRENORPHINE 2 MG/1
4 TABLET SUBLINGUAL EVERY 6 HOURS
Status: COMPLETED | OUTPATIENT
Start: 2025-04-08 | End: 2025-04-08

## 2025-04-08 RX ORDER — DIAZEPAM 10 MG/2ML
5 INJECTION, SOLUTION INTRAMUSCULAR; INTRAVENOUS EVERY 6 HOURS PRN
Status: DISCONTINUED | OUTPATIENT
Start: 2025-04-08 | End: 2025-04-08

## 2025-04-08 RX ORDER — DIAZEPAM 5 MG/1
5 TABLET ORAL EVERY 4 HOURS PRN
Status: DISCONTINUED | OUTPATIENT
Start: 2025-04-08 | End: 2025-04-10 | Stop reason: HOSPADM

## 2025-04-08 RX ORDER — OXYCODONE HYDROCHLORIDE 5 MG/1
5 TABLET ORAL EVERY 4 HOURS PRN
Refills: 0 | Status: DISCONTINUED | OUTPATIENT
Start: 2025-04-08 | End: 2025-04-10 | Stop reason: HOSPADM

## 2025-04-08 RX ORDER — CLONIDINE HYDROCHLORIDE 0.1 MG/1
0.2 TABLET ORAL EVERY 8 HOURS
Status: DISCONTINUED | OUTPATIENT
Start: 2025-04-08 | End: 2025-04-10 | Stop reason: HOSPADM

## 2025-04-08 RX ORDER — PREDNISONE 20 MG/1
40 TABLET ORAL DAILY
Status: DISCONTINUED | OUTPATIENT
Start: 2025-04-08 | End: 2025-04-10 | Stop reason: HOSPADM

## 2025-04-08 RX ORDER — LORAZEPAM 2 MG/ML
1 INJECTION INTRAMUSCULAR ONCE
Status: COMPLETED | OUTPATIENT
Start: 2025-04-08 | End: 2025-04-08

## 2025-04-08 RX ORDER — SODIUM BICARBONATE 650 MG/1
650 TABLET ORAL 4 TIMES DAILY
Status: COMPLETED | OUTPATIENT
Start: 2025-04-08 | End: 2025-04-08

## 2025-04-08 RX ADMIN — MIDAZOLAM HYDROCHLORIDE 2 MG: 1 INJECTION, SOLUTION INTRAMUSCULAR; INTRAVENOUS at 02:05

## 2025-04-08 RX ADMIN — IPRATROPIUM BROMIDE AND ALBUTEROL SULFATE 3 ML: .5; 2.5 SOLUTION RESPIRATORY (INHALATION) at 10:34

## 2025-04-08 RX ADMIN — SODIUM BICARBONATE 650 MG: 650 TABLET ORAL at 17:06

## 2025-04-08 RX ADMIN — IPRATROPIUM BROMIDE AND ALBUTEROL SULFATE 3 ML: .5; 2.5 SOLUTION RESPIRATORY (INHALATION) at 23:47

## 2025-04-08 RX ADMIN — DEXMEDETOMIDINE HYDROCHLORIDE 1.5 MCG/KG/HR: 100 INJECTION, SOLUTION INTRAVENOUS at 04:01

## 2025-04-08 RX ADMIN — BUPRENORPHINE HCL 4 MG: 2 TABLET SUBLINGUAL at 07:44

## 2025-04-08 RX ADMIN — SODIUM BICARBONATE 650 MG: 650 TABLET ORAL at 12:15

## 2025-04-08 RX ADMIN — IPRATROPIUM BROMIDE AND ALBUTEROL SULFATE 3 ML: .5; 2.5 SOLUTION RESPIRATORY (INHALATION) at 21:06

## 2025-04-08 RX ADMIN — BUPRENORPHINE HCL 4 MG: 2 TABLET SUBLINGUAL at 12:15

## 2025-04-08 RX ADMIN — LORAZEPAM 1 MG: 2 INJECTION INTRAMUSCULAR; INTRAVENOUS at 06:27

## 2025-04-08 RX ADMIN — SODIUM BICARBONATE 650 MG: 650 TABLET ORAL at 10:23

## 2025-04-08 RX ADMIN — IPRATROPIUM BROMIDE AND ALBUTEROL SULFATE 3 ML: .5; 2.5 SOLUTION RESPIRATORY (INHALATION) at 15:23

## 2025-04-08 RX ADMIN — DIAZEPAM 5 MG: 5 TABLET ORAL at 18:30

## 2025-04-08 RX ADMIN — IPRATROPIUM BROMIDE AND ALBUTEROL SULFATE 3 ML: .5; 2.5 SOLUTION RESPIRATORY (INHALATION) at 07:06

## 2025-04-08 RX ADMIN — ENOXAPARIN SODIUM 40 MG: 100 INJECTION SUBCUTANEOUS at 17:06

## 2025-04-08 RX ADMIN — SODIUM BICARBONATE 650 MG: 650 TABLET ORAL at 21:53

## 2025-04-08 RX ADMIN — MIDAZOLAM HYDROCHLORIDE 2 MG: 1 INJECTION, SOLUTION INTRAMUSCULAR; INTRAVENOUS at 05:23

## 2025-04-08 RX ADMIN — CYCLOBENZAPRINE 10 MG: 10 TABLET, FILM COATED ORAL at 17:06

## 2025-04-08 RX ADMIN — CLONIDINE HYDROCHLORIDE 0.2 MG: 0.1 TABLET ORAL at 07:19

## 2025-04-08 RX ADMIN — CYCLOBENZAPRINE 10 MG: 10 TABLET, FILM COATED ORAL at 02:00

## 2025-04-08 RX ADMIN — CLONIDINE HYDROCHLORIDE 0.2 MG: 0.1 TABLET ORAL at 14:08

## 2025-04-08 RX ADMIN — MIDAZOLAM HYDROCHLORIDE 2 MG: 1 INJECTION, SOLUTION INTRAMUSCULAR; INTRAVENOUS at 01:05

## 2025-04-08 RX ADMIN — DIAZEPAM 5 MG: 5 TABLET ORAL at 14:07

## 2025-04-08 RX ADMIN — PREDNISONE 40 MG: 20 TABLET ORAL at 12:14

## 2025-04-08 RX ADMIN — BUDESONIDE 0.25 MG: 0.25 SUSPENSION RESPIRATORY (INHALATION) at 07:05

## 2025-04-08 RX ADMIN — METHYLPREDNISOLONE SODIUM SUCCINATE 62.5 MG: 125 INJECTION, POWDER, FOR SOLUTION INTRAMUSCULAR; INTRAVENOUS at 05:10

## 2025-04-08 RX ADMIN — BENZONATATE 100 MG: 100 CAPSULE ORAL at 20:40

## 2025-04-08 ASSESSMENT — ENCOUNTER SYMPTOMS
HEADACHES: 0
BRUISES/BLEEDS EASILY: 0
MYALGIAS: 1
HEARTBURN: 0
FOCAL WEAKNESS: 0
FEVER: 0
BACK PAIN: 0
NERVOUS/ANXIOUS: 1
CHILLS: 0
DEPRESSION: 0
COUGH: 1
PALPITATIONS: 0
VOMITING: 0
NAUSEA: 0
SHORTNESS OF BREATH: 1
CARDIOVASCULAR NEGATIVE: 1
NECK PAIN: 0
DIZZINESS: 0
POLYDIPSIA: 0
WEAKNESS: 0
EYES NEGATIVE: 1
GASTROINTESTINAL NEGATIVE: 1
NEUROLOGICAL NEGATIVE: 1
WHEEZING: 1

## 2025-04-08 ASSESSMENT — PAIN DESCRIPTION - PAIN TYPE
TYPE: ACUTE PAIN

## 2025-04-08 ASSESSMENT — COPD QUESTIONNAIRES
DURING THE PAST 4 WEEKS HOW MUCH DID YOU FEEL SHORT OF BREATH: SOME OF THE TIME
DO YOU EVER COUGH UP ANY MUCUS OR PHLEGM?: NO/ONLY WITH OCCASIONAL COLDS OR INFECTIONS
COPD SCREENING SCORE: 4
HAVE YOU SMOKED AT LEAST 100 CIGARETTES IN YOUR ENTIRE LIFE: YES

## 2025-04-08 ASSESSMENT — FIBROSIS 4 INDEX: FIB4 SCORE: 0.65

## 2025-04-08 ASSESSMENT — LIFESTYLE VARIABLES: SUBSTANCE_ABUSE: 1

## 2025-04-08 NOTE — PROGRESS NOTES
Pulmonary Critical Care Progress Note      Asked to come to bedside to see the patient. Complains of severe anxiety.   Patient is complaining of severe distress and states that she is going in severe opioid withdrawal.   Uses IV fentanyl and IV heroin and last use was today.  She starts having withdrawal symptoms within hours of cessation of opioid use.    Airway: Maintaining  Breathing: On nasal cannula oxygen.  Sats persistently greater than 90%.  Bilateral air entry improved significantly with bronchodilators.  Circulation: Tachycardic.  Hemodynamically stable.  Disability: Diaphoretic, mydriasis.  Mental status is normal.  Restless, complains of abdominal cramping.    Impression:  Acute opioid withdrawal    Plan:  Fluid resuscitation with 500 cc IV boluses of lactated Ringer's.  Considering nonopioid adjunctive medication initially.  Will initiate Precedex infusion and increase as per response.  Can add benzodiazepines as needed if needed.  Treat nausea and vomiting with ondansetron 4 mg IV every 8 hours as needed.  If patients develop severe diarrhea consider loperamide 4 mg p.o. or octreotide 50 mcg subcu.  If symptoms get worse we can consider 10 mg IM methadone.    Discussed patient condition and risk of morbidity and/or mortality with RN.    The patient remains critically ill.  Critical care time = 32 minutes in directly providing and coordinating critical care and extensive data review.  No time overlap and excludes procedures.

## 2025-04-08 NOTE — PROGRESS NOTES
4 Eyes Skin Assessment Completed by IAIN Argueta and IAIN Hatch.    Head WDL  Ears WDL  Nose WDL  Mouth WDL  Neck WDL  Breast/Chest WDL  Shoulder Blades WDL  Spine WDL  (R) Arm/Elbow/Hand WDL  (L) Arm/Elbow/Hand WDL  Abdomen Cigarette burn  Groin WDL  Scrotum/Coccyx/Buttocks WDL  (R) Leg WDL  (L) Leg WDL  (R) Heel/Foot/Toe WDL  (L) Heel/Foot/Toe WDL          Devices In Places Blood Pressure Cuff and Pulse Ox      Interventions In Place Low Air Loss Mattress    Possible Skin Injury No    Pictures Uploaded Into Epic N/A  Wound Consult Placed N/A  RN Wound Prevention Protocol Ordered No

## 2025-04-08 NOTE — CARE PLAN
The patient is Watcher - Medium risk of patient condition declining or worsening    Shift Goals  Clinical Goals: hemodynamic stability, RASS 0, pain management  Patient Goals: pain management  Family Goals: BHARATHI    Progress made toward(s) clinical / shift goals:    Problem: Knowledge Deficit - Standard  Goal: Patient and family/care givers will demonstrate understanding of plan of care, disease process/condition, diagnostic tests and medications  Outcome: Progressing     Problem: Pain - Standard  Goal: Alleviation of pain or a reduction in pain to the patient’s comfort goal  Outcome: Progressing     Problem: Fall Risk  Goal: Patient will remain free from falls  Outcome: Progressing       Patient is not progressing towards the following goals:

## 2025-04-08 NOTE — CARE PLAN
The patient is Stable - Low risk of patient condition declining or worsening    Shift Goals  Clinical Goals: hemodynamic stability, RASS 0, pain management  Patient Goals: pain management  Family Goals: BHARATHI    Progress made toward(s) clinical / shift goals:    Problem: Knowledge Deficit - Standard  Goal: Patient and family/care givers will demonstrate understanding of plan of care, disease process/condition, diagnostic tests and medications  Outcome: Progressing     Problem: Pain - Standard  Goal: Alleviation of pain or a reduction in pain to the patient’s comfort goal  Outcome: Progressing     Problem: Fall Risk  Goal: Patient will remain free from falls  Outcome: Progressing       Patient is not progressing towards the following goals:

## 2025-04-08 NOTE — HOSPITAL COURSE
"Per Dr. Salcedo: \"Alecia Keller is a 24 y.o. female who presented 4/7/2025 with worsening dyspnea. Symptoms worsened this morning. Attended urgent care and patient was given an albuterol inhaler and steroids.  Patient was then discharged home.  She progressively got worse at home.  EMS was called and patient was found to be hypoxic.  Patient was given 0.1 IM epinephrine followed by 0.3 IV, albuterol via nebulizer, Solu-Medrol and magnesium and was transported to the ER.  In the emergency room patient was started on BiPAP with some improvement and was given another dose of IM epinephrine.  Patient is currently on BiPAP.  In the emergency room patient states that she feels much better.  She is still feels wheezy but her shortness of breath is significantly improved.  Her vitals appear stable.  Labs consistent with leukocytosis.  Patient endorses compliance with her medications.  She states that she was intubated once in 2018 with asthma exacerbation.  She feels that her current asthma exacerbation was worse than when she was intubated. \"  "

## 2025-04-08 NOTE — ASSESSMENT & PLAN NOTE
History of recent drug use. Admits to using IV heroin and IV fentanyl.   Has withdrawal symptoms: restless, hypertension, tachycardia   -Seizure precautions. Aspiration precautions.   -Clonidine and Subutex   -Will need OP support on discharge.

## 2025-04-08 NOTE — PROGRESS NOTES
"[facility] Internal Medicine ICU Daily Progress Note    Date of Service  4/8/2025    [facility] Team: [facility] ICU Gold Team   Attending: Spencer Fuentes M.d.  Senior Resident: Dr. Nino Medina  Contact Number: [phone]    Chief Complaint  Alecia Keller is a 24 y.o. female admitted 4/7/2025 with shortness of breath     Hospital Course  Per Dr. Salcedo: \"Alecia Keller is a 24 y.o. female who presented 4/7/2025 with worsening dyspnea. Symptoms worsened this morning. Attended urgent care and patient was given an albuterol inhaler and steroids.  Patient was then discharged home.  She progressively got worse at home.  EMS was called and patient was found to be hypoxic.  Patient was given 0.1 IM epinephrine followed by 0.3 IV, albuterol via nebulizer, Solu-Medrol and magnesium and was transported to the ER.  In the emergency room patient was started on BiPAP with some improvement and was given another dose of IM epinephrine.  Patient is currently on BiPAP.  In the emergency room patient states that she feels much better.  She is still feels wheezy but her shortness of breath is significantly improved.  Her vitals appear stable.  Labs consistent with leukocytosis.  Patient endorses compliance with her medications.  She states that she was intubated once in 2018 with asthma exacerbation.  She feels that her current asthma exacerbation was worse than when she was intubated. \"    Interval Problem Update  Since admission, patient is able to wean off the BiPAP and NC, she is on room air since midnight, no acute respiratory distress, BP slightly high 140s/90s, with intermittent tachycardia,  patient has opiate withdrawal symptoms, she is on Precedex infusion, however she is still anxious and pulled her IV out. IM Ativan given.   Lab reviewed: WBC dropped to 4.3 from 12.2, hemoglobin 10.4, dropped from 13.2, sodium 139, potassium 4.1, chlorides 113, CO2 15, magnesium 1.7.,  Creatinine 0.5, .  No new imaging.   I/O since " admission: -203.3 ml. with urine output 500 ml.   Patient will be transferred to medical floor.     I have discussed this patient's plan of care and discharge plan at IDT rounds today with Case Management, Nursing, Nursing leadership, and other members of the IDT team.    Consultants/Specialty  NA    Code Status  Full Code    Disposition  The patient is not medically cleared for discharge to home or a post-acute facility.    Patient will be transferred to medical floor.       Review of Systems  Review of Systems   Unable to perform ROS: Mental acuity    Patient is agitated ans received lorazepam, not able to answer questions.     Physical Exam  Temp:  [35.6 °C (96 °F)-37.6 °C (99.6 °F)] 35.7 °C (96.2 °F)  Pulse:  [] 104  Resp:  [17-79] 48  BP: (101-149)/() 121/78  SpO2:  [90 %-100 %] 90 %    Physical Exam  Constitutional:       Appearance: She is ill-appearing.      Comments: sleepy   HENT:      Head: Normocephalic.   Cardiovascular:      Rate and Rhythm: Normal rate and regular rhythm.   Pulmonary:      Effort: No respiratory distress.      Breath sounds: Wheezing present.   Abdominal:      General: There is no distension.      Tenderness: There is no guarding.   Musculoskeletal:         General: No swelling.   Skin:     Capillary Refill: Capillary refill takes less than 2 seconds.      Coloration: Skin is not jaundiced.   Neurological:      Comments: Not able to evaluation due to sleep          Fluids    Intake/Output Summary (Last 24 hours) at 4/8/2025 0955  Last data filed at 4/8/2025 0600  Gross per 24 hour   Intake 315.47 ml   Output 500 ml   Net -184.53 ml       Laboratory  Recent Labs     04/07/25  1456 04/08/25  0245   WBC 12.2* 4.3*   RBC 4.64 3.77*   HEMOGLOBIN 13.2 10.4*   HEMATOCRIT 40.8 33.9*   MCV 87.9 89.9   MCH 28.4 27.6   MCHC 32.4 30.7*   RDW 47.8 48.4   PLATELETCT 359 228   MPV 10.1 10.4     Recent Labs     04/07/25  1456 04/08/25  0245   SODIUM 140 139   POTASSIUM 4.1 4.1   CHLORIDE  102 113*   CO2 28 15*   GLUCOSE 108* 129*   BUN 20 14   CREATININE 0.82 0.50   CALCIUM 8.9 7.5*                   Imaging  DX-CHEST-PORTABLE (1 VIEW)   Final Result      No acute cardiopulmonary disease.           Assessment/Plan  Problem Representation:    * Status asthmaticus- (present on admission)  Assessment & Plan  Admitted with status asthmaticus.   After treatment, she has improved respiratory status, on room air.   -RT protocol  -Continue Duoneb   -Titrate oxygen to SpO2 93 to 95%.  -Continue methylprednisolone 62.5 every 8 hours for now.  Can transition to oral once stable.  -Continue nebulized budesonide twice daily.      Respiratory acidosis  Assessment & Plan  Related to asthma  Co2 15   -Start bicarb 650 mg oral qid       Muscle spasm  Assessment & Plan  Complains of intermittent muscle spasms.   Lower back.   Can use PRN cyclobenzaprine.     Leukocytosis  Assessment & Plan  Likely reactive. WBC is trending down on 4/8 morning.   -No indication for antibiotics.    Drug abuse (HCC)- (present on admission)  Assessment & Plan  History of recent drug use. Admits to using IV heroin and IV fentanyl.   Has withdrawal symptoms: restless, hypertension, tachycardia   -She is on precedex for withdrawal,   -Seizure precautions. Aspiration precautions.   -Clonidine and Subutex   -Will need OP support on discharge.         VTE prophylaxis: SCDs/TEDs and enoxaparin ppx    I have performed a physical exam and reviewed and updated ROS and Plan today (4/8/2025). In review of yesterday's note (4/7/2025), there are no changes except as documented above.

## 2025-04-08 NOTE — CONSULTS
Hospital Medicine Consultation    Date of Service  4/8/2025    Referring Physician  Spencer Fuentes M.D.    Consulting Physician  Elder Paredes M.D.    Reason for Consultation  Hospital medicine consultation requested the patient admitted with respiratory difficulty, status asthmaticus with a history of asthma, IV drug use    History of Presenting Illness  24 y.o. female who presented 4/7/2025 with severe dyspnea, the patient was apparently seen by urgent care and was given albuterol inhalers and steroids, despite that she became gradually worse, EMS was called the patient was found to be hypoxic, was given epinephrine, albuterol, Solu-Medrol, magnesium was transported to the ER, the patient in the ER was started on BiPAP with some improvement was given norepinephrine, the patient stated that she was compliant with her medication, she has frequent exacerbations approximately once a month she stated, she was intubated once in 2018 with asthma exacerbation.  The patient admitted to drug abuse including heroin, fentanyl.  The patient in the CSU level of care had improved, she required to have some medication for her narcotic withdrawal, the patient currently afebrile, heart rate in the 90s, respiration unlabored, blood pressures in the 120s over 70s, the patient is titrated off Precedex currently.    Review of Systems  Review of Systems   Constitutional:  Positive for malaise/fatigue. Negative for chills and fever.   HENT: Negative.     Eyes: Negative.    Respiratory:  Positive for cough, shortness of breath and wheezing.    Cardiovascular: Negative.  Negative for chest pain and palpitations.   Gastrointestinal: Negative.  Negative for heartburn, nausea and vomiting.   Genitourinary: Negative.  Negative for dysuria and frequency.   Musculoskeletal:  Positive for myalgias. Negative for back pain and neck pain.   Skin: Negative.  Negative for itching and rash.   Neurological: Negative.  Negative for dizziness, focal  weakness, weakness and headaches.   Endo/Heme/Allergies: Negative.  Negative for polydipsia. Does not bruise/bleed easily.   Psychiatric/Behavioral:  Positive for substance abuse. Negative for depression. The patient is nervous/anxious.        Past Medical History   has a past medical history of Allergy, Anxiety, ASTHMA (11/3/2011), Asthma, Pleurisy (2008), and Pneumonia (2008).  Polysubstance abuse    Surgical History  Denies recent surgical history    Family History  family history includes Cancer in her maternal grandfather; Diabetes in her father; Hypertension in her father; Other in her mother.    Social History   reports that she has quit smoking. Her smoking use included cigarettes. She has a 0.5 pack-year smoking history. She has never used smokeless tobacco. She reports current alcohol use. She reports current drug use. Drugs: Marijuana, Methamphetamines, Cocaine, Intravenous, and Inhaled.    Medications  Prior to Admission Medications   Prescriptions Last Dose Informant Patient Reported? Taking?   acetaminophen (TYLENOL) 325 MG Tab Unknown Historical No No   Sig: Take 3 Tablets by mouth every 6 hours as needed for Mild Pain.   albuterol (ACCUNEB) 0.63 MG/3ML nebulizer solution Unknown Historical No No   Sig: Take 3 mL by nebulization every four hours as needed for Shortness of Breath.   albuterol 108 (90 Base) MCG/ACT Aero Soln inhalation aerosol Unknown Historical No No   Sig: Inhale 2 Puffs every 6 hours as needed for Shortness of Breath.   clindamycin (CLEOCIN) 300 MG Cap Unknown Historical No No   Sig: Take 1 Capsule by mouth 3 times a day for 7 days.   fluticasone-salmeterol (ADVAIR) 250-50 MCG/ACT AEROSOL POWDER, BREATH ACTIVATED Unknown Historical No No   Sig: Inhale 1 Puff every 12 hours.   ibuprofen (MOTRIN) 400 MG Tab Unknown Historical No No   Sig: Take 1 Tablet by mouth every 6 hours as needed for Moderate Pain.      Facility-Administered Medications: None       Allergies  Allergies   Allergen  Reactions    Pcn [Penicillins] Hives    Pcn [Penicillins]     Pineapple        Physical Exam  Temp:  [35.6 °C (96 °F)-37.4 °C (99.4 °F)] 35.7 °C (96.2 °F)  Pulse:  [] 107  Resp:  [16-79] 16  BP: (101-149)/() 128/56  SpO2:  [90 %-100 %] 94 %    Physical Exam  Vitals and nursing note reviewed.   Constitutional:       Appearance: She is well-developed. She is ill-appearing. She is not diaphoretic.   HENT:      Head: Normocephalic and atraumatic.      Nose: Nose normal.   Eyes:      Conjunctiva/sclera: Conjunctivae normal.      Pupils: Pupils are equal, round, and reactive to light.   Neck:      Thyroid: No thyromegaly.      Vascular: No JVD.   Cardiovascular:      Rate and Rhythm: Normal rate and regular rhythm.      Heart sounds: Normal heart sounds.      No friction rub. No gallop.   Pulmonary:      Effort: Pulmonary effort is normal. No respiratory distress.      Breath sounds: Wheezing, rhonchi and rales present.   Abdominal:      General: Bowel sounds are normal. There is no distension.      Palpations: Abdomen is soft. There is no mass.      Tenderness: There is no abdominal tenderness. There is no guarding or rebound.   Musculoskeletal:         General: No tenderness. Normal range of motion.      Cervical back: Normal range of motion and neck supple.   Lymphadenopathy:      Cervical: No cervical adenopathy.   Skin:     General: Skin is warm and dry.      Comments: Multiple tattoos   Neurological:      Mental Status: She is alert and oriented to person, place, and time.      Cranial Nerves: No cranial nerve deficit.   Psychiatric:         Behavior: Behavior normal.         Fluids  Date 04/08/25 0700 - 04/09/25 0659   Shift 4529-0659 2242-4974 4967-9134 24 Hour Total   INTAKE   P.O. 260   260   Shift Total 260   260   OUTPUT   Shift Total       Weight (kg) 54 54 54 54       Laboratory  Recent Labs     04/07/25  1456 04/08/25  0245   WBC 12.2* 4.3*   RBC 4.64 3.77*   HEMOGLOBIN 13.2 10.4*   HEMATOCRIT  40.8 33.9*   MCV 87.9 89.9   MCH 28.4 27.6   MCHC 32.4 30.7*   RDW 47.8 48.4   PLATELETCT 359 228   MPV 10.1 10.4     Recent Labs     04/07/25  1456 04/08/25  0245   SODIUM 140 139   POTASSIUM 4.1 4.1   CHLORIDE 102 113*   CO2 28 15*   GLUCOSE 108* 129*   BUN 20 14   CREATININE 0.82 0.50   CALCIUM 8.9 7.5*                     Imaging  DX-CHEST-PORTABLE (1 VIEW)   Final Result      No acute cardiopulmonary disease.          Assessment/Plan  * Status asthmaticus- (present on admission)  Assessment & Plan  Admitted with status asthmaticus.   After treatment, she has improved respiratory status, on room air.   -RT protocol  -Continue Duoneb   -Titrate oxygen to SpO2 93 to 95%.  -Continue methylprednisolone 62.5 every 8 hours for now.  Can transition to oral once stable.  -Continue nebulized budesonide twice daily.    Respiratory acidosis  Assessment & Plan  Related to asthma  Improved, monitor    Muscle spasm  Assessment & Plan  Complains of intermittent muscle spasms.   Lower back.   Can use PRN cyclobenzaprine.     Leukocytosis  Assessment & Plan  Likely reactive. WBC is trending down on 4/8 morning.   -No indication for antibiotics.    Drug abuse (HCC)- (present on admission)  Assessment & Plan  History of recent drug use. Admits to using IV heroin and IV fentanyl.   Has withdrawal symptoms: restless, hypertension, tachycardia   -Seizure precautions. Aspiration precautions.   -Clonidine and Subutex   -Will need OP support on discharge.    Plan  4/8  The patient to continue on maximized respiratory therapy, DuoNeb, Pulmicort, prednisone  Ongoing close monitoring in terms of respiratory status, oxygenation  Continue Subutex and pain medication regimen to prevent withdrawal  See orders  Monitor closely  Patient is has a high medical complexity, complex decision making and is at high risk for complication, morbidity, and mortality.  I spent 61 minutes, reviewing the chart, obtaining and/or reviewing separately obtained  history. Performing a medically appropriate examination and evaluation.  Counseling and educating the patient. Ordering and reviewing medications, tests, or procedures.   Documenting clinical information in EPIC. Independently interpreting results and communicating results to patient. Discussing future disposition of care with patient, RN and case management.  Thank you for consulting with us, we will follow closely by the patient was hospitalized      Please note that this dictation was created using voice recognition software. I have made every reasonable attempt to correct obvious errors, but I expect that there are errors of grammar and possibly context that I did not discover before finalizing the note.

## 2025-04-08 NOTE — ASSESSMENT & PLAN NOTE
Admitted with status asthmaticus.   After treatment, she has improved respiratory status, on room air.   -RT protocol  -Continue Duoneb   -Titrate oxygen to SpO2 93 to 95%.  -Continue methylprednisolone 62.5 every 8 hours for now.  Now on prednisone  -Continue nebulized budesonide twice daily.

## 2025-04-08 NOTE — CARE PLAN
Problem: Bronchoconstriction  Goal: Improve in air movement and diminished wheezing  Description: Target End Date:  2 to 3 days1.  Implement inhaled treatments2.  Evaluate and manage medication effects  Outcome: Progressing   Duo Q4

## 2025-04-09 LAB
ALBUMIN SERPL BCP-MCNC: 3.9 G/DL (ref 3.2–4.9)
ALBUMIN/GLOB SERPL: 1.4 G/DL
ALP SERPL-CCNC: 89 U/L (ref 30–99)
ALT SERPL-CCNC: 19 U/L (ref 2–50)
ANION GAP SERPL CALC-SCNC: 12 MMOL/L (ref 7–16)
AST SERPL-CCNC: 19 U/L (ref 12–45)
BASOPHILS # BLD AUTO: 0.2 % (ref 0–1.8)
BASOPHILS # BLD: 0.03 K/UL (ref 0–0.12)
BILIRUB SERPL-MCNC: <0.2 MG/DL (ref 0.1–1.5)
BUN SERPL-MCNC: 20 MG/DL (ref 8–22)
CALCIUM ALBUM COR SERPL-MCNC: 9.3 MG/DL (ref 8.5–10.5)
CALCIUM SERPL-MCNC: 9.2 MG/DL (ref 8.5–10.5)
CHLORIDE SERPL-SCNC: 104 MMOL/L (ref 96–112)
CO2 SERPL-SCNC: 22 MMOL/L (ref 20–33)
CREAT SERPL-MCNC: 0.68 MG/DL (ref 0.5–1.4)
EOSINOPHIL # BLD AUTO: 0.01 K/UL (ref 0–0.51)
EOSINOPHIL NFR BLD: 0.1 % (ref 0–6.9)
ERYTHROCYTE [DISTWIDTH] IN BLOOD BY AUTOMATED COUNT: 50.6 FL (ref 35.9–50)
GFR SERPLBLD CREATININE-BSD FMLA CKD-EPI: 123 ML/MIN/1.73 M 2
GLOBULIN SER CALC-MCNC: 2.8 G/DL (ref 1.9–3.5)
GLUCOSE SERPL-MCNC: 152 MG/DL (ref 65–99)
HAV IGM SERPL QL IA: NORMAL
HBV CORE IGM SER QL: NORMAL
HBV SURFACE AG SER QL: NORMAL
HCT VFR BLD AUTO: 39.2 % (ref 37–47)
HCV AB SER QL: NORMAL
HGB BLD-MCNC: 12.6 G/DL (ref 12–16)
HIV 1+2 AB+HIV1 P24 AG SERPL QL IA: NORMAL
IMM GRANULOCYTES # BLD AUTO: 0.04 K/UL (ref 0–0.11)
IMM GRANULOCYTES NFR BLD AUTO: 0.3 % (ref 0–0.9)
LYMPHOCYTES # BLD AUTO: 1.07 K/UL (ref 1–4.8)
LYMPHOCYTES NFR BLD: 7.8 % (ref 22–41)
MAGNESIUM SERPL-MCNC: 2 MG/DL (ref 1.5–2.5)
MCH RBC QN AUTO: 28.5 PG (ref 27–33)
MCHC RBC AUTO-ENTMCNC: 32.1 G/DL (ref 32.2–35.5)
MCV RBC AUTO: 88.7 FL (ref 81.4–97.8)
MONOCYTES # BLD AUTO: 0.29 K/UL (ref 0–0.85)
MONOCYTES NFR BLD AUTO: 2.1 % (ref 0–13.4)
NEUTROPHILS # BLD AUTO: 12.27 K/UL (ref 1.82–7.42)
NEUTROPHILS NFR BLD: 89.5 % (ref 44–72)
NRBC # BLD AUTO: 0 K/UL
NRBC BLD-RTO: 0 /100 WBC (ref 0–0.2)
PHOSPHATE SERPL-MCNC: 2.6 MG/DL (ref 2.5–4.5)
PLATELET # BLD AUTO: 313 K/UL (ref 164–446)
PMV BLD AUTO: 10.4 FL (ref 9–12.9)
POTASSIUM SERPL-SCNC: 4.6 MMOL/L (ref 3.6–5.5)
PROT SERPL-MCNC: 6.7 G/DL (ref 6–8.2)
RBC # BLD AUTO: 4.42 M/UL (ref 4.2–5.4)
SODIUM SERPL-SCNC: 138 MMOL/L (ref 135–145)
WBC # BLD AUTO: 13.7 K/UL (ref 4.8–10.8)

## 2025-04-09 PROCEDURE — A9270 NON-COVERED ITEM OR SERVICE: HCPCS | Performed by: STUDENT IN AN ORGANIZED HEALTH CARE EDUCATION/TRAINING PROGRAM

## 2025-04-09 PROCEDURE — 700102 HCHG RX REV CODE 250 W/ 637 OVERRIDE(OP): Performed by: STUDENT IN AN ORGANIZED HEALTH CARE EDUCATION/TRAINING PROGRAM

## 2025-04-09 PROCEDURE — A9270 NON-COVERED ITEM OR SERVICE: HCPCS | Performed by: EMERGENCY MEDICINE

## 2025-04-09 PROCEDURE — 700102 HCHG RX REV CODE 250 W/ 637 OVERRIDE(OP): Performed by: EMERGENCY MEDICINE

## 2025-04-09 PROCEDURE — 700111 HCHG RX REV CODE 636 W/ 250 OVERRIDE (IP): Performed by: EMERGENCY MEDICINE

## 2025-04-09 PROCEDURE — A9270 NON-COVERED ITEM OR SERVICE: HCPCS | Performed by: NURSE PRACTITIONER

## 2025-04-09 PROCEDURE — 700102 HCHG RX REV CODE 250 W/ 637 OVERRIDE(OP): Performed by: INTERNAL MEDICINE

## 2025-04-09 PROCEDURE — 700102 HCHG RX REV CODE 250 W/ 637 OVERRIDE(OP): Performed by: HOSPITALIST

## 2025-04-09 PROCEDURE — 87389 HIV-1 AG W/HIV-1&-2 AB AG IA: CPT

## 2025-04-09 PROCEDURE — 83735 ASSAY OF MAGNESIUM: CPT

## 2025-04-09 PROCEDURE — 85025 COMPLETE CBC W/AUTO DIFF WBC: CPT

## 2025-04-09 PROCEDURE — 80074 ACUTE HEPATITIS PANEL: CPT

## 2025-04-09 PROCEDURE — A9270 NON-COVERED ITEM OR SERVICE: HCPCS | Performed by: HOSPITALIST

## 2025-04-09 PROCEDURE — 99233 SBSQ HOSP IP/OBS HIGH 50: CPT | Performed by: STUDENT IN AN ORGANIZED HEALTH CARE EDUCATION/TRAINING PROGRAM

## 2025-04-09 PROCEDURE — 84100 ASSAY OF PHOSPHORUS: CPT

## 2025-04-09 PROCEDURE — 700101 HCHG RX REV CODE 250: Performed by: INTERNAL MEDICINE

## 2025-04-09 PROCEDURE — 80053 COMPREHEN METABOLIC PANEL: CPT

## 2025-04-09 PROCEDURE — 94760 N-INVAS EAR/PLS OXIMETRY 1: CPT

## 2025-04-09 PROCEDURE — 94640 AIRWAY INHALATION TREATMENT: CPT

## 2025-04-09 PROCEDURE — 770006 HCHG ROOM/CARE - MED/SURG/GYN SEMI*

## 2025-04-09 PROCEDURE — 700101 HCHG RX REV CODE 250: Performed by: STUDENT IN AN ORGANIZED HEALTH CARE EDUCATION/TRAINING PROGRAM

## 2025-04-09 PROCEDURE — 700102 HCHG RX REV CODE 250 W/ 637 OVERRIDE(OP): Performed by: NURSE PRACTITIONER

## 2025-04-09 PROCEDURE — 700111 HCHG RX REV CODE 636 W/ 250 OVERRIDE (IP): Mod: JZ | Performed by: INTERNAL MEDICINE

## 2025-04-09 PROCEDURE — A9270 NON-COVERED ITEM OR SERVICE: HCPCS | Performed by: INTERNAL MEDICINE

## 2025-04-09 RX ORDER — HYDROXYZINE HYDROCHLORIDE 25 MG/1
25 TABLET, FILM COATED ORAL 3 TIMES DAILY PRN
Status: DISCONTINUED | OUTPATIENT
Start: 2025-04-09 | End: 2025-04-10 | Stop reason: HOSPADM

## 2025-04-09 RX ORDER — LIDOCAINE 4 G/G
1 PATCH TOPICAL DAILY
Status: DISCONTINUED | OUTPATIENT
Start: 2025-04-09 | End: 2025-04-10 | Stop reason: HOSPADM

## 2025-04-09 RX ADMIN — BENZONATATE 100 MG: 100 CAPSULE ORAL at 10:05

## 2025-04-09 RX ADMIN — IPRATROPIUM BROMIDE AND ALBUTEROL SULFATE 3 ML: .5; 2.5 SOLUTION RESPIRATORY (INHALATION) at 03:56

## 2025-04-09 RX ADMIN — IPRATROPIUM BROMIDE AND ALBUTEROL SULFATE 3 ML: .5; 2.5 SOLUTION RESPIRATORY (INHALATION) at 13:45

## 2025-04-09 RX ADMIN — IPRATROPIUM BROMIDE AND ALBUTEROL SULFATE 3 ML: .5; 2.5 SOLUTION RESPIRATORY (INHALATION) at 06:18

## 2025-04-09 RX ADMIN — BENZONATATE 100 MG: 100 CAPSULE ORAL at 19:25

## 2025-04-09 RX ADMIN — OXYCODONE HYDROCHLORIDE 5 MG: 5 TABLET ORAL at 19:23

## 2025-04-09 RX ADMIN — IPRATROPIUM BROMIDE AND ALBUTEROL SULFATE 3 ML: .5; 2.5 SOLUTION RESPIRATORY (INHALATION) at 10:08

## 2025-04-09 RX ADMIN — CLONIDINE HYDROCHLORIDE 0.2 MG: 0.1 TABLET ORAL at 14:03

## 2025-04-09 RX ADMIN — DIAZEPAM 5 MG: 5 TABLET ORAL at 00:01

## 2025-04-09 RX ADMIN — IPRATROPIUM BROMIDE AND ALBUTEROL SULFATE 3 ML: .5; 2.5 SOLUTION RESPIRATORY (INHALATION) at 23:53

## 2025-04-09 RX ADMIN — BUDESONIDE 0.25 MG: 0.25 SUSPENSION RESPIRATORY (INHALATION) at 06:18

## 2025-04-09 RX ADMIN — ENOXAPARIN SODIUM 40 MG: 100 INJECTION SUBCUTANEOUS at 16:15

## 2025-04-09 RX ADMIN — LIDOCAINE PAIN RELIEF 1 PATCH: 560 PATCH TOPICAL at 16:15

## 2025-04-09 RX ADMIN — CYCLOBENZAPRINE 10 MG: 10 TABLET, FILM COATED ORAL at 20:51

## 2025-04-09 RX ADMIN — CYCLOBENZAPRINE 10 MG: 10 TABLET, FILM COATED ORAL at 00:01

## 2025-04-09 RX ADMIN — HYDROXYZINE HYDROCHLORIDE 25 MG: 25 TABLET, FILM COATED ORAL at 20:49

## 2025-04-09 RX ADMIN — CYCLOBENZAPRINE 10 MG: 10 TABLET, FILM COATED ORAL at 08:39

## 2025-04-09 RX ADMIN — CLONIDINE HYDROCHLORIDE 0.2 MG: 0.1 TABLET ORAL at 23:13

## 2025-04-09 RX ADMIN — CLONIDINE HYDROCHLORIDE 0.2 MG: 0.1 TABLET ORAL at 05:21

## 2025-04-09 RX ADMIN — PREDNISONE 40 MG: 20 TABLET ORAL at 05:21

## 2025-04-09 RX ADMIN — OXYCODONE HYDROCHLORIDE 5 MG: 5 TABLET ORAL at 08:39

## 2025-04-09 ASSESSMENT — PAIN DESCRIPTION - PAIN TYPE
TYPE: ACUTE PAIN

## 2025-04-09 ASSESSMENT — COGNITIVE AND FUNCTIONAL STATUS - GENERAL
DAILY ACTIVITIY SCORE: 24
SUGGESTED CMS G CODE MODIFIER DAILY ACTIVITY: CH
CLIMB 3 TO 5 STEPS WITH RAILING: A LITTLE
SUGGESTED CMS G CODE MODIFIER MOBILITY: CJ
MOBILITY SCORE: 22
WALKING IN HOSPITAL ROOM: A LITTLE

## 2025-04-09 ASSESSMENT — ENCOUNTER SYMPTOMS
BACK PAIN: 1
WEAKNESS: 1
SHORTNESS OF BREATH: 1
MYALGIAS: 1

## 2025-04-09 NOTE — DISCHARGE PLANNING
RN CM received a fax from [facility] [employer]. Form given to Pt, per Pt, her neighbor is taking care of her pert so she does not need to fill out the form.

## 2025-04-09 NOTE — CARE PLAN
The patient is Stable - Low risk of patient condition declining or worsening    Shift Goals  Clinical Goals: Monitor O2 sats, Safety  Patient Goals: Rest  Family Goals: BHARATHI    Progress made toward(s) clinical / shift goals:  Patient is A&Ox4. Educated patient on POC. Medicated patient per MAR for muscle spasms and anxiety. Patient remains on 2L NC. Educated patient on use of call light for needs. Bed is low and locked. Bed alarm on. Call light within reach. Hourly rounding continues.      Problem: Knowledge Deficit - Standard  Goal: Patient and family/care givers will demonstrate understanding of plan of care, disease process/condition, diagnostic tests and medications  Outcome: Progressing     Problem: Pain - Standard  Goal: Alleviation of pain or a reduction in pain to the patient’s comfort goal  Outcome: Progressing     Problem: Fall Risk  Goal: Patient will remain free from falls  Outcome: Progressing       Patient is not progressing towards the following goals:

## 2025-04-09 NOTE — PROGRESS NOTES
4 Eyes Skin Assessment Completed by IAIN Lal and IAIN Allen.    Head WDL  Ears WDL  Nose WDL  Mouth WDL  Neck WDL  Breast/Chest WDL  Shoulder Blades WDL  Spine WDL  (R) Arm/Elbow/Hand Redness, Blanching, and Bruising  (L) Arm/Elbow/Hand Redness, Blanching, and Bruising  Abdomen Scab  Groin WDL  Scrotum/Coccyx/Buttocks Blanching  (R) Leg Bruising  (L) Leg Bruising  (R) Heel/Foot/Toe Redness and Blanching, fungal toenails  (L) Heel/Foot/Toe Redness and Blanching, fungal toenails          Devices In Places Pulse Ox      Interventions In Place Gray Ear Foams, NC W/Ear Foams, and Pillows    Possible Skin Injury No    Pictures Uploaded Into Epic N/A  Wound Consult Placed N/A  RN Wound Prevention Protocol Ordered No

## 2025-04-09 NOTE — CARE PLAN
Problem: Bronchoconstriction  Goal: Improve in air movement and diminished wheezing  Description: Target End Date:  2 to 3 days1.  Implement inhaled treatments2.  Evaluate and manage medication effects  Outcome: Progressing

## 2025-04-09 NOTE — PROGRESS NOTES
[facility] Medicine Daily Progress Note    Date of Service  4/9/2025    Chief Complaint  Alecia Keller is a 24 y.o. female admitted 4/7/2025 with sob    Hospital Course  24 y.o. female who presented 4/7/2025 with severe dyspnea. The patient was apparently seen by urgent care and was given albuterol inhalers and steroids, despite that she became gradually worse, EMS was called the patient was found to be hypoxic, was given epinephrine, albuterol, Solu-Medrol, magnesium was transported to the ER, The patient in the ER was started on BiPAP with some improvement was given norepinephrine, the patient stated that she was compliant with her medication, she has frequent exacerbations approximately once a month she stated, she was intubated once in 2018 with asthma exacerbation.  The patient admitted to drug abuse including heroin, fentanyl.  The patient in the CSU level of care had improved, she required to have some medication for her narcotic withdrawal, the patient currently afebrile, heart rate in the 90s, respiration unlabored, blood pressures in the 120s over 70s, the patient is titrated off Precedex currently.       Interval Problem Update  -Notes were extensively reviewed from primary team, radiology, ED, surgery, specialists, etc.   -Reviewed labs to date, microbiology for current admit and prior  -Imaging was independently reviewed and interpreted    Seen patient at bedside  She is extubated. On 2L O2. Not on home oxygen at baseline  Still wheezing, continue pulmicort and duoneb  Patient is very lethargic. Awaking with conversation, however back to sleep immediately  Reports back pain  Reviewed PDMP, Patient was taking Suboxone till 9/2024, unclear if she is still on it. Will verify with patient  Discussed with pharmacy, will continue oxycodone prn    I have discussed this patient's plan of care and discharge plan at IDT rounds today with Case Management, Nursing, Nursing leadership, and other members of  the IDT team.    Consultants/Specialty  critical care    Code Status  Full Code    Disposition  The patient is not medically cleared for discharge to home or a post-acute facility.      I have placed the appropriate orders for post-discharge needs.    Review of Systems  Review of Systems   Constitutional:  Positive for malaise/fatigue.   Respiratory:  Positive for shortness of breath.    Musculoskeletal:  Positive for back pain, joint pain and myalgias.   Neurological:  Positive for weakness.   All other systems reviewed and are negative.       Physical Exam  Temp:  [36.6 °C (97.9 °F)-36.9 °C (98.4 °F)] 36.8 °C (98.2 °F)  Pulse:  [] 107  Resp:  [16-20] 17  BP: ()/(47-73) 107/64  SpO2:  [93 %-100 %] 96 %    Physical Exam  Vitals and nursing note reviewed.   Constitutional:       Appearance: Normal appearance. She is ill-appearing.   HENT:      Head: Normocephalic and atraumatic.      Nose: Nose normal.      Mouth/Throat:      Pharynx: Oropharynx is clear.   Eyes:      Extraocular Movements: Extraocular movements intact.      Conjunctiva/sclera: Conjunctivae normal.      Pupils: Pupils are equal, round, and reactive to light.   Cardiovascular:      Rate and Rhythm: Normal rate and regular rhythm.      Pulses: Normal pulses.      Heart sounds: Normal heart sounds.   Pulmonary:      Effort: Pulmonary effort is normal.      Breath sounds: Rhonchi present.   Abdominal:      General: Abdomen is flat. Bowel sounds are normal.      Palpations: Abdomen is soft.   Musculoskeletal:         General: Normal range of motion.      Cervical back: Normal range of motion and neck supple.   Skin:     General: Skin is warm and dry.   Neurological:      General: No focal deficit present.      Mental Status: She is alert and oriented to person, place, and time.      Comments: lethargic   Psychiatric:         Mood and Affect: Mood normal.         Behavior: Behavior normal.         Fluids  No intake or output data in the 24  hours ending 04/09/25 1442     Laboratory  Recent Labs     04/07/25  1456 04/08/25  0245 04/09/25  1015   WBC 12.2* 4.3* 13.7*   RBC 4.64 3.77* 4.42   HEMOGLOBIN 13.2 10.4* 12.6   HEMATOCRIT 40.8 33.9* 39.2   MCV 87.9 89.9 88.7   MCH 28.4 27.6 28.5   MCHC 32.4 30.7* 32.1*   RDW 47.8 48.4 50.6*   PLATELETCT 359 228 313   MPV 10.1 10.4 10.4     Recent Labs     04/07/25  1456 04/08/25  0245 04/09/25  1014   SODIUM 140 139 138   POTASSIUM 4.1 4.1 4.6   CHLORIDE 102 113* 104   CO2 28 15* 22   GLUCOSE 108* 129* 152*   BUN 20 14 20   CREATININE 0.82 0.50 0.68   CALCIUM 8.9 7.5* 9.2                   Imaging  DX-CHEST-PORTABLE (1 VIEW)   Final Result      No acute cardiopulmonary disease.           Assessment/Plan  * Status asthmaticus- (present on admission)  Assessment & Plan  Admitted with status asthmaticus.   After treatment, she has improved respiratory status, on room air.   -RT protocol  -Continue Duoneb   -Titrate oxygen to SpO2 93 to 95%.  -Continue methylprednisolone 62.5 every 8 hours for now.  Can transition to oral once stable.  -Continue nebulized budesonide twice daily.    Respiratory acidosis  Assessment & Plan  Related to asthma  Improved, monitor    Muscle spasm  Assessment & Plan  Complains of intermittent muscle spasms.   Lower back.   Can use PRN cyclobenzaprine.     Leukocytosis  Assessment & Plan  Likely reactive. WBC is trending down on 4/8 morning.   -No indication for antibiotics.    Drug abuse (HCC)- (present on admission)  Assessment & Plan  History of recent drug use. Admits to using IV heroin and IV fentanyl.   Has withdrawal symptoms: restless, hypertension, tachycardia   -Seizure precautions. Aspiration precautions.   -Clonidine and Subutex   -Will need OP support on discharge.         VTE prophylaxis: lovenox    I have performed a physical exam and reviewed and updated ROS and Plan today (4/9/2025). In review of yesterday's note (4/8/2025), there are no changes except as documented  above.    Patient is has a high medical complexity, complex decision making and is at high risk for complication, morbidity, and mortality.  I spent 51 minutes, reviewing the chart, obtaining and/or reviewing separately obtained history. Performing a medically appropriate examination and evaluation.  Counseling and educating the patient. Ordering and reviewing medications, tests, or procedures.  Discussing the case with pharmacy.  Documenting clinical information in EPIC. Independently interpreting results and communicating results to patient. Discussing future disposition of care with patient, RN and case management.  This does not include time spent on separately billable procedures/tests.

## 2025-04-09 NOTE — CARE PLAN
The patient is Stable - Low risk of patient condition declining or worsening    Shift Goals  Clinical Goals: Monitor respiratory status, safety, pain management  Patient Goals: Pain management  Family Goals: BHARATHI    Progress made toward(s) clinical / shift goals:      Problem: Respiratory:  Goal: Respiratory status will improve  Outcome: Progressing   Patient on continuous pulse ox to monitor SpO2 level throughout the shift. Patient SpO2 levels ranges between 94-96 % and patient's respiratory rate ranges between 17-18. WOB within normal limits. Supplemental oxygen available at bedside if needed.      Problem: Pain - Standard  Goal: Alleviation of pain or a reduction in pain to the patient’s comfort goal  Outcome: Progressing   Frequent pain assessments throughout shift. PRN pain medications provided per MAR and pt request. Pharmacological and non-pharmacological interventions utilized.      Patient is not progressing towards the following goals:

## 2025-04-10 VITALS
BODY MASS INDEX: 20.23 KG/M2 | SYSTOLIC BLOOD PRESSURE: 103 MMHG | DIASTOLIC BLOOD PRESSURE: 65 MMHG | TEMPERATURE: 98.8 F | OXYGEN SATURATION: 93 % | RESPIRATION RATE: 18 BRPM | WEIGHT: 125.88 LBS | HEIGHT: 66 IN | HEART RATE: 96 BPM

## 2025-04-10 LAB
ANION GAP SERPL CALC-SCNC: 14 MMOL/L (ref 7–16)
BUN SERPL-MCNC: 24 MG/DL (ref 8–22)
CALCIUM SERPL-MCNC: 8.9 MG/DL (ref 8.5–10.5)
CHLORIDE SERPL-SCNC: 103 MMOL/L (ref 96–112)
CO2 SERPL-SCNC: 23 MMOL/L (ref 20–33)
CREAT SERPL-MCNC: 0.76 MG/DL (ref 0.5–1.4)
ERYTHROCYTE [DISTWIDTH] IN BLOOD BY AUTOMATED COUNT: 50.2 FL (ref 35.9–50)
GFR SERPLBLD CREATININE-BSD FMLA CKD-EPI: 111 ML/MIN/1.73 M 2
GLUCOSE SERPL-MCNC: 91 MG/DL (ref 65–99)
HCT VFR BLD AUTO: 43.1 % (ref 37–47)
HGB BLD-MCNC: 14.1 G/DL (ref 12–16)
MCH RBC QN AUTO: 28.7 PG (ref 27–33)
MCHC RBC AUTO-ENTMCNC: 32.7 G/DL (ref 32.2–35.5)
MCV RBC AUTO: 87.6 FL (ref 81.4–97.8)
PLATELET # BLD AUTO: 343 K/UL (ref 164–446)
PMV BLD AUTO: 10.4 FL (ref 9–12.9)
POTASSIUM SERPL-SCNC: 4 MMOL/L (ref 3.6–5.5)
RBC # BLD AUTO: 4.92 M/UL (ref 4.2–5.4)
SODIUM SERPL-SCNC: 140 MMOL/L (ref 135–145)
WBC # BLD AUTO: 10.9 K/UL (ref 4.8–10.8)

## 2025-04-10 PROCEDURE — 700102 HCHG RX REV CODE 250 W/ 637 OVERRIDE(OP): Performed by: HOSPITALIST

## 2025-04-10 PROCEDURE — 700101 HCHG RX REV CODE 250: Performed by: INTERNAL MEDICINE

## 2025-04-10 PROCEDURE — 85027 COMPLETE CBC AUTOMATED: CPT

## 2025-04-10 PROCEDURE — 94640 AIRWAY INHALATION TREATMENT: CPT

## 2025-04-10 PROCEDURE — 99239 HOSP IP/OBS DSCHRG MGMT >30: CPT | Performed by: STUDENT IN AN ORGANIZED HEALTH CARE EDUCATION/TRAINING PROGRAM

## 2025-04-10 PROCEDURE — 700101 HCHG RX REV CODE 250: Performed by: STUDENT IN AN ORGANIZED HEALTH CARE EDUCATION/TRAINING PROGRAM

## 2025-04-10 PROCEDURE — 700111 HCHG RX REV CODE 636 W/ 250 OVERRIDE (IP): Performed by: EMERGENCY MEDICINE

## 2025-04-10 PROCEDURE — 80048 BASIC METABOLIC PNL TOTAL CA: CPT

## 2025-04-10 PROCEDURE — A9270 NON-COVERED ITEM OR SERVICE: HCPCS | Performed by: HOSPITALIST

## 2025-04-10 RX ORDER — ALBUTEROL SULFATE 5 MG/ML
2.5 SOLUTION RESPIRATORY (INHALATION)
Status: DISCONTINUED | OUTPATIENT
Start: 2025-04-10 | End: 2025-04-10 | Stop reason: HOSPADM

## 2025-04-10 RX ORDER — ALBUTEROL SULFATE 90 UG/1
2 INHALANT RESPIRATORY (INHALATION) EVERY 6 HOURS PRN
Qty: 8.5 G | Refills: 0 | Status: SHIPPED | OUTPATIENT
Start: 2025-04-10

## 2025-04-10 RX ORDER — PREDNISONE 20 MG/1
40 TABLET ORAL DAILY
Qty: 10 TABLET | Refills: 0 | Status: SHIPPED | OUTPATIENT
Start: 2025-04-11 | End: 2025-04-16

## 2025-04-10 RX ORDER — BENZONATATE 100 MG/1
100 CAPSULE ORAL 3 TIMES DAILY PRN
Qty: 60 CAPSULE | Refills: 0 | Status: SHIPPED | OUTPATIENT
Start: 2025-04-10

## 2025-04-10 RX ORDER — IPRATROPIUM BROMIDE AND ALBUTEROL SULFATE 2.5; .5 MG/3ML; MG/3ML
3 SOLUTION RESPIRATORY (INHALATION)
Status: DISCONTINUED | OUTPATIENT
Start: 2025-04-10 | End: 2025-04-10 | Stop reason: HOSPADM

## 2025-04-10 RX ORDER — OXYCODONE HYDROCHLORIDE 5 MG/1
5 TABLET ORAL EVERY 8 HOURS PRN
Qty: 8 TABLET | Refills: 0 | Status: SHIPPED | OUTPATIENT
Start: 2025-04-10 | End: 2025-04-13

## 2025-04-10 RX ADMIN — PREDNISONE 40 MG: 20 TABLET ORAL at 04:58

## 2025-04-10 RX ADMIN — OXYCODONE HYDROCHLORIDE 5 MG: 5 TABLET ORAL at 09:23

## 2025-04-10 RX ADMIN — IPRATROPIUM BROMIDE AND ALBUTEROL SULFATE 3 ML: .5; 2.5 SOLUTION RESPIRATORY (INHALATION) at 10:33

## 2025-04-10 RX ADMIN — IPRATROPIUM BROMIDE AND ALBUTEROL SULFATE 3 ML: .5; 2.5 SOLUTION RESPIRATORY (INHALATION) at 07:04

## 2025-04-10 RX ADMIN — BUDESONIDE 0.25 MG: 0.25 SUSPENSION RESPIRATORY (INHALATION) at 07:04

## 2025-04-10 ASSESSMENT — PAIN DESCRIPTION - PAIN TYPE: TYPE: CHRONIC PAIN

## 2025-04-10 NOTE — CARE PLAN
The patient is Stable - Low risk of patient condition declining or worsening    Shift Goals  Clinical Goals: Pain control, Stable respiratory status  Patient Goals: Pain control, Sleep  Family Goals: BHARATHI    Progress made toward(s) clinical / shift goals:  Patient is A&Ox4. Educated patient on POC. Medicated patient per MAR for pain. Patient up for shower. Patient remained on room air throughout shift. Bed is low and locked. Bed alarm on. Call light within reach. Hourly rounding continues.      Problem: Pain - Standard  Goal: Alleviation of pain or a reduction in pain to the patient’s comfort goal  Outcome: Progressing     Problem: Fall Risk  Goal: Patient will remain free from falls  Outcome: Progressing     Problem: Knowledge Deficit - Standard  Goal: Patient and family/care givers will demonstrate understanding of plan of care, disease process/condition, diagnostic tests and medications  Outcome: Progressing       Patient is not progressing towards the following goals:

## 2025-04-10 NOTE — PROGRESS NOTES
Discharge orders received.  Patient arrived to the discharge lounge.  PIV removed.  Instructions given, medications reviewed and general discharge education provided to patient.  Follow up appointments discussed.  Patient verbalized understanding of dc instructions and prescriptions.  Patient signed discharge instructions.  Patient verbalized understanding had all belongings with her.  Patient did not want medication and left without. Notified pharmacy.  Wished patient a speedy recovery.

## 2025-04-11 NOTE — DISCHARGE SUMMARY
Discharge Summary    CHIEF COMPLAINT ON ADMISSION  Chief Complaint   Patient presents with    Shortness of Breath     BIB EMS for asthma exacerbation.  Pt. Received IM epi, IV epi, solumedrol, 1 albuterol, 2 duo neb tx, and magnesium en route.  Pt. Was unable to tolerate CPAP en route.  Pt.  Reports SOB started earlier today.          Reason for Admission  EMS     Admission Date  4/7/2025    CODE STATUS  Prior    HPI & HOSPITAL COURSE  This is a 24 y.o. female with history of asthma, drug abuse, who presented 4/7/2025 with severe dyspnea. The patient was apparently seen by urgent care and was given albuterol inhalers and steroids, despite that she became gradually worse, EMS was called the patient was found to be hypoxic, was given epinephrine, albuterol, Solu-Medrol, magnesium was transported to the ER.     In the ER, the patient was started on BiPAP and norepinephrine with some improvement. Patient endorses compliance with her medications. She states that she was intubated once in 2018 with asthma exacerbation.     Patient was admitted to ICU level of care secondary to acute asthma exacerbation.  She was given breathing treatments and iv steroid.  Her respiratory status has been improved significantly.  The patient has been off BiPAP and NC oxygen.  She is currently on room air.  No wheezing appreciated.  She is discharged with maintenance inhaler Advair with albuterol as needed and steroid as well.  I have placed pulmonary referral.  Patient is advised to follow-up with pulmonary as outpatient.  She voiced understanding.    Patient has history of drug abuse.  She admits to use IV heroin and IV fentanyl.  Patient was started with Precedex drip while she was in ICU for anticipated withdrawal.  Counseling provided.  On the day of discharge, patient is hemodynamically stable.  No sign of withdrawal.  She follows with [facility] for pain management. Per patient she is on Suboxone. I have advised patient to continue  follow-up with [facility] for [facility] managements.     Therefore, she is discharged in good and stable condition to home with close outpatient follow-up.    The patient met 2-midnight criteria for an inpatient stay at the time of discharge.    Discharge Date  4/10/2025    FOLLOW UP ITEMS POST DISCHARGE  PCP  Pain managements  Pulmonary     DISCHARGE DIAGNOSES  Principal Problem:    Status asthmaticus (POA: Yes)  Active Problems:    Drug abuse (HCC) (POA: Yes)      Overview: Alcohol, occasional Methamphetamine,     Leukocytosis (POA: Unknown)    Muscle spasm (POA: Unknown)    Respiratory acidosis (POA: Unknown)  Resolved Problems:    * No resolved hospital problems. *      FOLLOW UP  No future appointments.  [facility] - Primary [facility]  [address] [facility] [address]  [phone]  Call in 1 week(s)      [facility] - [facility]  [address] [facility] [address]  [phone]  Call in 1 week(s)        MEDICATIONS ON DISCHARGE     Medication List        START taking these medications        Instructions   benzonatate 100 MG Caps  Commonly known as: Tessalon   Take 1 Capsule by mouth 3 times a day as needed for Cough.  Dose: 100 mg     oxyCODONE immediate-release 5 MG Tabs  Commonly known as: Roxicodone   Take 1 Tablet by mouth every 8 hours as needed for Severe Pain for up to 3 days.  Dose: 5 mg     predniSONE 20 MG Tabs  Start taking on: April 11, 2025  Commonly known as: Deltasone   Take 2 Tablets by mouth every day for 5 days.  Dose: 40 mg            CONTINUE taking these medications        Instructions   acetaminophen 325 MG Tabs  Commonly known as: Tylenol   Take 3 Tablets by mouth every 6 hours as needed for Mild Pain.  Dose: 975 mg     * albuterol 0.63 MG/3ML nebulizer solution  Commonly known as: Accuneb   Take 3 mL by nebulization every four hours as needed for Shortness of Breath.  Dose: 0.63 mg     * albuterol 108 (90 Base) MCG/ACT Aers inhalation  aerosol   Inhale 2 Puffs every 6 hours as needed for Shortness of Breath.  Dose: 2 Puff     fluticasone-salmeterol 250-50 MCG/ACT Aepb  Commonly known as: Advair   Inhale 1 Puff every 12 hours.  Dose: 1 Puff     ibuprofen 400 MG Tabs  Commonly known as: Motrin   Take 1 Tablet by mouth every 6 hours as needed for Moderate Pain.  Dose: 400 mg           * This list has 2 medication(s) that are the same as other medications prescribed for you. Read the directions carefully, and ask your doctor or other care provider to review them with you.                STOP taking these medications      clindamycin 300 MG Caps  Commonly known as: Cleocin              Allergies  Allergies   Allergen Reactions    Pcn [Penicillins] Hives    Pcn [Penicillins]     Pineapple        DIET  No orders of the defined types were placed in this encounter.      ACTIVITY  As tolerated.  Weight bearing as tolerated    CONSULTATIONS  ICU    PROCEDURES  na    LABORATORY  Lab Results   Component Value Date    SODIUM 140 04/10/2025    POTASSIUM 4.0 04/10/2025    CHLORIDE 103 04/10/2025    CO2 23 04/10/2025    GLUCOSE 91 04/10/2025    BUN 24 (H) 04/10/2025    CREATININE 0.76 04/10/2025        Lab Results   Component Value Date    WBC 10.9 (H) 04/10/2025    HEMOGLOBIN 14.1 04/10/2025    HEMATOCRIT 43.1 04/10/2025    PLATELETCT 343 04/10/2025      DX-CHEST-PORTABLE (1 VIEW)   Final Result      No acute cardiopulmonary disease.          Total time of the discharge process 33 minutes.

## 2025-04-29 ENCOUNTER — APPOINTMENT (OUTPATIENT)
Dept: RADIOLOGY | Facility: MEDICAL CENTER | Age: 25
End: 2025-04-29
Payer: COMMERCIAL

## 2025-04-29 ENCOUNTER — HOSPITAL ENCOUNTER (EMERGENCY)
Facility: MEDICAL CENTER | Age: 25
End: 2025-04-29
Attending: STUDENT IN AN ORGANIZED HEALTH CARE EDUCATION/TRAINING PROGRAM
Payer: COMMERCIAL

## 2025-04-29 ENCOUNTER — APPOINTMENT (OUTPATIENT)
Dept: RADIOLOGY | Facility: MEDICAL CENTER | Age: 25
End: 2025-04-29
Attending: STUDENT IN AN ORGANIZED HEALTH CARE EDUCATION/TRAINING PROGRAM
Payer: COMMERCIAL

## 2025-04-29 VITALS
OXYGEN SATURATION: 97 % | HEIGHT: 66 IN | TEMPERATURE: 97.8 F | HEART RATE: 91 BPM | SYSTOLIC BLOOD PRESSURE: 124 MMHG | RESPIRATION RATE: 18 BRPM | WEIGHT: 125.88 LBS | DIASTOLIC BLOOD PRESSURE: 59 MMHG | BODY MASS INDEX: 20.23 KG/M2

## 2025-04-29 DIAGNOSIS — J45.901 MODERATE ASTHMA WITH ACUTE EXACERBATION, UNSPECIFIED WHETHER PERSISTENT: ICD-10-CM

## 2025-04-29 LAB
ALBUMIN SERPL BCP-MCNC: 3.6 G/DL (ref 3.2–4.9)
ALBUMIN/GLOB SERPL: 0.9 G/DL
ALP SERPL-CCNC: 104 U/L (ref 30–99)
ALT SERPL-CCNC: 12 U/L (ref 2–50)
ANION GAP SERPL CALC-SCNC: 8 MMOL/L (ref 7–16)
AST SERPL-CCNC: 21 U/L (ref 12–45)
BACTERIA BLD CULT: NORMAL
BASOPHILS # BLD AUTO: 0.4 % (ref 0–1.8)
BASOPHILS # BLD: 0.06 K/UL (ref 0–0.12)
BILIRUB SERPL-MCNC: 0.2 MG/DL (ref 0.1–1.5)
BUN SERPL-MCNC: 7 MG/DL (ref 8–22)
CALCIUM ALBUM COR SERPL-MCNC: 9.1 MG/DL (ref 8.5–10.5)
CALCIUM SERPL-MCNC: 8.8 MG/DL (ref 8.5–10.5)
CHLORIDE SERPL-SCNC: 103 MMOL/L (ref 96–112)
CO2 SERPL-SCNC: 28 MMOL/L (ref 20–33)
CREAT SERPL-MCNC: 0.75 MG/DL (ref 0.5–1.4)
EOSINOPHIL # BLD AUTO: 1.03 K/UL (ref 0–0.51)
EOSINOPHIL NFR BLD: 6.7 % (ref 0–6.9)
ERYTHROCYTE [DISTWIDTH] IN BLOOD BY AUTOMATED COUNT: 45.9 FL (ref 35.9–50)
GFR SERPLBLD CREATININE-BSD FMLA CKD-EPI: 113 ML/MIN/1.73 M 2
GLOBULIN SER CALC-MCNC: 3.8 G/DL (ref 1.9–3.5)
GLUCOSE SERPL-MCNC: 97 MG/DL (ref 65–99)
HCT VFR BLD AUTO: 39.5 % (ref 37–47)
HGB BLD-MCNC: 12.7 G/DL (ref 12–16)
IMM GRANULOCYTES # BLD AUTO: 0.05 K/UL (ref 0–0.11)
IMM GRANULOCYTES NFR BLD AUTO: 0.3 % (ref 0–0.9)
LACTATE SERPL-SCNC: 1.3 MMOL/L (ref 0.5–2)
LYMPHOCYTES # BLD AUTO: 1.65 K/UL (ref 1–4.8)
LYMPHOCYTES NFR BLD: 10.7 % (ref 22–41)
MCH RBC QN AUTO: 28.2 PG (ref 27–33)
MCHC RBC AUTO-ENTMCNC: 32.2 G/DL (ref 32.2–35.5)
MCV RBC AUTO: 87.8 FL (ref 81.4–97.8)
MONOCYTES # BLD AUTO: 0.84 K/UL (ref 0–0.85)
MONOCYTES NFR BLD AUTO: 5.5 % (ref 0–13.4)
NEUTROPHILS # BLD AUTO: 11.73 K/UL (ref 1.82–7.42)
NEUTROPHILS NFR BLD: 76.4 % (ref 44–72)
NRBC # BLD AUTO: 0 K/UL
NRBC BLD-RTO: 0 /100 WBC (ref 0–0.2)
PLATELET # BLD AUTO: 341 K/UL (ref 164–446)
PMV BLD AUTO: 10 FL (ref 9–12.9)
POTASSIUM SERPL-SCNC: 4.3 MMOL/L (ref 3.6–5.5)
PROT SERPL-MCNC: 7.4 G/DL (ref 6–8.2)
RBC # BLD AUTO: 4.5 M/UL (ref 4.2–5.4)
SIGNIFICANT IND 70042: NORMAL
SITE SITE: NORMAL
SODIUM SERPL-SCNC: 139 MMOL/L (ref 135–145)
SOURCE SOURCE: NORMAL
WBC # BLD AUTO: 15.4 K/UL (ref 4.8–10.8)

## 2025-04-29 PROCEDURE — 94640 AIRWAY INHALATION TREATMENT: CPT

## 2025-04-29 PROCEDURE — 700111 HCHG RX REV CODE 636 W/ 250 OVERRIDE (IP): Mod: UD | Performed by: STUDENT IN AN ORGANIZED HEALTH CARE EDUCATION/TRAINING PROGRAM

## 2025-04-29 PROCEDURE — 83605 ASSAY OF LACTIC ACID: CPT

## 2025-04-29 PROCEDURE — 80053 COMPREHEN METABOLIC PANEL: CPT

## 2025-04-29 PROCEDURE — 36415 COLL VENOUS BLD VENIPUNCTURE: CPT

## 2025-04-29 PROCEDURE — 700105 HCHG RX REV CODE 258: Mod: UD | Performed by: STUDENT IN AN ORGANIZED HEALTH CARE EDUCATION/TRAINING PROGRAM

## 2025-04-29 PROCEDURE — 700101 HCHG RX REV CODE 250: Mod: UD | Performed by: STUDENT IN AN ORGANIZED HEALTH CARE EDUCATION/TRAINING PROGRAM

## 2025-04-29 PROCEDURE — 94644 CONT INHLJ TX 1ST HOUR: CPT

## 2025-04-29 PROCEDURE — 71045 X-RAY EXAM CHEST 1 VIEW: CPT

## 2025-04-29 PROCEDURE — 85025 COMPLETE CBC W/AUTO DIFF WBC: CPT

## 2025-04-29 PROCEDURE — 87040 BLOOD CULTURE FOR BACTERIA: CPT

## 2025-04-29 PROCEDURE — 96365 THER/PROPH/DIAG IV INF INIT: CPT

## 2025-04-29 PROCEDURE — 96375 TX/PRO/DX INJ NEW DRUG ADDON: CPT

## 2025-04-29 PROCEDURE — A9270 NON-COVERED ITEM OR SERVICE: HCPCS | Mod: UD | Performed by: STUDENT IN AN ORGANIZED HEALTH CARE EDUCATION/TRAINING PROGRAM

## 2025-04-29 PROCEDURE — 99285 EMERGENCY DEPT VISIT HI MDM: CPT

## 2025-04-29 PROCEDURE — 700102 HCHG RX REV CODE 250 W/ 637 OVERRIDE(OP): Mod: UD | Performed by: STUDENT IN AN ORGANIZED HEALTH CARE EDUCATION/TRAINING PROGRAM

## 2025-04-29 RX ORDER — METHYLPREDNISOLONE SODIUM SUCCINATE 125 MG/2ML
125 INJECTION, POWDER, LYOPHILIZED, FOR SOLUTION INTRAMUSCULAR; INTRAVENOUS ONCE
Status: COMPLETED | OUTPATIENT
Start: 2025-04-29 | End: 2025-04-29

## 2025-04-29 RX ORDER — ALBUTEROL SULFATE 5 MG/ML
SOLUTION RESPIRATORY (INHALATION)
Status: DISCONTINUED
Start: 2025-04-29 | End: 2025-04-29 | Stop reason: HOSPADM

## 2025-04-29 RX ORDER — PREDNISONE 20 MG/1
40 TABLET ORAL DAILY
Qty: 30 TABLET | Refills: 0 | Status: SHIPPED | OUTPATIENT
Start: 2025-04-29 | End: 2025-05-03

## 2025-04-29 RX ORDER — MAGNESIUM SULFATE HEPTAHYDRATE 40 MG/ML
2 INJECTION, SOLUTION INTRAVENOUS ONCE
Status: COMPLETED | OUTPATIENT
Start: 2025-04-29 | End: 2025-04-29

## 2025-04-29 RX ORDER — SODIUM CHLORIDE 9 MG/ML
1000 INJECTION, SOLUTION INTRAVENOUS ONCE
Status: COMPLETED | OUTPATIENT
Start: 2025-04-29 | End: 2025-04-29

## 2025-04-29 RX ORDER — ALBUTEROL SULFATE 5 MG/ML
10 SOLUTION RESPIRATORY (INHALATION)
Status: COMPLETED | OUTPATIENT
Start: 2025-04-29 | End: 2025-04-29

## 2025-04-29 RX ORDER — ONDANSETRON 2 MG/ML
4 INJECTION INTRAMUSCULAR; INTRAVENOUS ONCE
Status: COMPLETED | OUTPATIENT
Start: 2025-04-29 | End: 2025-04-29

## 2025-04-29 RX ORDER — ALBUTEROL SULFATE 5 MG/ML
15 SOLUTION RESPIRATORY (INHALATION)
Status: COMPLETED | OUTPATIENT
Start: 2025-04-29 | End: 2025-04-29

## 2025-04-29 RX ORDER — ACETAMINOPHEN 325 MG/1
650 TABLET ORAL ONCE
Status: COMPLETED | OUTPATIENT
Start: 2025-04-29 | End: 2025-04-29

## 2025-04-29 RX ORDER — IPRATROPIUM BROMIDE AND ALBUTEROL SULFATE 2.5; .5 MG/3ML; MG/3ML
3 SOLUTION RESPIRATORY (INHALATION) EVERY 4 HOURS PRN
Qty: 90 ML | Refills: 0 | Status: SHIPPED | OUTPATIENT
Start: 2025-04-29 | End: 2025-05-03

## 2025-04-29 RX ORDER — ALBUTEROL SULFATE 90 UG/1
2 INHALANT RESPIRATORY (INHALATION) EVERY 6 HOURS PRN
Qty: 8.5 G | Refills: 0 | Status: SHIPPED | OUTPATIENT
Start: 2025-04-29 | End: 2025-05-03

## 2025-04-29 RX ORDER — ALBUTEROL SULFATE 0.83 MG/ML
2.5 SOLUTION RESPIRATORY (INHALATION) EVERY 4 HOURS PRN
Qty: 75 ML | Refills: 0 | Status: SHIPPED | OUTPATIENT
Start: 2025-04-29 | End: 2025-05-03

## 2025-04-29 RX ORDER — IBUPROFEN 600 MG/1
600 TABLET, FILM COATED ORAL ONCE
Status: COMPLETED | OUTPATIENT
Start: 2025-04-29 | End: 2025-04-29

## 2025-04-29 RX ADMIN — ONDANSETRON 4 MG: 2 INJECTION INTRAMUSCULAR; INTRAVENOUS at 03:47

## 2025-04-29 RX ADMIN — SODIUM CHLORIDE 1000 ML: 9 INJECTION, SOLUTION INTRAVENOUS at 06:07

## 2025-04-29 RX ADMIN — SODIUM CHLORIDE 1000 ML: 9 INJECTION, SOLUTION INTRAVENOUS at 03:49

## 2025-04-29 RX ADMIN — ALBUTEROL SULFATE 15 MG/HR: 2.5 SOLUTION RESPIRATORY (INHALATION) at 03:50

## 2025-04-29 RX ADMIN — MAGNESIUM SULFATE HEPTAHYDRATE 2 G: 2 INJECTION, SOLUTION INTRAVENOUS at 04:10

## 2025-04-29 RX ADMIN — ALBUTEROL SULFATE 10 MG/HR: 2.5 SOLUTION RESPIRATORY (INHALATION) at 06:21

## 2025-04-29 RX ADMIN — IPRATROPIUM BROMIDE 0.5 MG: 0.5 SOLUTION RESPIRATORY (INHALATION) at 03:50

## 2025-04-29 RX ADMIN — IBUPROFEN 600 MG: 600 TABLET ORAL at 06:05

## 2025-04-29 RX ADMIN — ACETAMINOPHEN 650 MG: 325 TABLET ORAL at 04:59

## 2025-04-29 RX ADMIN — METHYLPREDNISOLONE SODIUM SUCCINATE 125 MG: 125 INJECTION, POWDER, FOR SOLUTION INTRAMUSCULAR; INTRAVENOUS at 04:04

## 2025-04-29 ASSESSMENT — FIBROSIS 4 INDEX: FIB4 SCORE: 0.32

## 2025-04-29 NOTE — ED NOTES
Pt medicated per MAR, resting in NAD at this time, still on breathing tx/mask  VSS, call light in reach

## 2025-04-29 NOTE — ED NOTES
Bedside report received from off going RN/tech: Bill, assumed care of patient.  POC discussed with patient. Call light within reach, all needs addressed at this time.       Fall risk interventions in place: Move the patient closer to the nurse's station, Patient's personal possessions are with in their safe reach, Keep floor surfaces clean and dry, and Accompanied to restroom (all applicable per [place] Fall risk assessment)   Continuous monitoring: Pulse Ox or Blood Pressure  IVF/IV medications: Not Applicable   Oxygen: Room Air  Bedside sitter: Not Applicable   Isolation: Not Applicable

## 2025-04-29 NOTE — ED NOTES
ERP notified pt still has persistent HA. Pt still on breathing tx and has NS and mag running at this time

## 2025-04-29 NOTE — ED NOTES
Pt medicated per MAR, fluid bolus running  RT at BS for breathing tx and will complete the rest of meds shortly  Provided ice water per ERP OK

## 2025-04-29 NOTE — ED NOTES
Pt medicated per MAR, resting in NAD at this time. Pt remains drowsy and doses on and off. Second NS bolus started, pt states she will take breathing tx, RT notified

## 2025-04-29 NOTE — DISCHARGE INSTRUCTIONS
You have chosen to leave despite the recommendation for continued breathing treatments.  I understand you have a very important reason for making this decision.  Please do not hesitate to return to the ER if you are having worsening symptoms.  Depending on how your condition progresses it is very possible you may need admission for your asthma exacerbation.

## 2025-04-29 NOTE — ED TRIAGE NOTES
"Chief Complaint   Patient presents with    Asthma     Pt arrives through triage for asthma exacerbation, hx of asthma      BP (!) 152/110   Pulse (!) 126   Temp 36.6 °C (97.8 °F) (Temporal)   Resp 20   Ht 1.676 m (5' 6\")   Wt 57.1 kg (125 lb 14.1 oz)   SpO2 90%   BMI 20.32 kg/m²     Pt checked into ED through ER lobby for asthma exacerbation  Pt brought straight back d/t low oxygen levels, tachycardia and HTN VS UA. Increased WOB, SOB, audible wheezing  Pt has hx of asthma and recent admission beginning this month  Pt placed on 3L NC    Sepsis score of 3-protocol ordered  "

## 2025-04-29 NOTE — ED NOTES
PIV est, labs collected and sent including one set of cultures, XR at BS  Messaged RT about breathing tx

## 2025-04-29 NOTE — ED PROVIDER NOTES
ER Provider Note    Scribed for Jessica Jaime D.o. by Shilpa Adair. 4/29/2025  3:35 AM    Primary Care Provider: Pcp Pt States None    CHIEF COMPLAINT   Chief Complaint   Patient presents with    Asthma     Pt arrives through triage for asthma exacerbation, hx of asthma      EXTERNAL RECORDS REVIEWED  Inpatient Notes admission for shortness of breath 4/2025    HPI/ROS  LIMITATION TO HISTORY   Select: : None  OUTSIDE HISTORIAN(S):  None    Alecia Keller is a 24 y.o. female who presents to the ED complaining of asthma onset a few days ago. The patient explains she started having symptoms earlier this week but it worsened throughout the days. She states she now struggles to move from her couch to the bathroom. Patient notes when she usually has issues with her asthma she will do a couple breathing treatments at home and it would resolve. However, she states her symptoms have only been persisting. Patient adds she is out of steroids. She has associated symptoms of headache and vomiting.      PAST MEDICAL HISTORY  Past Medical History:   Diagnosis Date    Allergy     Anxiety     ASTHMA 11/3/2011    Asthma     Pleurisy 2008    Pneumonia 2008       SURGICAL HISTORY  History reviewed. No pertinent surgical history.    FAMILY HISTORY  Family History   Problem Relation Age of Onset    Other Mother         liver disease    Diabetes Father     Hypertension Father     Cancer Maternal Grandfather         mult myeloma    Heart Disease Neg Hx     Hyperlipidemia Neg Hx     Stroke Neg Hx        SOCIAL HISTORY   reports that she has quit smoking. Her smoking use included cigarettes. She has a 0.5 pack-year smoking history. She has never used smokeless tobacco. She reports current alcohol use. She reports current drug use. Drugs: Marijuana, Methamphetamines, Cocaine, Intravenous, and Inhaled.    CURRENT MEDICATIONS  Previous Medications    ACETAMINOPHEN (TYLENOL) 325 MG TAB    Take 3 Tablets by mouth every 6 hours as  "needed for Mild Pain.    ALBUTEROL (ACCUNEB) 0.63 MG/3ML NEBULIZER SOLUTION    Take 3 mL by nebulization every four hours as needed for Shortness of Breath.    ALBUTEROL 108 (90 BASE) MCG/ACT AERO SOLN INHALATION AEROSOL    Inhale 2 Puffs every 6 hours as needed for Shortness of Breath.    BENZONATATE (TESSALON) 100 MG CAP    Take 1 Capsule by mouth 3 times a day as needed for Cough.    FLUTICASONE-SALMETEROL (ADVAIR) 250-50 MCG/ACT AEROSOL POWDER, BREATH ACTIVATED    Inhale 1 Puff every 12 hours.    IBUPROFEN (MOTRIN) 400 MG TAB    Take 1 Tablet by mouth every 6 hours as needed for Moderate Pain.       ALLERGIES  Pcn [penicillins], Pcn [penicillins], and Pineapple    PHYSICAL EXAM  BP (!) 152/110   Pulse (!) 126   Temp 36.6 °C (97.8 °F) (Temporal)   Resp (!) 22   Ht 1.676 m (5' 6\")   Wt 57.1 kg (125 lb 14.1 oz)   SpO2 90%   BMI 20.32 kg/m²   Pulse oximetry interpretation: I interpret the pulse oximetry as normal.  Constitutional: Awake and alert. No acute distress.  Head: NCAT.  HEENT: Normal Conjunctiva.  Neck: Grossly normal range of motion. Airway midline.  Cardiovascular: Normal heart rate, Normal rhythm.  Thorax & Lungs: No respiratory distress. Expiratory more than inspiratory wheezing, talking in full sentences.  Abdomen: Normal inspection. Nontender. Nondistended  Skin: No obvious rash.  Back: No CVA tenderness.   Musculoskeletal: No obvious deformity. Moves all extremities Well.  Neurologic: A&Ox4.   Psychiatric: Mood and affect are appropriate for situation.     DIAGNOSTIC STUDIES    EKG/LABS  Results for orders placed or performed during the hospital encounter of 04/29/25   Lactic Acid    Collection Time: 04/29/25  3:37 AM   Result Value Ref Range    Lactic Acid 1.3 0.5 - 2.0 mmol/L   CBC with Differential    Collection Time: 04/29/25  3:37 AM   Result Value Ref Range    WBC 15.4 (H) 4.8 - 10.8 K/uL    RBC 4.50 4.20 - 5.40 M/uL    Hemoglobin 12.7 12.0 - 16.0 g/dL    Hematocrit 39.5 37.0 - 47.0 % "    MCV 87.8 81.4 - 97.8 fL    MCH 28.2 27.0 - 33.0 pg    MCHC 32.2 32.2 - 35.5 g/dL    RDW 45.9 35.9 - 50.0 fL    Platelet Count 341 164 - 446 K/uL    MPV 10.0 9.0 - 12.9 fL    Neutrophils-Polys 76.40 (H) 44.00 - 72.00 %    Lymphocytes 10.70 (L) 22.00 - 41.00 %    Monocytes 5.50 0.00 - 13.40 %    Eosinophils 6.70 0.00 - 6.90 %    Basophils 0.40 0.00 - 1.80 %    Immature Granulocytes 0.30 0.00 - 0.90 %    Nucleated RBC 0.00 0.00 - 0.20 /100 WBC    Neutrophils (Absolute) 11.73 (H) 1.82 - 7.42 K/uL    Lymphs (Absolute) 1.65 1.00 - 4.80 K/uL    Monos (Absolute) 0.84 0.00 - 0.85 K/uL    Eos (Absolute) 1.03 (H) 0.00 - 0.51 K/uL    Baso (Absolute) 0.06 0.00 - 0.12 K/uL    Immature Granulocytes (abs) 0.05 0.00 - 0.11 K/uL    NRBC (Absolute) 0.00 K/uL   Complete Metabolic Panel    Collection Time: 04/29/25  3:37 AM   Result Value Ref Range    Sodium 139 135 - 145 mmol/L    Potassium 4.3 3.6 - 5.5 mmol/L    Chloride 103 96 - 112 mmol/L    Co2 28 20 - 33 mmol/L    Anion Gap 8.0 7.0 - 16.0    Glucose 97 65 - 99 mg/dL    Bun 7 (L) 8 - 22 mg/dL    Creatinine 0.75 0.50 - 1.40 mg/dL    Calcium 8.8 8.5 - 10.5 mg/dL    Correct Calcium 9.1 8.5 - 10.5 mg/dL    AST(SGOT) 21 12 - 45 U/L    ALT(SGPT) 12 2 - 50 U/L    Alkaline Phosphatase 104 (H) 30 - 99 U/L    Total Bilirubin 0.2 0.1 - 1.5 mg/dL    Albumin 3.6 3.2 - 4.9 g/dL    Total Protein 7.4 6.0 - 8.2 g/dL    Globulin 3.8 (H) 1.9 - 3.5 g/dL    A-G Ratio 0.9 g/dL   Blood Culture - Draw one from central line and one from peripheral site    Collection Time: 04/29/25  3:37 AM    Specimen: Peripheral; Blood   Result Value Ref Range    Significant Indicator NEG     Source BLD     Site PERIPHERAL     Culture Result       No Growth  Note: Blood cultures are incubated for 5 days and  are monitored continuously.Positive blood cultures  are called to the RN and reported as soon as  they are identified.     ESTIMATED GFR    Collection Time: 04/29/25  3:37 AM   Result Value Ref Range    GFR  (CKD-EPI) 113 >60 mL/min/1.73 m 2     RADIOLOGY/PROCEDURES   The attending emergency physician has independently interpreted the diagnostic imaging associated with this visit and am waiting the final reading from the radiologist.   My preliminary interpretation is a follows: No focal opacity    Radiologist interpretation:  DX-CHEST-PORTABLE (1 VIEW)   Final Result         1. No acute cardiopulmonary abnormalities identified.                         COURSE & MEDICAL DECISION MAKING     ASSESSMENT, COURSE AND PLAN  Care Narrative:   24-year-old female history of asthma presents for asthma exacerbation  Afebrile and reassuring vitals  On exam she is expiratory more than inspiratory wheezes in all fields.  She is able to complete full sentences and is exhibiting only mild respiratory distress.  Differential includes asthma, acute trigger such as infection  She is given an hour-long nebulizer treatment and methylprednisolone IV.  She was also given IV magnesium and IV fluids  She triggered SIRS criteria and labs were sent.  She has a leukocytosis to 15, CMP without derangements  Lactic acid is normal  Chest x-ray no focal opacity    She tells me she has an important court date today.  I discussed that after breathing treatment she is still wheezy and may need several additional breathing treatments.  She reports she cannot miss her court date and declines being admitted at all costs.  We will do an additional breathing treatment and discharge her on steroids in an attempt to help her manage her symptoms as an outpatient.    Additional breathing treatment given.  Her clinical condition is improving.  She does have faint wheezes now, she is not hypoxic.    Discussed returning after her court appearance if still symptomatic or if worsening symptoms.  Patient verbalized understanding and continues to wish to be discharged.    Her medications and steroids are sent to the pharmacy.    Hydration: Based on the patient's  presentation of Tachycardia the patient was given IV fluids. IV Hydration was used because oral hydration was not as rapid as required. Upon recheck following hydration, the patient was improved.    ADDITIONAL PROBLEM LIST    asthma    DISPOSITION AND DISCUSSIONS  I have discussed management of the patient with the following physicians and JW's:  none    Discussion of management with other QHP or appropriate source(s): None     Escalation of care considered, and ultimately not performed: after discussion with the patient / family, they have elected to decline an escalation in care, IV fluids, Laboratory analysis, and diagnostic imaging.    Barriers to care at this time, including but not limited to:  None .     Decision tools and prescription drugs considered including, but not limited to:  None .    FINAL DIANGOSIS  1. Moderate asthma with acute exacerbation, unspecified whether persistent        The note accurately reflects work and decisions made by me.  Jessica Jaime D.O.  4/29/2025  7:33 AM

## 2025-05-02 ENCOUNTER — HOSPITAL ENCOUNTER (INPATIENT)
Facility: MEDICAL CENTER | Age: 25
LOS: 1 days | DRG: 202 | End: 2025-05-03
Attending: STUDENT IN AN ORGANIZED HEALTH CARE EDUCATION/TRAINING PROGRAM | Admitting: STUDENT IN AN ORGANIZED HEALTH CARE EDUCATION/TRAINING PROGRAM
Payer: COMMERCIAL

## 2025-05-02 ENCOUNTER — APPOINTMENT (OUTPATIENT)
Dept: RADIOLOGY | Facility: MEDICAL CENTER | Age: 25
DRG: 202 | End: 2025-05-02
Attending: STUDENT IN AN ORGANIZED HEALTH CARE EDUCATION/TRAINING PROGRAM
Payer: COMMERCIAL

## 2025-05-02 DIAGNOSIS — S02.5XXA CLOSED FRACTURE OF TOOTH, INITIAL ENCOUNTER: ICD-10-CM

## 2025-05-02 DIAGNOSIS — K04.7 DENTAL INFECTION: ICD-10-CM

## 2025-05-02 DIAGNOSIS — J45.901 MODERATE ASTHMA WITH ACUTE EXACERBATION, UNSPECIFIED WHETHER PERSISTENT: ICD-10-CM

## 2025-05-02 DIAGNOSIS — J45.901 ASTHMA WITH ACUTE EXACERBATION, UNSPECIFIED ASTHMA SEVERITY, UNSPECIFIED WHETHER PERSISTENT: ICD-10-CM

## 2025-05-02 DIAGNOSIS — J96.01 ACUTE RESPIRATORY FAILURE WITH HYPOXIA (HCC): ICD-10-CM

## 2025-05-02 DIAGNOSIS — J45.901 EXACERBATION OF ASTHMA, UNSPECIFIED ASTHMA SEVERITY, UNSPECIFIED WHETHER PERSISTENT: ICD-10-CM

## 2025-05-02 DIAGNOSIS — R11.2 NAUSEA AND VOMITING, UNSPECIFIED VOMITING TYPE: ICD-10-CM

## 2025-05-02 LAB
ALBUMIN SERPL BCP-MCNC: 3.7 G/DL (ref 3.2–4.9)
ALBUMIN/GLOB SERPL: 0.9 G/DL
ALP SERPL-CCNC: 109 U/L (ref 30–99)
ALT SERPL-CCNC: 18 U/L (ref 2–50)
ANION GAP SERPL CALC-SCNC: 13 MMOL/L (ref 7–16)
APTT PPP: 32.4 SEC (ref 24.7–36)
AST SERPL-CCNC: 17 U/L (ref 12–45)
BASOPHILS # BLD AUTO: 0.6 % (ref 0–1.8)
BASOPHILS # BLD: 0.13 K/UL (ref 0–0.12)
BILIRUB SERPL-MCNC: 0.5 MG/DL (ref 0.1–1.5)
BUN SERPL-MCNC: 13 MG/DL (ref 8–22)
CALCIUM ALBUM COR SERPL-MCNC: 9.3 MG/DL (ref 8.5–10.5)
CALCIUM SERPL-MCNC: 9.1 MG/DL (ref 8.5–10.5)
CHLORIDE SERPL-SCNC: 93 MMOL/L (ref 96–112)
CO2 SERPL-SCNC: 23 MMOL/L (ref 20–33)
CREAT SERPL-MCNC: 1.1 MG/DL (ref 0.5–1.4)
EOSINOPHIL # BLD AUTO: 0.58 K/UL (ref 0–0.51)
EOSINOPHIL NFR BLD: 2.8 % (ref 0–6.9)
ERYTHROCYTE [DISTWIDTH] IN BLOOD BY AUTOMATED COUNT: 43.5 FL (ref 35.9–50)
FLUAV RNA SPEC QL NAA+PROBE: NEGATIVE
FLUBV RNA SPEC QL NAA+PROBE: NEGATIVE
GFR SERPLBLD CREATININE-BSD FMLA CKD-EPI: 71 ML/MIN/1.73 M 2
GLOBULIN SER CALC-MCNC: 4 G/DL (ref 1.9–3.5)
GLUCOSE SERPL-MCNC: 104 MG/DL (ref 65–99)
HCT VFR BLD AUTO: 37.3 % (ref 37–47)
HGB BLD-MCNC: 12.3 G/DL (ref 12–16)
IMM GRANULOCYTES # BLD AUTO: 0.22 K/UL (ref 0–0.11)
IMM GRANULOCYTES NFR BLD AUTO: 1.1 % (ref 0–0.9)
INR PPP: 1.14 (ref 0.87–1.13)
LACTATE SERPL-SCNC: 0.7 MMOL/L (ref 0.5–2)
LIPASE SERPL-CCNC: 15 U/L (ref 11–82)
LYMPHOCYTES # BLD AUTO: 1.98 K/UL (ref 1–4.8)
LYMPHOCYTES NFR BLD: 9.7 % (ref 22–41)
MCH RBC QN AUTO: 27.5 PG (ref 27–33)
MCHC RBC AUTO-ENTMCNC: 33 G/DL (ref 32.2–35.5)
MCV RBC AUTO: 83.4 FL (ref 81.4–97.8)
MONOCYTES # BLD AUTO: 1.5 K/UL (ref 0–0.85)
MONOCYTES NFR BLD AUTO: 7.4 % (ref 0–13.4)
NEUTROPHILS # BLD AUTO: 15.98 K/UL (ref 1.82–7.42)
NEUTROPHILS NFR BLD: 78.4 % (ref 44–72)
NRBC # BLD AUTO: 0 K/UL
NRBC BLD-RTO: 0 /100 WBC (ref 0–0.2)
PLATELET # BLD AUTO: 401 K/UL (ref 164–446)
PMV BLD AUTO: 9.8 FL (ref 9–12.9)
POTASSIUM SERPL-SCNC: 4.3 MMOL/L (ref 3.6–5.5)
PROT SERPL-MCNC: 7.7 G/DL (ref 6–8.2)
PROTHROMBIN TIME: 14.6 SEC (ref 12–14.6)
RBC # BLD AUTO: 4.47 M/UL (ref 4.2–5.4)
RSV RNA SPEC QL NAA+PROBE: NEGATIVE
SARS-COV-2 RNA RESP QL NAA+PROBE: NOTDETECTED
SODIUM SERPL-SCNC: 129 MMOL/L (ref 135–145)
WBC # BLD AUTO: 20.4 K/UL (ref 4.8–10.8)

## 2025-05-02 PROCEDURE — 86850 RBC ANTIBODY SCREEN: CPT

## 2025-05-02 PROCEDURE — 0241U HCHG SARS-COV-2 COVID-19 NFCT DS RESP RNA 4 TRGT ED POC: CPT

## 2025-05-02 PROCEDURE — 93005 ELECTROCARDIOGRAM TRACING: CPT | Mod: TC | Performed by: STUDENT IN AN ORGANIZED HEALTH CARE EDUCATION/TRAINING PROGRAM

## 2025-05-02 PROCEDURE — 96375 TX/PRO/DX INJ NEW DRUG ADDON: CPT

## 2025-05-02 PROCEDURE — 83690 ASSAY OF LIPASE: CPT

## 2025-05-02 PROCEDURE — 86901 BLOOD TYPING SEROLOGIC RH(D): CPT | Mod: 91

## 2025-05-02 PROCEDURE — 86900 BLOOD TYPING SEROLOGIC ABO: CPT

## 2025-05-02 PROCEDURE — 85730 THROMBOPLASTIN TIME PARTIAL: CPT

## 2025-05-02 PROCEDURE — 94640 AIRWAY INHALATION TREATMENT: CPT

## 2025-05-02 PROCEDURE — 84145 PROCALCITONIN (PCT): CPT

## 2025-05-02 PROCEDURE — 87040 BLOOD CULTURE FOR BACTERIA: CPT | Mod: 91

## 2025-05-02 PROCEDURE — 93005 ELECTROCARDIOGRAM TRACING: CPT | Mod: TC

## 2025-05-02 PROCEDURE — 85025 COMPLETE CBC W/AUTO DIFF WBC: CPT

## 2025-05-02 PROCEDURE — 85610 PROTHROMBIN TIME: CPT

## 2025-05-02 PROCEDURE — 71045 X-RAY EXAM CHEST 1 VIEW: CPT

## 2025-05-02 PROCEDURE — 700111 HCHG RX REV CODE 636 W/ 250 OVERRIDE (IP): Mod: JZ,UD | Performed by: STUDENT IN AN ORGANIZED HEALTH CARE EDUCATION/TRAINING PROGRAM

## 2025-05-02 PROCEDURE — 700101 HCHG RX REV CODE 250: Mod: UD | Performed by: STUDENT IN AN ORGANIZED HEALTH CARE EDUCATION/TRAINING PROGRAM

## 2025-05-02 PROCEDURE — 80053 COMPREHEN METABOLIC PANEL: CPT

## 2025-05-02 PROCEDURE — 83605 ASSAY OF LACTIC ACID: CPT

## 2025-05-02 PROCEDURE — 99285 EMERGENCY DEPT VISIT HI MDM: CPT

## 2025-05-02 RX ORDER — METHYLPREDNISOLONE SODIUM SUCCINATE 125 MG/2ML
125 INJECTION, POWDER, LYOPHILIZED, FOR SOLUTION INTRAMUSCULAR; INTRAVENOUS ONCE
Status: COMPLETED | OUTPATIENT
Start: 2025-05-02 | End: 2025-05-02

## 2025-05-02 RX ORDER — IPRATROPIUM BROMIDE AND ALBUTEROL SULFATE 2.5; .5 MG/3ML; MG/3ML
3 SOLUTION RESPIRATORY (INHALATION) ONCE
Status: COMPLETED | OUTPATIENT
Start: 2025-05-02 | End: 2025-05-02

## 2025-05-02 RX ADMIN — IPRATROPIUM BROMIDE AND ALBUTEROL SULFATE 3 ML: .5; 2.5 SOLUTION RESPIRATORY (INHALATION) at 23:14

## 2025-05-02 RX ADMIN — METHYLPREDNISOLONE SODIUM SUCCINATE 125 MG: 125 INJECTION, POWDER, FOR SOLUTION INTRAMUSCULAR; INTRAVENOUS at 23:31

## 2025-05-02 ASSESSMENT — FIBROSIS 4 INDEX: FIB4 SCORE: 0.44

## 2025-05-03 ENCOUNTER — PHARMACY VISIT (OUTPATIENT)
Dept: PHARMACY | Facility: MEDICAL CENTER | Age: 25
End: 2025-05-03
Payer: MEDICARE

## 2025-05-03 ENCOUNTER — APPOINTMENT (OUTPATIENT)
Dept: RADIOLOGY | Facility: MEDICAL CENTER | Age: 25
DRG: 202 | End: 2025-05-03
Attending: STUDENT IN AN ORGANIZED HEALTH CARE EDUCATION/TRAINING PROGRAM
Payer: COMMERCIAL

## 2025-05-03 VITALS
WEIGHT: 120 LBS | DIASTOLIC BLOOD PRESSURE: 53 MMHG | HEIGHT: 66 IN | OXYGEN SATURATION: 95 % | BODY MASS INDEX: 19.29 KG/M2 | TEMPERATURE: 98.8 F | RESPIRATION RATE: 16 BRPM | SYSTOLIC BLOOD PRESSURE: 92 MMHG | HEART RATE: 93 BPM

## 2025-05-03 PROBLEM — R11.2 NAUSEA & VOMITING: Status: ACTIVE | Noted: 2025-05-03

## 2025-05-03 PROBLEM — J12.9 VIRAL PNEUMONIA: Status: ACTIVE | Noted: 2025-05-03

## 2025-05-03 PROBLEM — J45.901 ASTHMA EXACERBATION: Status: RESOLVED | Noted: 2025-04-07 | Resolved: 2025-05-03

## 2025-05-03 PROBLEM — R11.2 NAUSEA & VOMITING: Status: RESOLVED | Noted: 2025-05-03 | Resolved: 2025-05-03

## 2025-05-03 PROBLEM — J45.901 ASTHMA EXACERBATION: Status: ACTIVE | Noted: 2025-04-07

## 2025-05-03 PROBLEM — J96.01 ACUTE HYPOXIC RESPIRATORY FAILURE (HCC): Status: ACTIVE | Noted: 2025-05-03

## 2025-05-03 PROBLEM — J18.9 PNEUMONIA: Status: ACTIVE | Noted: 2025-05-03

## 2025-05-03 PROBLEM — J96.01 ACUTE HYPOXIC RESPIRATORY FAILURE (HCC): Status: RESOLVED | Noted: 2025-05-03 | Resolved: 2025-05-03

## 2025-05-03 LAB
ABO + RH BLD: NORMAL
ABO GROUP BLD: NORMAL
BLD GP AB SCN SERPL QL: NORMAL
EKG IMPRESSION: NORMAL
PROCALCITONIN SERPL-MCNC: 0.23 NG/ML
RH BLD: NORMAL

## 2025-05-03 PROCEDURE — 700105 HCHG RX REV CODE 258: Mod: UD | Performed by: STUDENT IN AN ORGANIZED HEALTH CARE EDUCATION/TRAINING PROGRAM

## 2025-05-03 PROCEDURE — 700101 HCHG RX REV CODE 250: Mod: UD | Performed by: STUDENT IN AN ORGANIZED HEALTH CARE EDUCATION/TRAINING PROGRAM

## 2025-05-03 PROCEDURE — 96367 TX/PROPH/DG ADDL SEQ IV INF: CPT

## 2025-05-03 PROCEDURE — 99222 1ST HOSP IP/OBS MODERATE 55: CPT | Performed by: STUDENT IN AN ORGANIZED HEALTH CARE EDUCATION/TRAINING PROGRAM

## 2025-05-03 PROCEDURE — 700105 HCHG RX REV CODE 258: Performed by: STUDENT IN AN ORGANIZED HEALTH CARE EDUCATION/TRAINING PROGRAM

## 2025-05-03 PROCEDURE — 306637 HCHG MISC ORTHO ITEM RC 0274

## 2025-05-03 PROCEDURE — 96361 HYDRATE IV INFUSION ADD-ON: CPT

## 2025-05-03 PROCEDURE — 700102 HCHG RX REV CODE 250 W/ 637 OVERRIDE(OP): Performed by: STUDENT IN AN ORGANIZED HEALTH CARE EDUCATION/TRAINING PROGRAM

## 2025-05-03 PROCEDURE — A9270 NON-COVERED ITEM OR SERVICE: HCPCS | Performed by: STUDENT IN AN ORGANIZED HEALTH CARE EDUCATION/TRAINING PROGRAM

## 2025-05-03 PROCEDURE — 74018 RADEX ABDOMEN 1 VIEW: CPT

## 2025-05-03 PROCEDURE — RXMED WILLOW AMBULATORY MEDICATION CHARGE: Performed by: STUDENT IN AN ORGANIZED HEALTH CARE EDUCATION/TRAINING PROGRAM

## 2025-05-03 PROCEDURE — 94640 AIRWAY INHALATION TREATMENT: CPT

## 2025-05-03 PROCEDURE — 36415 COLL VENOUS BLD VENIPUNCTURE: CPT

## 2025-05-03 PROCEDURE — 96375 TX/PRO/DX INJ NEW DRUG ADDON: CPT

## 2025-05-03 PROCEDURE — 96365 THER/PROPH/DIAG IV INF INIT: CPT

## 2025-05-03 PROCEDURE — 700111 HCHG RX REV CODE 636 W/ 250 OVERRIDE (IP): Mod: JZ,UD | Performed by: STUDENT IN AN ORGANIZED HEALTH CARE EDUCATION/TRAINING PROGRAM

## 2025-05-03 PROCEDURE — 700101 HCHG RX REV CODE 250: Performed by: STUDENT IN AN ORGANIZED HEALTH CARE EDUCATION/TRAINING PROGRAM

## 2025-05-03 PROCEDURE — 700111 HCHG RX REV CODE 636 W/ 250 OVERRIDE (IP): Mod: JZ | Performed by: STUDENT IN AN ORGANIZED HEALTH CARE EDUCATION/TRAINING PROGRAM

## 2025-05-03 RX ORDER — AZITHROMYCIN 500 MG/1
500 TABLET, FILM COATED ORAL DAILY
Qty: 2 TABLET | Refills: 0 | Status: ACTIVE | OUTPATIENT
Start: 2025-05-04

## 2025-05-03 RX ORDER — PREDNISONE 20 MG/1
40 TABLET ORAL DAILY
Status: DISCONTINUED | OUTPATIENT
Start: 2025-05-04 | End: 2025-05-03 | Stop reason: HOSPADM

## 2025-05-03 RX ORDER — ALBUTEROL SULFATE 90 UG/1
2 INHALANT RESPIRATORY (INHALATION)
Status: DISCONTINUED | OUTPATIENT
Start: 2025-05-03 | End: 2025-05-03 | Stop reason: HOSPADM

## 2025-05-03 RX ORDER — AMOXICILLIN 250 MG
2 CAPSULE ORAL EVERY EVENING
Status: DISCONTINUED | OUTPATIENT
Start: 2025-05-03 | End: 2025-05-03 | Stop reason: HOSPADM

## 2025-05-03 RX ORDER — ALBUTEROL SULFATE 5 MG/ML
2.5 SOLUTION RESPIRATORY (INHALATION) ONCE
Status: COMPLETED | OUTPATIENT
Start: 2025-05-03 | End: 2025-05-03

## 2025-05-03 RX ORDER — PROCHLORPERAZINE EDISYLATE 5 MG/ML
10 INJECTION INTRAMUSCULAR; INTRAVENOUS EVERY 6 HOURS PRN
Status: DISCONTINUED | OUTPATIENT
Start: 2025-05-03 | End: 2025-05-03 | Stop reason: HOSPADM

## 2025-05-03 RX ORDER — FLUTICASONE PROPIONATE AND SALMETEROL 250; 50 UG/1; UG/1
1 POWDER RESPIRATORY (INHALATION) EVERY 12 HOURS
Status: DISCONTINUED | OUTPATIENT
Start: 2025-05-03 | End: 2025-05-03

## 2025-05-03 RX ORDER — ONDANSETRON 4 MG/1
4 TABLET, ORALLY DISINTEGRATING ORAL EVERY 4 HOURS PRN
Qty: 30 TABLET | Refills: 0 | Status: SHIPPED | OUTPATIENT
Start: 2025-05-03

## 2025-05-03 RX ORDER — CEFTRIAXONE 2 G/1
2000 INJECTION, POWDER, FOR SOLUTION INTRAMUSCULAR; INTRAVENOUS ONCE
Status: COMPLETED | OUTPATIENT
Start: 2025-05-03 | End: 2025-05-03

## 2025-05-03 RX ORDER — PREDNISONE 20 MG/1
40 TABLET ORAL DAILY
Qty: 8 TABLET | Refills: 0 | Status: SHIPPED | OUTPATIENT
Start: 2025-05-03 | End: 2025-05-07

## 2025-05-03 RX ORDER — ONDANSETRON 2 MG/ML
4 INJECTION INTRAMUSCULAR; INTRAVENOUS ONCE
Status: COMPLETED | OUTPATIENT
Start: 2025-05-03 | End: 2025-05-03

## 2025-05-03 RX ORDER — ACETAMINOPHEN 500 MG
1000 TABLET ORAL EVERY 6 HOURS PRN
COMMUNITY
Start: 2025-05-03

## 2025-05-03 RX ORDER — POLYETHYLENE GLYCOL 3350 17 G/17G
1 POWDER, FOR SOLUTION ORAL
Status: DISCONTINUED | OUTPATIENT
Start: 2025-05-03 | End: 2025-05-03 | Stop reason: HOSPADM

## 2025-05-03 RX ORDER — SODIUM CHLORIDE 9 MG/ML
2000 INJECTION, SOLUTION INTRAVENOUS ONCE
Status: COMPLETED | OUTPATIENT
Start: 2025-05-03 | End: 2025-05-03

## 2025-05-03 RX ORDER — ALBUTEROL SULFATE 5 MG/ML
2.5 SOLUTION RESPIRATORY (INHALATION)
Status: DISCONTINUED | OUTPATIENT
Start: 2025-05-03 | End: 2025-05-03 | Stop reason: HOSPADM

## 2025-05-03 RX ORDER — PROCHLORPERAZINE MALEATE 5 MG/1
5 TABLET ORAL EVERY 6 HOURS PRN
Qty: 30 TABLET | Refills: 0 | Status: SHIPPED | OUTPATIENT
Start: 2025-05-03

## 2025-05-03 RX ORDER — FLUTICASONE PROPIONATE AND SALMETEROL 250; 50 UG/1; UG/1
1 POWDER RESPIRATORY (INHALATION) EVERY 12 HOURS
Qty: 120 EACH | Refills: 0 | Status: SHIPPED | OUTPATIENT
Start: 2025-05-03

## 2025-05-03 RX ORDER — ONDANSETRON 2 MG/ML
4 INJECTION INTRAMUSCULAR; INTRAVENOUS EVERY 4 HOURS PRN
Status: DISCONTINUED | OUTPATIENT
Start: 2025-05-03 | End: 2025-05-03 | Stop reason: HOSPADM

## 2025-05-03 RX ORDER — ALBUTEROL SULFATE 90 UG/1
2 INHALANT RESPIRATORY (INHALATION) EVERY 6 HOURS PRN
Qty: 36 G | Refills: 0 | Status: SHIPPED | OUTPATIENT
Start: 2025-05-03

## 2025-05-03 RX ORDER — IPRATROPIUM BROMIDE AND ALBUTEROL SULFATE 2.5; .5 MG/3ML; MG/3ML
3 SOLUTION RESPIRATORY (INHALATION)
Status: DISCONTINUED | OUTPATIENT
Start: 2025-05-03 | End: 2025-05-03 | Stop reason: HOSPADM

## 2025-05-03 RX ORDER — HYDRALAZINE HYDROCHLORIDE 20 MG/ML
10 INJECTION INTRAMUSCULAR; INTRAVENOUS EVERY 4 HOURS PRN
Status: DISCONTINUED | OUTPATIENT
Start: 2025-05-03 | End: 2025-05-03

## 2025-05-03 RX ORDER — AZITHROMYCIN 500 MG/5ML
500 INJECTION, POWDER, LYOPHILIZED, FOR SOLUTION INTRAVENOUS EVERY 24 HOURS
Status: DISCONTINUED | OUTPATIENT
Start: 2025-05-03 | End: 2025-05-03

## 2025-05-03 RX ORDER — METRONIDAZOLE 500 MG/100ML
500 INJECTION, SOLUTION INTRAVENOUS ONCE
Status: COMPLETED | OUTPATIENT
Start: 2025-05-03 | End: 2025-05-03

## 2025-05-03 RX ORDER — AZITHROMYCIN 250 MG/1
500 TABLET, FILM COATED ORAL DAILY
Status: DISCONTINUED | OUTPATIENT
Start: 2025-05-04 | End: 2025-05-03 | Stop reason: HOSPADM

## 2025-05-03 RX ORDER — PREDNISONE 20 MG/1
40 TABLET ORAL DAILY
Status: DISCONTINUED | OUTPATIENT
Start: 2025-05-03 | End: 2025-05-03

## 2025-05-03 RX ORDER — ENOXAPARIN SODIUM 100 MG/ML
40 INJECTION SUBCUTANEOUS DAILY
Status: DISCONTINUED | OUTPATIENT
Start: 2025-05-03 | End: 2025-05-03 | Stop reason: HOSPADM

## 2025-05-03 RX ORDER — ACETAMINOPHEN 325 MG/1
650 TABLET ORAL EVERY 6 HOURS PRN
Status: DISCONTINUED | OUTPATIENT
Start: 2025-05-03 | End: 2025-05-03 | Stop reason: HOSPADM

## 2025-05-03 RX ADMIN — MOMETASONE FUROATE AND FORMOTEROL FUMARATE DIHYDRATE 2 PUFF: 200; 5 AEROSOL RESPIRATORY (INHALATION) at 06:30

## 2025-05-03 RX ADMIN — CEFTRIAXONE SODIUM 2000 MG: 2 INJECTION, POWDER, FOR SOLUTION INTRAMUSCULAR; INTRAVENOUS at 00:42

## 2025-05-03 RX ADMIN — METRONIDAZOLE 500 MG: 500 INJECTION, SOLUTION INTRAVENOUS at 01:14

## 2025-05-03 RX ADMIN — SODIUM CHLORIDE 2000 ML: 9 INJECTION, SOLUTION INTRAVENOUS at 00:37

## 2025-05-03 RX ADMIN — ALBUTEROL SULFATE 2.5 MG: 2.5 SOLUTION RESPIRATORY (INHALATION) at 01:09

## 2025-05-03 RX ADMIN — PREDNISONE 40 MG: 20 TABLET ORAL at 06:29

## 2025-05-03 RX ADMIN — IPRATROPIUM BROMIDE AND ALBUTEROL SULFATE 3 ML: 2.5; .5 SOLUTION RESPIRATORY (INHALATION) at 06:18

## 2025-05-03 RX ADMIN — AZITHROMYCIN 500 MG: 500 INJECTION, POWDER, LYOPHILIZED, FOR SOLUTION INTRAVENOUS at 06:32

## 2025-05-03 RX ADMIN — ONDANSETRON 4 MG: 2 INJECTION INTRAMUSCULAR; INTRAVENOUS at 00:52

## 2025-05-03 ASSESSMENT — ENCOUNTER SYMPTOMS
NECK PAIN: 0
EYE PAIN: 0
CHILLS: 0
WHEEZING: 0
DIZZINESS: 0
HEMOPTYSIS: 0
DOUBLE VISION: 0
HEADACHES: 0
FEVER: 0
VOMITING: 1
SPUTUM PRODUCTION: 0
COUGH: 1
DIARRHEA: 0
NAUSEA: 1
BACK PAIN: 0
SHORTNESS OF BREATH: 1
PHOTOPHOBIA: 0
ORTHOPNEA: 0
EYE DISCHARGE: 0
PALPITATIONS: 0
ABDOMINAL PAIN: 0

## 2025-05-03 ASSESSMENT — COPD QUESTIONNAIRES
DO YOU EVER COUGH UP ANY MUCUS OR PHLEGM?: NO/ONLY WITH OCCASIONAL COLDS OR INFECTIONS
DURING THE PAST 4 WEEKS HOW MUCH DID YOU FEEL SHORT OF BREATH: NONE/LITTLE OF THE TIME
HAVE YOU SMOKED AT LEAST 100 CIGARETTES IN YOUR ENTIRE LIFE: YES
COPD SCREENING SCORE: 2

## 2025-05-03 NOTE — DISCHARGE SUMMARY
Discharge Summary    CHIEF COMPLAINT ON ADMISSION  Chief Complaint   Patient presents with    Flu Like Symptoms    Blood in Vomit    Shortness of Breath       Reason for Admission  Acute Hypoxia due to Viral Illness    Admission Date  5/2/2025    CODE STATUS  Full Code    HPI & HOSPITAL COURSE  This is a 24 y.o. female with asthma and IV methamphetamine use  here with flu-like symptoms.    She presented with more than a week of cough, dyspnea, and N/V.  On presentation she was tachycardic and hypoxic requiring 3 L/min. CBC indicated leukocytosis . Lactate normal. PCR negative for influenza, COVID, and RSV. CXR revealed mild hazy opacities of both lungs. AXR was without SBO. She was initiated on ceftriaxone / azithromycin for CAP coverage, which were discontinued when procalcitonin was negative as there is unlikely benefit of antibiotics. Recent HIV screening Ab was negative. She was administered methylprednisolone for asthma exacerbation. She rapidly weaned from oxygen and indicated eagerness to discharge home to recover. She was without wheezing nor tachypnea at time of discharge. She was prescribed a 5-day course of prednisone with 3-day course of azithromycin for likely viral exacerbation of asthma and viral pneumonia. She was prescribed antiemetics. Rescue and maintenance inhalers were refilled.    Therefore, she is discharged in fair and stable condition to home with close outpatient follow-up.    The patient recovered much more quickly than anticipated on admission.    Discharge Date  5/3/2025    FOLLOW UP ITEMS POST DISCHARGE    Asthma - 5-day course of prednisone with 3-day course of azithromycin    DISCHARGE DIAGNOSES  Principal Problem (Resolved):    Acute hypoxic respiratory failure (HCC) (POA: Yes)  Active Problems:    Intravenous drug abuse (HCC) (POA: Yes)      Overview: Alcohol, occasional Methamphetamine,     Leukocytosis (POA: Yes)    Viral pneumonia (POA: Yes)  Resolved Problems:    Asthma  exacerbation (POA: Yes)    Nausea & vomiting (POA: Yes)      FOLLOW UP  No future appointments.  No follow-up provider specified.    MEDICATIONS ON DISCHARGE     Medication List        START taking these medications        Instructions   azithromycin 500 MG tablet  Start taking on: May 4, 2025  Commonly known as: Zithromax   Take 1 Tablet by mouth every day.  Dose: 500 mg     ondansetron 4 MG Tbdp  Commonly known as: Zofran ODT   Take 1 Tablet by mouth every four hours as needed for Nausea/Vomiting (use FIRST for nausea and vomiting).  Dose: 4 mg     prochlorperazine 5 MG Tabs  Commonly known as: Compazine   Take 1 Tablet by mouth every 6 hours as needed for Nausea/Vomiting (use SECOND for nausea and vomiting).  Dose: 5 mg            CHANGE how you take these medications        Instructions   acetaminophen 500 MG Tabs  What changed:   medication strength  how much to take  reasons to take this  Commonly known as: Tylenol   Take 2 Tablets by mouth every 6 hours as needed for Mild Pain, Moderate Pain or Fever.  Dose: 1,000 mg     albuterol 108 (90 Base) MCG/ACT Aers inhalation aerosol  What changed: Another medication with the same name was removed. Continue taking this medication, and follow the directions you see here.   Inhale 2 Puffs every 6 hours as needed for Shortness of Breath.  Dose: 2 Puff            CONTINUE taking these medications        Instructions   fluticasone-salmeterol 250-50 MCG/ACT Aepb  Commonly known as: Advair   Inhale 1 Puff every 12 hours.  Dose: 1 Puff     predniSONE 20 MG Tabs  Commonly known as: Deltasone   Doctor's comments:    Take 2 Tablets by mouth every day for 4 days.  Dose: 40 mg            STOP taking these medications      benzonatate 100 MG Caps  Commonly known as: Tessalon     ipratropium-albuterol 0.5-2.5 (3) MG/3ML nebulizer solution  Commonly known as: Duoneb              Allergies  Allergies   Allergen Reactions    Pcn [Penicillins] Hives     Tolerates ceftriaxone and keflex     Pineapple        DIET  Orders Placed This Encounter   Procedures    Diet Order Diet: Regular     Standing Status:   Standing     Number of Occurrences:   1     Diet::   Regular [1]       ACTIVITY  As tolerated.  Weight bearing as tolerated    CONSULTATIONS  None    PROCEDURES  None    LABORATORY  Lab Results   Component Value Date    SODIUM 129 (L) 05/02/2025    POTASSIUM 4.3 05/02/2025    CHLORIDE 93 (L) 05/02/2025    CO2 23 05/02/2025    GLUCOSE 104 (H) 05/02/2025    BUN 13 05/02/2025    CREATININE 1.10 05/02/2025        Lab Results   Component Value Date    WBC 20.4 (H) 05/02/2025    HEMOGLOBIN 12.3 05/02/2025    HEMATOCRIT 37.3 05/02/2025    PLATELETCT 401 05/02/2025        Total time of the discharge process exceeds 32 minutes.

## 2025-05-03 NOTE — ASSESSMENT & PLAN NOTE
Leukocytosis greater than 20,000.  Likely worsening given the patient's usage of prednisone  Follow-up with daily CBC  Continue with IV antibiotics

## 2025-05-03 NOTE — ASSESSMENT & PLAN NOTE
Mild hazy opacities throughout both lungs, likely multifocal atypical pneumonia   Continue with IV abx

## 2025-05-03 NOTE — ED PROVIDER NOTES
ER Provider Note    Scribed for Harriett Wu M.D. by Kaylee Kurtz. 5/2/2025   10:58 PM    Primary Care Provider: Pcp Pt States None    CHIEF COMPLAINT  Chief Complaint   Patient presents with    Flu Like Symptoms    Blood in Vomit    Shortness of Breath     EXTERNAL RECORDS REVIEWED  Inpatient Notes Patient was admitted 4/7/2025 for three days for severe dyspnea. She was started on BiPAP and norepinephrine. She was admitted to the ICU on BIPAP.    HPI/ROS  LIMITATION TO HISTORY   Select: : None  OUTSIDE HISTORIAN(S):  None    Alecia Keller is a 24 y.o. female who presents to the ED for evaluation of shortness of breath onset two days ago. The patient reports that she has a history of asthma, with asthma exacerbation. She describes being seen in the ED two days ago for shortness of breath, cough, sore throat, nausea, and vomiting. She reports she was discharged following breathing treatment. She describes that since then these symptoms have worsened, and she started to have fevers, chills, and notice some blood in her vomit. She describes while coughing she feels a tightness to her chest. She reports having coffee ground emesis, two episodes, today. She also reports having difficulty breathing and a tight chest. She reports when she coughs it is productive of a green sputum. She adds having head pain when she coughs. She reports constipation about the last week. She describes one month ago she was admitted to the ICU for asthma exacerbation. She uses albuterol at home without alleviation. She describes a history of opoid use disorder and was recently detoxing while in the ICU. She has a prescription of Suboxone, but has not taken this since feeling ill. She does report heroin use. She denies known sick contacts. She denies recent surgeries.     PAST MEDICAL HISTORY  Past Medical History:   Diagnosis Date    Allergy     Anxiety     ASTHMA 11/3/2011    Asthma     Pleurisy 2008    Pneumonia 2008        SURGICAL HISTORY  None noted     FAMILY HISTORY  Family History   Problem Relation Age of Onset    Other Mother         liver disease    Diabetes Father     Hypertension Father     Cancer Maternal Grandfather         mult myeloma    Heart Disease Neg Hx     Hyperlipidemia Neg Hx     Stroke Neg Hx        SOCIAL HISTORY   reports that she has quit smoking. Her smoking use included cigarettes. She has a 0.5 pack-year smoking history. She has never used smokeless tobacco. She reports current alcohol use. She reports current drug use. Drugs: Marijuana, Methamphetamines, Cocaine, Intravenous, and Inhaled.    CURRENT MEDICATIONS  Previous Medications    ACETAMINOPHEN (TYLENOL) 325 MG TAB    Take 3 Tablets by mouth every 6 hours as needed for Mild Pain.    ALBUTEROL (ACCUNEB) 0.63 MG/3ML NEBULIZER SOLUTION    Take 3 mL by nebulization every four hours as needed for Shortness of Breath.    ALBUTEROL (PROVENTIL) 2.5MG/3ML NEBU SOLN SOLUTION FOR NEBULIZATION    Inhale 1 vial (3 mL) by nebulization every four hours as needed for Shortness of Breath.    ALBUTEROL 108 (90 BASE) MCG/ACT AERO SOLN INHALATION AEROSOL    Inhale 2 Puffs every 6 hours as needed for Shortness of Breath.    ALBUTEROL 108 (90 BASE) MCG/ACT AERO SOLN INHALATION AEROSOL    Inhale 2 Puffs every 6 hours as needed for Shortness of Breath.    BENZONATATE (TESSALON) 100 MG CAP    Take 1 Capsule by mouth 3 times a day as needed for Cough.    FLUTICASONE-SALMETEROL (ADVAIR) 250-50 MCG/ACT AEROSOL POWDER, BREATH ACTIVATED    Inhale 1 Puff every 12 hours.    IBUPROFEN (MOTRIN) 400 MG TAB    Take 1 Tablet by mouth every 6 hours as needed for Moderate Pain.    IPRATROPIUM-ALBUTEROL (DUONEB) 0.5-2.5 (3) MG/3ML NEBULIZER SOLUTION    Inhale 1 vial (3 mL) by nebulization every four hours as needed for Shortness of Breath.    PREDNISONE (DELTASONE) 20 MG TAB    Take 2 Tablets by mouth every day.       ALLERGIES  Allergies   Allergen Reactions    Pcn [Penicillins]  "Hives     Tolerates ceftriaxone and keflex    Pineapple         PHYSICAL EXAM   /69   Pulse (!) 120   Temp 37.1 °C (98.8 °F) (Temporal)   Resp 16   Ht 1.676 m (5' 6\")   Wt 54.4 kg (120 lb)   SpO2 96%   BMI 19.37 kg/m²    General: Pleasant appearing female sitting in bed in no acute distress  Head: Normocephalic atraumatic  Eyes: Extraocular motion intact  Neck: Supple, no rigidity  Cardiovascular:  Tachycardic, Regular rhythm no murmurs rubs or gallops  Respiratory: Wheezes heard bilaterally on auscultation, mild respiratory distress, crackles heard throughout. Equal chest rise and fall, no increased work of breathing  Abdomen: Soft nontender no guarding  Musculoskeletal: Warm and well perfused, no peripheral edema  Neuro: Alert, no focal deficits  Integumentary: No janeway lesions. No osler nodes. No splinter hemorrhages. No wounds or rashes     DIAGNOSTIC STUDIES    Labs:   Labs Reviewed   CBC WITH DIFFERENTIAL - Abnormal; Notable for the following components:       Result Value    WBC 20.4 (*)     Neutrophils-Polys 78.40 (*)     Lymphocytes 9.70 (*)     Immature Granulocytes 1.10 (*)     Neutrophils (Absolute) 15.98 (*)     Monos (Absolute) 1.50 (*)     Eos (Absolute) 0.58 (*)     Baso (Absolute) 0.13 (*)     Immature Granulocytes (abs) 0.22 (*)     All other components within normal limits   PROTHROMBIN TIME - Abnormal; Notable for the following components:    INR 1.14 (*)     All other components within normal limits   COMP METABOLIC PANEL - Abnormal; Notable for the following components:    Sodium 129 (*)     Chloride 93 (*)     Glucose 104 (*)     Alkaline Phosphatase 109 (*)     Globulin 4.0 (*)     All other components within normal limits   LACTIC ACID   BLOOD CULTURE   BLOOD CULTURE   COD (ADULT)   LIPASE   APTT   ESTIMATED GFR   ABO RH CONFIRM   PROCALCITONIN   URINALYSIS   URINE CULTURE(NEW)   POCT COV-2, FLU A/B, RSV BY PCR   POC COV-2, FLU A/B, RSV BY PCR   Labs notable for leukocytosis, " with left shift, relative hyponatremia, suspect acute phase reaction.  Viral swab negative.    EKG:   Results for orders placed or performed during the hospital encounter of 25   EKG   Result Value Ref Range    Report       [facility] of [facility] Emergency Dept.    Test Date:  2025  Pt Name:    WILLIAM MÉNDEZ              Department: ER  MRN:        [id number]                      Room:  Gender:     Female                       Technician: [id number]  :        2000                   Requested By:ER TRIAGE PROTOCOL  Order #:    [id number]                    Reading MD: Lauri Wu    Measurements  Intervals                                Axis  Rate:       129                          P:          79  OR:         150                          QRS:        42  QRSD:       82                           T:          24  QT:         281  QTc:        412    Interpretive Statements  Sinus tachycardia, rate of 129, normal axis, normal intervals.  No ST  elevations or depressions  Electronically Signed On 2025 07:20:14 PDT by Lauri Wu      I have independently interpreted this EKG as detailed above.     Radiology:   This attending emergency physician has independently interpreted the diagnostic imaging associated with this visit and is awaiting the final reading from the radiologist.   Preliminary interpretation is a follows: Evidence of hyperinflation on DX-Chest    Radiologist interpretation:   [id number] VIEW   Final Result         No specific finding to suggest small bowel obstruction.      DX-CHEST-PORTABLE (1 VIEW)   Final Result      Mild hazy opacities throughout both lungs, likely multifocal atypical pneumonia.           INITIAL ASSESSMENT COURSE AND PLAN  Care Narrative     10:58 PM - Patient was evaluated at bedside. They present for various flu like symptoms, history of asthma exacerbation, with her main concerns being shortness of breath, productive green sputum, coffee ground  emesis, and chest tightness while coughing.  Pertinent PE findings include: wheezes heard bilaterally on auscultation, mild respiratory distress, crackles heard throughout. No janeway lesions. No osler nodes. No splinter hemorrhages.Tachycardic. Differential diagnoses include but not limited to: Asthma exacerbation, Aspiration, Ileus, Opioid withdrawal, Endocarditis     12:35 AM - Patient reevaluated at bedside. Discussed lab and imaging results including evidence of pneumonia. Patient is continuing to desat on 2 L O2 and began vomiting again. Discussed plan of care for medication and admission for further care. Patient is deciding whether she agrees for admission, or will leave against medical advice.       Overall feel patient may have an aspiration pneumonia and subsequent asthma exacerbation in the setting of functional opioid-induced ileus.  She has a significant leukocytosis, with evidence of multifocal pneumonia on chest x-ray, that she will be covered for aspiration pneumonia.  Oxygen requirement significant improved witHnebulizer treatments from 5 L nasal cannula to 2 L nasal cannula.  Of note, patient is no longer on a maintenance inhaler unfortunately as I believe she would highly benefit from this in the future.    1:20 AM - Patient reports that she agrees with the plan of care and is comfortable with admission.     1:33 AM I discussed the patient's case and the above findings with Dr. Brooks  (Internal Medicine) who agrees to admit the patient for further care. Patient updated on this plan.         Hydration: Based on the patient's presentation of Acute Vomiting and Dehydration the patient was given IV fluids. IV Hydration was used because oral hydration was not adequate alone. Upon recheck following hydration, the patient was improved.  Severe sepsis not present.  No endorgan dysfunction.          DISPOSITION AND DISCUSSIONS    I have discussed management of the patient with the following physicians  and JW's:  Dr. Brooks  (Internal Medicine)     Discussion of management with other [facility] or appropriate source(s): None     Barriers to care at this time, including but not limited to: Patient does not have established PCP.     Decision tools and prescription drugs considered including, but not limited to: Antibiotics   .    DISPOSITION:  Patient will be hospitalized by Dr. Brooks  (Internal Medicine)  in guarded condition.    FINAL DIAGNOSIS  1. Acute respiratory failure with hypoxia (HCC)    2. Asthma with acute exacerbation, unspecified asthma severity, unspecified whether persistent    3. Nausea and vomiting, unspecified vomiting type       I, Kaylee Kurtz (Severino), am scribing for, and in the presence of, Lauri Wu M.D..    Electronically signed by: Kaylee Kurtz (Vickyibcheri), 5/2/2025    ILauri M.D. personally performed the services described in this documentation, as scribed by Kaylee Kurtz in my presence, and it is both accurate and complete.      The note accurately reflects work and decisions made by me.  Lauri Wu M.D.  5/3/2025  7:22 AM

## 2025-05-03 NOTE — DISCHARGE INSTRUCTIONS
Discharge Instructions per Nilesh Gold M.D.    You were treated at [facility] for low oxygen (hypoxia). This was due to a likely viral infection causing exacerbation of your asthma. You improved with steroids (prednisone) and antibiotic/anti-inflammatory (azithromycin).     Your inhalers have been refilled and you will be able to complete your recovery at home.    DIET: Regular    ACTIVITY: Regular    DIAGNOSIS: Acute Hypoxia due to Respiratory Viral Infection    Return to ER if you faint for any reason, develop sudden chest pain or shortness of breath, develop bleeding in your bowels or vomit.

## 2025-05-03 NOTE — ED NOTES
Pt had 500 mL bile colored emesis. Pt states she does not feel nauseous but that it just happened. ERP notified, new orders received.

## 2025-05-03 NOTE — ED TRIAGE NOTES
"Chief Complaint   Patient presents with    Flu Like Symptoms    Blood in Vomit    Shortness of Breath     Patient wheeled to triage for above. AAOx4, Appropriate precautions in place.     Patient states 2 days ago she was seen in the ED for shortness of breath, cough, sore throat, and nausea and vomiting. She states she was supposed to be admitted to the hospital \"for how hard my body was working to breathe,\" but left AMA. Her symptoms worsened and last night she started to have fevers, chills, and noticed blood in her vomit. States today her symptoms worsened and she had 2 episodes of \"coffee ground\" emesis. Patient states he has \"tight\" chest pain when she coughs. + green sputum production.     Patient room air O2 84%. She was placed on 5L NC. Room air baseline.     POCT CoV-2, Flu A/B, and RSV sample collected and running in [employer] machine.    Explained wait time and triage process. Placed in phlebotomy waiting area. Told to notify ED tech or RN of any changes, verbalized understanding.    /63   Pulse (!) 135   Temp 37.1 °C (98.8 °F) (Temporal)   Resp (!) 22   Ht 1.676 m (5' 6\")   Wt 54.4 kg (120 lb)   SpO2 93%   BMI 19.37 kg/m²     "

## 2025-05-03 NOTE — ED NOTES
Patient discharge per order. Oral and written discharge instructions reviewed. IV removed. Medications sent to [facility] pharmacy. New Medications reviewed. All belongings accounted for and taken with patient. Questions answered, and patient agrees with discharge plan. Encouraged to follow up with PCP.

## 2025-05-03 NOTE — ED NOTES
Pt turned down to 3L by ERP. Pt satting 92% on 3L while sitting. Pt ambulatory to BR with oxygen, upon returning pt initially satting 89% on 3L, then went up to 91%. Pt states she was unable to urinate, urine sample not obtained.

## 2025-05-03 NOTE — H&P
Hospital Medicine History & Physical Note    Date of Service  5/3/2025    Primary Care Physician  Pcp Pt States None      Code Status  Full Code    Chief Complaint  Chief Complaint   Patient presents with    Flu Like Symptoms    Blood in Vomit    Shortness of Breath       History of Presenting Illness  Alecia Keller is a 24 y.o. female who presented 5/2/2025 with flu and cold-like symptoms.  Patient has a past medical history of IV drug abuse as well as asthma.  She states that over the last 1 and half weeks, she has had persistent shortness of breath, and cough.  She did come to Emergency Department several days ago, but left AGAINST MEDICAL ADVICE because she had a court order.  She dates that over the last several days, she has had persistent nausea and vomiting.  Denies any abdominal pain.  She states that she last used IV drugs approximately 3 days ago, and is trying to remain abstinent.  She states that she continues to have persistent cough, as well as shortness of breath.  In our emergency department, she is wearing 3 L supplemental oxygen.  She does have a leukocytosis greater than 20,000.  Chest x-ray was obtained showing an atypical pneumonia.  Patient will be admitted for management of acute hypoxic respiratory failure    I discussed the plan of care with patient.    Review of Systems  Review of Systems   Constitutional:  Negative for chills and fever.   Eyes:  Negative for double vision, photophobia, pain and discharge.   Respiratory:  Positive for cough and shortness of breath. Negative for hemoptysis, sputum production and wheezing.    Cardiovascular:  Negative for chest pain, palpitations and orthopnea.   Gastrointestinal:  Positive for nausea and vomiting. Negative for abdominal pain and diarrhea.   Genitourinary:  Negative for dysuria, frequency, hematuria and urgency.   Musculoskeletal:  Negative for back pain and neck pain.   Neurological:  Negative for dizziness and headaches.   All  other systems reviewed and are negative.      Past Medical History   has a past medical history of Allergy, Anxiety, ASTHMA (11/3/2011), Asthma, Pleurisy (2008), and Pneumonia (2008).    Surgical History   has no past surgical history on file.     Family History  family history includes Cancer in her maternal grandfather; Diabetes in her father; Hypertension in her father; Other in her mother.   Family history reviewed with patient. There is no family history that is pertinent to the chief complaint.     Social History   reports that she has quit smoking. Her smoking use included cigarettes. She has a 0.5 pack-year smoking history. She has never used smokeless tobacco. She reports current alcohol use. She reports current drug use. Drugs: Marijuana, Methamphetamines, Cocaine, Intravenous, and Inhaled.    Allergies  Allergies   Allergen Reactions    Pcn [Penicillins] Hives     Tolerates ceftriaxone and keflex    Pineapple        Medications  Prior to Admission Medications   Prescriptions Last Dose Informant Patient Reported? Taking?   acetaminophen (TYLENOL) 325 MG Tab  Historical No No   Sig: Take 3 Tablets by mouth every 6 hours as needed for Mild Pain.   albuterol (ACCUNEB) 0.63 MG/3ML nebulizer solution  Historical No No   Sig: Take 3 mL by nebulization every four hours as needed for Shortness of Breath.   albuterol (PROVENTIL) 2.5mg/3ml Nebu Soln solution for nebulization   No No   Sig: Inhale 1 vial (3 mL) by nebulization every four hours as needed for Shortness of Breath.   albuterol 108 (90 Base) MCG/ACT Aero Soln inhalation aerosol   No No   Sig: Inhale 2 Puffs every 6 hours as needed for Shortness of Breath.   albuterol 108 (90 Base) MCG/ACT Aero Soln inhalation aerosol   No No   Sig: Inhale 2 Puffs every 6 hours as needed for Shortness of Breath.   benzonatate (TESSALON) 100 MG Cap   No No   Sig: Take 1 Capsule by mouth 3 times a day as needed for Cough.   fluticasone-salmeterol (ADVAIR) 250-50 MCG/ACT  AEROSOL POWDER, BREATH ACTIVATED  Historical No No   Sig: Inhale 1 Puff every 12 hours.   ibuprofen (MOTRIN) 400 MG Tab  Historical No No   Sig: Take 1 Tablet by mouth every 6 hours as needed for Moderate Pain.   ipratropium-albuterol (DUONEB) 0.5-2.5 (3) MG/3ML nebulizer solution   No No   Sig: Inhale 1 vial (3 mL) by nebulization every four hours as needed for Shortness of Breath.   predniSONE (DELTASONE) 20 MG Tab   No No   Sig: Take 2 Tablets by mouth every day.      Facility-Administered Medications: None       Physical Exam  Temp:  [37.1 °C (98.8 °F)] 37.1 °C (98.8 °F)  Pulse:  [] 97  Resp:  [12-22] 16  BP: (114-132)/(55-69) 132/60  SpO2:  [84 %-98 %] 95 %  Blood Pressure: 132/60   Temperature: 37.1 °C (98.8 °F)   Pulse: 97   Respiration: 16   Pulse Oximetry: 89 %       Physical Exam  Constitutional:       General: She is not in acute distress.     Appearance: Normal appearance. She is normal weight. She is not ill-appearing, toxic-appearing or diaphoretic.   HENT:      Head: Normocephalic and atraumatic.      Nose: Nose normal.      Mouth/Throat:      Mouth: Mucous membranes are moist.   Eyes:      Extraocular Movements: Extraocular movements intact.      Pupils: Pupils are equal, round, and reactive to light.   Cardiovascular:      Rate and Rhythm: Normal rate and regular rhythm.      Pulses: Normal pulses.      Heart sounds: Normal heart sounds. No murmur heard.     No friction rub. No gallop.   Pulmonary:      Effort: Pulmonary effort is normal. No respiratory distress.      Breath sounds: No stridor. Wheezing and rales present. No rhonchi.   Chest:      Chest wall: No tenderness.   Abdominal:      General: Abdomen is flat. There is no distension.      Palpations: Abdomen is soft. There is no mass.      Tenderness: There is no abdominal tenderness. There is no guarding or rebound.      Hernia: No hernia is present.   Musculoskeletal:         General: No swelling, tenderness, deformity or signs of  "injury.      Right lower leg: No edema.      Left lower leg: No edema.      Comments:      Skin:     General: Skin is warm and dry.      Capillary Refill: Capillary refill takes less than 2 seconds.      Coloration: Skin is not jaundiced or pale.      Findings: No bruising, erythema, lesion or rash.   Neurological:      General: No focal deficit present.      Mental Status: She is alert and oriented to person, place, and time. Mental status is at baseline.      Cranial Nerves: No cranial nerve deficit.      Sensory: No sensory deficit.      Motor: No weakness.      Coordination: Coordination normal.   Psychiatric:         Mood and Affect: Mood normal.         Behavior: Behavior normal.         Laboratory:  Recent Labs     05/02/25  2227   WBC 20.4*   RBC 4.47   HEMOGLOBIN 12.3   HEMATOCRIT 37.3   MCV 83.4   MCH 27.5   MCHC 33.0   RDW 43.5   PLATELETCT 401   MPV 9.8     Recent Labs     05/02/25  2227   SODIUM 129*   POTASSIUM 4.3   CHLORIDE 93*   CO2 23   GLUCOSE 104*   BUN 13   CREATININE 1.10   CALCIUM 9.1     Recent Labs     05/02/25  2227   ALTSGPT 18   ASTSGOT 17   ALKPHOSPHAT 109*   TBILIRUBIN 0.5   LIPASE 15   GLUCOSE 104*     Recent Labs     05/02/25  2227   APTT 32.4   INR 1.14*     No results for input(s): \"NTPROBNP\" in the last 72 hours.      No results for input(s): \"TROPONINT\" in the last 72 hours.    Imaging:  [id number] VIEW   Final Result         No specific finding to suggest small bowel obstruction.      DX-CHEST-PORTABLE (1 VIEW)   Final Result      Mild hazy opacities throughout both lungs, likely multifocal atypical pneumonia.          X-Ray:  I have personally reviewed the images and compared with prior images.  EKG:  I have personally reviewed the images and compared with prior images.    Assessment/Plan:  Justification for Admission Status  I anticipate this patient will require at least two midnights for appropriate medical management, necessitating inpatient admission because with acute " hypoxic respiratory failure.    Patient will need a Telemetry bed on MEDICAL service .  The need is secondary to acute hypoxic respiratory failure   * Acute hypoxic respiratory failure (HCC)- (present on admission)  Assessment & Plan  CXR: Mild hazy opacities throughout both lungs, likely multifocal atypical pneumonia.   Patient currently requiring 2 to 3 L supplemental oxygen  Started patient on IV Unasyn and azithromycin  Follow-up sputum cultures  Continue to titrate off oxygen  Respiratory therapy consult  Albuterol DuoNebs as needed  Started patient on prednisone 40 mg daily    Pneumonia  Assessment & Plan  Mild hazy opacities throughout both lungs, likely multifocal atypical pneumonia   Continue with IV abx     Asthma exacerbation- (present on admission)  Assessment & Plan  Albuterol DuoNebs as needed  Prednisone 40 mg daily  Respiratory therapy consult      Nausea & vomiting  Assessment & Plan  Continue with IV and p.o. antiemetics  Nausea vomiting likely secondary to detox from IV drug use.    Leukocytosis- (present on admission)  Assessment & Plan  Leukocytosis greater than 20,000.  Likely worsening given the patient's usage of prednisone  Follow-up with daily CBC  Continue with IV antibiotics    Intravenous drug abuse (HCC)- (present on admission)  Assessment & Plan  Patient with history of intravenous drug abuse  States that she last used 3 days ago, and would like to quit  She feels like she is out of the window from detox.  I did have a discussion with the patient and informed her to let staff know if she feels like she is withdrawing  Prior to discharge, patient will likely benefit from resources to maintain sobriety        VTE prophylaxis: enoxaparin ppx

## 2025-05-03 NOTE — ASSESSMENT & PLAN NOTE
Patient with history of intravenous drug abuse  States that she last used 3 days ago, and would like to quit  She feels like she is out of the window from detox.  I did have a discussion with the patient and informed her to let staff know if she feels like she is withdrawing  Prior to discharge, patient will likely benefit from resources to maintain sobriety

## 2025-05-03 NOTE — ED NOTES
Med Rec completed per patient      Allergies reviewed: yes     Oral antibiotics in the past 30 days: no    Anticoagulant in past 14 days: no    Dispense history available in EPIC: yes     Pharmacy patient utilizes: [facility]

## 2025-05-03 NOTE — ED NOTES
Bedside report received from off going RN/tech: Indira RN, assumed care of patient.  POC discussed with patient. Call light within reach, all needs addressed at this time.       Fall risk interventions in place: Patient's personal possessions are with in their safe reach, Place socks on patient, Place fall risk sign on patient's door, and Keep floor surfaces clean and dry (all applicable per [place] Fall risk assessment)   Continuous monitoring: Cardiac Leads, Pulse Ox, or Blood Pressure  IVF/IV medications: Not Applicable   Oxygen: How many liters 4L NC  Bedside sitter: Not Applicable   Isolation: Not Applicable        Medical Assistant's notes reviewed and appreciated.  Here with his brother    Mr. Grider comes in today for multiple problems:    1. Recheck Type 2 DM -- pt doesn't check his blood sugars  2. Recheck Hyperlipidemia -- denies myalgias.   3. Recheck spasticity -- uses baclofen and meloxicam daily and he says these help  4. Recheck constipation -- miralax has helped    Problem list, PMH, med list and allergies reviewed.      SH:   Social History   Substance Use Topics   • Smoking status: Never Smoker   • Smokeless tobacco: Never Used   • Alcohol use Yes      Comment: beer occassionally         PHYSICAL EXAMINATION:  CONSTITUTIONAL: The patient appears comfortable, nontoxic, and in no apparent distress. Wheelchair bound.   Visit Vitals   • /74 (BP Location: [facility], Cuff Size: Large Adult)   • Pulse 79   • Resp 16     RESPIRATORY: Respiratory effort was unlabored.  Lungs are clear to auscultation without rales, wheezes, or rhonchi.   CARDIAC: Regular rate and rhythm without S3, S4.    ABDOMEN: Soft and nontender.  No hepatomegaly  MUSCULOSKELETAL: trace edema to knees bilaterally    ASSESSMENT/PLAN:  1. Hyperlipidemia -- last LDL was good.  Continue on same dose of atorvastatin  2. Type 2 DM --  Check A1c.  Continue on metformin and glimepiride  3. spasticity -- doing well with  baclofen and meloxicam as needed   4. Constipation -- doing well with miralax    He will call immediately in the meantime with any questions, concerns, or problems.    Disposition/follow-up as below

## 2025-05-03 NOTE — ED NOTES
Bedside report received from off going RN/tech: Brittany TIMMONS, assumed care of patient.  POC discussed with patient. Call light within reach, all needs addressed at this time.       Fall risk interventions in place: Place socks on patient, Place fall risk sign on patient's door, Keep floor surfaces clean and dry, and Accompanied to restroom (all applicable per [place] Fall risk assessment)   Continuous monitoring: Cardiac Leads, Pulse Ox, or Blood Pressure  IVF/IV medications: Infusion per MAR (List Med(s)) Zithromax  Oxygen: How many liters 4L  Bedside sitter: Not Applicable   Isolation: Not Applicable

## 2025-05-03 NOTE — ASSESSMENT & PLAN NOTE
CXR: Mild hazy opacities throughout both lungs, likely multifocal atypical pneumonia.   Patient currently requiring 2 to 3 L supplemental oxygen  Started patient on IV Unasyn and azithromycin  Follow-up sputum cultures  Continue to titrate off oxygen  Respiratory therapy consult  Albuterol DuoNebs as needed  Started patient on prednisone 40 mg daily

## 2025-05-03 NOTE — ASSESSMENT & PLAN NOTE
Continue with IV and p.o. antiemetics  Nausea vomiting likely secondary to detox from IV drug use.

## 2025-05-04 LAB
BACTERIA BLD CULT: NORMAL
SIGNIFICANT IND 70042: NORMAL
SITE SITE: NORMAL
SOURCE SOURCE: NORMAL

## 2025-05-08 ENCOUNTER — HOSPITAL ENCOUNTER (EMERGENCY)
Facility: MEDICAL CENTER | Age: 25
End: 2025-05-09
Attending: EMERGENCY MEDICINE
Payer: COMMERCIAL

## 2025-05-08 ENCOUNTER — APPOINTMENT (OUTPATIENT)
Dept: RADIOLOGY | Facility: MEDICAL CENTER | Age: 25
End: 2025-05-08
Attending: EMERGENCY MEDICINE
Payer: COMMERCIAL

## 2025-05-08 DIAGNOSIS — F11.11 HISTORY OF HEROIN ABUSE (HCC): ICD-10-CM

## 2025-05-08 DIAGNOSIS — T78.40XA ACUTE ALLERGIC REACTION, INITIAL ENCOUNTER: ICD-10-CM

## 2025-05-08 DIAGNOSIS — F19.10 IV DRUG ABUSE (HCC): ICD-10-CM

## 2025-05-08 DIAGNOSIS — J45.901 SEVERE ASTHMA WITH ACUTE EXACERBATION, UNSPECIFIED WHETHER PERSISTENT: ICD-10-CM

## 2025-05-08 DIAGNOSIS — D72.828 OTHER ELEVATED WHITE BLOOD CELL (WBC) COUNT: ICD-10-CM

## 2025-05-08 LAB
ALBUMIN SERPL BCP-MCNC: 3.8 G/DL (ref 3.2–4.9)
ALBUMIN/GLOB SERPL: 1 G/DL
ALP SERPL-CCNC: 104 U/L (ref 30–99)
ALT SERPL-CCNC: 23 U/L (ref 2–50)
ANION GAP SERPL CALC-SCNC: 17 MMOL/L (ref 7–16)
AST SERPL-CCNC: 29 U/L (ref 12–45)
BACTERIA BLD CULT: NORMAL
BACTERIA BLD CULT: NORMAL
BASOPHILS # BLD AUTO: 0 % (ref 0–1.8)
BASOPHILS # BLD: 0 K/UL (ref 0–0.12)
BILIRUB SERPL-MCNC: 0.2 MG/DL (ref 0.1–1.5)
BUN SERPL-MCNC: 15 MG/DL (ref 8–22)
BURR CELLS BLD QL SMEAR: NORMAL
CALCIUM ALBUM COR SERPL-MCNC: 9.6 MG/DL (ref 8.5–10.5)
CALCIUM SERPL-MCNC: 9.4 MG/DL (ref 8.5–10.5)
CHLORIDE SERPL-SCNC: 101 MMOL/L (ref 96–112)
CO2 SERPL-SCNC: 22 MMOL/L (ref 20–33)
COMMENT NL1176: NORMAL
CREAT SERPL-MCNC: 1.13 MG/DL (ref 0.5–1.4)
EOSINOPHIL # BLD AUTO: 0.53 K/UL (ref 0–0.51)
EOSINOPHIL NFR BLD: 2.7 % (ref 0–6.9)
ERYTHROCYTE [DISTWIDTH] IN BLOOD BY AUTOMATED COUNT: 45.4 FL (ref 35.9–50)
GFR SERPLBLD CREATININE-BSD FMLA CKD-EPI: 69 ML/MIN/1.73 M 2
GLOBULIN SER CALC-MCNC: 3.7 G/DL (ref 1.9–3.5)
GLUCOSE SERPL-MCNC: 152 MG/DL (ref 65–99)
HCG SERPL QL: NEGATIVE
HCT VFR BLD AUTO: 44.8 % (ref 37–47)
HGB BLD-MCNC: 14.5 G/DL (ref 12–16)
LYMPHOCYTES # BLD AUTO: 8.52 K/UL (ref 1–4.8)
LYMPHOCYTES NFR BLD: 43.7 % (ref 22–41)
MANUAL DIFF BLD: NORMAL
MCH RBC QN AUTO: 27.8 PG (ref 27–33)
MCHC RBC AUTO-ENTMCNC: 32.4 G/DL (ref 32.2–35.5)
MCV RBC AUTO: 86 FL (ref 81.4–97.8)
METAMYELOCYTES NFR BLD MANUAL: 1.8 %
MONOCYTES # BLD AUTO: 0.2 K/UL (ref 0–0.85)
MONOCYTES NFR BLD AUTO: 0.9 % (ref 0–13.4)
MORPHOLOGY BLD-IMP: NORMAL
MYELOCYTES NFR BLD MANUAL: 5.4 %
NEUTROPHILS # BLD AUTO: 8.87 K/UL (ref 1.82–7.42)
NEUTROPHILS NFR BLD: 45.5 % (ref 44–72)
NRBC # BLD AUTO: 0 K/UL
NRBC BLD-RTO: 0 /100 WBC (ref 0–0.2)
PLATELET # BLD AUTO: 681 K/UL (ref 164–446)
PLATELET BLD QL SMEAR: NORMAL
PMV BLD AUTO: 9.6 FL (ref 9–12.9)
POIKILOCYTOSIS BLD QL SMEAR: NORMAL
POTASSIUM SERPL-SCNC: 4.4 MMOL/L (ref 3.6–5.5)
PROT SERPL-MCNC: 7.5 G/DL (ref 6–8.2)
RBC # BLD AUTO: 5.21 M/UL (ref 4.2–5.4)
RBC BLD AUTO: PRESENT
SIGNIFICANT IND 70042: NORMAL
SIGNIFICANT IND 70042: NORMAL
SITE SITE: NORMAL
SITE SITE: NORMAL
SMUDGE CELLS BLD QL SMEAR: NORMAL
SODIUM SERPL-SCNC: 140 MMOL/L (ref 135–145)
SOURCE SOURCE: NORMAL
SOURCE SOURCE: NORMAL
WBC # BLD AUTO: 19.5 K/UL (ref 4.8–10.8)

## 2025-05-08 PROCEDURE — 71045 X-RAY EXAM CHEST 1 VIEW: CPT

## 2025-05-08 PROCEDURE — 85027 COMPLETE CBC AUTOMATED: CPT

## 2025-05-08 PROCEDURE — 84703 CHORIONIC GONADOTROPIN ASSAY: CPT

## 2025-05-08 PROCEDURE — 700111 HCHG RX REV CODE 636 W/ 250 OVERRIDE (IP): Mod: JZ,UD | Performed by: EMERGENCY MEDICINE

## 2025-05-08 PROCEDURE — 80053 COMPREHEN METABOLIC PANEL: CPT

## 2025-05-08 PROCEDURE — 36415 COLL VENOUS BLD VENIPUNCTURE: CPT

## 2025-05-08 PROCEDURE — 85007 BL SMEAR W/DIFF WBC COUNT: CPT

## 2025-05-08 PROCEDURE — 96374 THER/PROPH/DIAG INJ IV PUSH: CPT

## 2025-05-08 PROCEDURE — 99285 EMERGENCY DEPT VISIT HI MDM: CPT

## 2025-05-08 PROCEDURE — 96375 TX/PRO/DX INJ NEW DRUG ADDON: CPT

## 2025-05-08 PROCEDURE — 700101 HCHG RX REV CODE 250: Mod: UD | Performed by: EMERGENCY MEDICINE

## 2025-05-08 RX ORDER — ALBUTEROL SULFATE 5 MG/ML
7.5 SOLUTION RESPIRATORY (INHALATION)
Status: COMPLETED | OUTPATIENT
Start: 2025-05-08 | End: 2025-05-08

## 2025-05-08 RX ORDER — NALOXONE HYDROCHLORIDE 0.4 MG/ML
INJECTION, SOLUTION INTRAMUSCULAR; INTRAVENOUS; SUBCUTANEOUS
Status: DISCONTINUED
Start: 2025-05-08 | End: 2025-05-09 | Stop reason: HOSPADM

## 2025-05-08 RX ORDER — METHYLPREDNISOLONE SODIUM SUCCINATE 125 MG/2ML
125 INJECTION, POWDER, LYOPHILIZED, FOR SOLUTION INTRAMUSCULAR; INTRAVENOUS ONCE
Status: COMPLETED | OUTPATIENT
Start: 2025-05-08 | End: 2025-05-08

## 2025-05-08 RX ORDER — DIPHENHYDRAMINE HYDROCHLORIDE 50 MG/ML
50 INJECTION, SOLUTION INTRAMUSCULAR; INTRAVENOUS ONCE
Status: COMPLETED | OUTPATIENT
Start: 2025-05-08 | End: 2025-05-08

## 2025-05-08 RX ADMIN — ALBUTEROL SULFATE 7.5 MG: 2.5 SOLUTION RESPIRATORY (INHALATION) at 22:06

## 2025-05-08 RX ADMIN — METHYLPREDNISOLONE SODIUM SUCCINATE 125 MG: 125 INJECTION, POWDER, FOR SOLUTION INTRAMUSCULAR; INTRAVENOUS at 21:39

## 2025-05-08 RX ADMIN — FAMOTIDINE 20 MG: 10 INJECTION, SOLUTION INTRAVENOUS at 21:40

## 2025-05-08 RX ADMIN — DIPHENHYDRAMINE HYDROCHLORIDE 50 MG: 50 INJECTION, SOLUTION INTRAMUSCULAR; INTRAVENOUS at 21:39

## 2025-05-08 ASSESSMENT — FIBROSIS 4 INDEX: FIB4 SCORE: 0.25

## 2025-05-09 ENCOUNTER — PHARMACY VISIT (OUTPATIENT)
Dept: PHARMACY | Facility: MEDICAL CENTER | Age: 25
End: 2025-05-09
Payer: MEDICARE

## 2025-05-09 VITALS
HEART RATE: 93 BPM | WEIGHT: 119.05 LBS | OXYGEN SATURATION: 99 % | SYSTOLIC BLOOD PRESSURE: 111 MMHG | BODY MASS INDEX: 19.13 KG/M2 | DIASTOLIC BLOOD PRESSURE: 78 MMHG | HEIGHT: 66 IN | RESPIRATION RATE: 16 BRPM | TEMPERATURE: 98 F

## 2025-05-09 RX ORDER — DIPHENHYDRAMINE HCL 25 MG
25 CAPSULE ORAL EVERY 6 HOURS PRN
Qty: 30 CAPSULE | Refills: 0 | Status: SHIPPED | OUTPATIENT
Start: 2025-05-08

## 2025-05-09 RX ORDER — METHYLPREDNISOLONE 4 MG/1
TABLET ORAL
Qty: 21 TABLET | Refills: 0 | Status: SHIPPED | OUTPATIENT
Start: 2025-05-08

## 2025-05-09 RX ORDER — FAMOTIDINE 20 MG/1
20 TABLET, FILM COATED ORAL DAILY
Qty: 7 TABLET | Refills: 0 | Status: SHIPPED | OUTPATIENT
Start: 2025-05-08

## 2025-05-09 NOTE — ED NOTES
Patient comfortably sitting on bed; no complaints made at the moment; on oxygen at 5L via nasal cannula; attached to monitor

## 2025-05-09 NOTE — ED PROVIDER NOTES
ER Provider Note    Scribed for Dr. Kalyn Franco D.O. by Meka Sahni. 5/8/2025  9:37 PM    Primary Care Provider: Pcp Pt States None    CHIEF COMPLAINT  Chief Complaint   Patient presents with    Drug Ingestion    Shortness of Breath       EXTERNAL RECORDS REVIEWED  Patient was just recently admitted into the hospital on 5/2/2025 for respiratory failure with hypoxia pneumonia asthma exacerbation she was discharged on 5/3/2025    HPI/ROS  LIMITATION TO HISTORY   Select: : None    OUTSIDE HISTORIAN(S):  Friend provided partial history in regards to drug use    Alecia Keller is a 24 y.o. female who presents to the ED for evaluation of acute IV drug ingestion onset 1 hour ago. Patient reports she injected unknown substance prior to arrival and immediately experienced shortness of breath, rash, difficulty swallowing which prompted her to present to the ED. Per male friend at bedside, patient injected something else other than heroin and witnessed patient have sudden itchiness to the injection site. In the ED, patient arrived tachycardic in the 140's.She presents associated symptoms of chills and chest tightness. Denies chest pain. Patient has additional history of Asthma and a recent hospitalization for Pneumonia 1 week ago.      PAST MEDICAL HISTORY  Past Medical History:   Diagnosis Date    Allergy     Anxiety     ASTHMA 11/3/2011    Asthma     Pleurisy 2008    Pneumonia 2008       SURGICAL HISTORY  No past surgical history noted    FAMILY HISTORY  Family History   Problem Relation Age of Onset    Other Mother         liver disease    Diabetes Father     Hypertension Father     Cancer Maternal Grandfather         mult myeloma    Heart Disease Neg Hx     Hyperlipidemia Neg Hx     Stroke Neg Hx        SOCIAL HISTORY   reports that she has quit smoking. Her smoking use included cigarettes. She has a 0.5 pack-year smoking history. She has never used smokeless tobacco. She reports current alcohol use. She  "reports current drug use. Drugs: Marijuana, Methamphetamines, Cocaine, Intravenous, and Inhaled.    CURRENT MEDICATIONS  Current Outpatient Medications   Medication Instructions    acetaminophen (TYLENOL) 1,000 mg, Oral, EVERY 6 HOURS PRN    albuterol 108 (90 Base) MCG/ACT Aero Soln inhalation aerosol 2 Puffs, Inhalation, EVERY 6 HOURS PRN    azithromycin (ZITHROMAX) 500 mg, Oral, DAILY    fluticasone-salmeterol (ADVAIR) 250-50 MCG/ACT AEROSOL POWDER, BREATH ACTIVATED 1 Puff, Inhalation, EVERY 12 HOURS    ondansetron (ZOFRAN ODT) 4 MG TABLET DISPERSIBLE Dissolve 1 Tablet by mouth every four hours as needed for Nausea/Vomiting (use FIRST for nausea and vomiting).    prochlorperazine (COMPAZINE) 5 mg, Oral, EVERY 6 HOURS PRN        ALLERGIES  Pcn [penicillins] and Pineapple    PHYSICAL EXAM  BP (!) 149/77   Pulse (!) 129   Temp 36.7 °C (98 °F)   Resp (!) 35   Ht 1.676 m (5' 6\")   Wt 54 kg (119 lb 0.8 oz)   SpO2 (!) 86%   BMI 19.21 kg/m²   Constitutional: Patient is a very thin female in moderate distress from her allergic reaction.  Her skin is very red particular in her facial area and anterior chest.  HENT: Normocephalic, nares are patent and clear, oral mucosa is moist, tongue and uvular midline, no uvular edema.  Cardiovascular: Tachycardic heart rate and Regular rhythm. No murmur,   Thorax & Lungs: Diminished breath sounds with tight expiratory wheezes, no rhonchi. Severe respiratory distress  Abdomen: Bowel sounds normal in all four quadrants. Soft,nontender, no rebound , guarding, palpable masses.   Skin: Warm, Dry, Erythematous   Extremities: Peripheral pulses 4/4 No edema, No tenderness,    Musculoskeletal: Normal range of motion in all major joints. No tenderness to palpation or major deformities noted.  Neurologic: Alert & oriented x 3, Normal motor function, Normal sensory function,   Psychiatric: Affect very anxious, judgment is impaired due to drugs.    DIAGNOSTIC STUDIES & PROCEDURES    Labs: "   Results for orders placed or performed during the hospital encounter of 05/08/25   CBC WITH DIFFERENTIAL    Collection Time: 05/08/25  9:30 PM   Result Value Ref Range    WBC 19.5 (H) 4.8 - 10.8 K/uL    RBC 5.21 4.20 - 5.40 M/uL    Hemoglobin 14.5 12.0 - 16.0 g/dL    Hematocrit 44.8 37.0 - 47.0 %    MCV 86.0 81.4 - 97.8 fL    MCH 27.8 27.0 - 33.0 pg    MCHC 32.4 32.2 - 35.5 g/dL    RDW 45.4 35.9 - 50.0 fL    Platelet Count 681 (H) 164 - 446 K/uL    MPV 9.6 9.0 - 12.9 fL    Neutrophils-Polys 45.50 44.00 - 72.00 %    Lymphocytes 43.70 (H) 22.00 - 41.00 %    Monocytes 0.90 0.00 - 13.40 %    Eosinophils 2.70 0.00 - 6.90 %    Basophils 0.00 0.00 - 1.80 %    Nucleated RBC 0.00 0.00 - 0.20 /100 WBC    Neutrophils (Absolute) 8.87 (H) 1.82 - 7.42 K/uL    Lymphs (Absolute) 8.52 (H) 1.00 - 4.80 K/uL    Monos (Absolute) 0.20 0.00 - 0.85 K/uL    Eos (Absolute) 0.53 (H) 0.00 - 0.51 K/uL    Baso (Absolute) 0.00 0.00 - 0.12 K/uL    NRBC (Absolute) 0.00 K/uL   CMP    Collection Time: 05/08/25  9:30 PM   Result Value Ref Range    Sodium 140 135 - 145 mmol/L    Potassium 4.4 3.6 - 5.5 mmol/L    Chloride 101 96 - 112 mmol/L    Co2 22 20 - 33 mmol/L    Anion Gap 17.0 (H) 7.0 - 16.0    Glucose 152 (H) 65 - 99 mg/dL    Bun 15 8 - 22 mg/dL    Creatinine 1.13 0.50 - 1.40 mg/dL    Calcium 9.4 8.5 - 10.5 mg/dL    Correct Calcium 9.6 8.5 - 10.5 mg/dL    AST(SGOT) 29 12 - 45 U/L    ALT(SGPT) 23 2 - 50 U/L    Alkaline Phosphatase 104 (H) 30 - 99 U/L    Total Bilirubin 0.2 0.1 - 1.5 mg/dL    Albumin 3.8 3.2 - 4.9 g/dL    Total Protein 7.5 6.0 - 8.2 g/dL    Globulin 3.7 (H) 1.9 - 3.5 g/dL    A-G Ratio 1.0 g/dL   HCG QUAL SERUM    Collection Time: 05/08/25  9:30 PM   Result Value Ref Range    Beta-Hcg Qualitative Serum Negative Negative   DIFFERENTIAL MANUAL    Collection Time: 05/08/25  9:30 PM   Result Value Ref Range    Metamyelocytes 1.80 %    Myelocytes 5.40 %    Manual Diff Status PERFORMED     Comment See Comment    PERIPHERAL SMEAR REVIEW     Collection Time: 05/08/25  9:30 PM   Result Value Ref Range    Peripheral Smear Review see below    PLATELET ESTIMATE    Collection Time: 05/08/25  9:30 PM   Result Value Ref Range    Plt Estimation Increased    MORPHOLOGY    Collection Time: 05/08/25  9:30 PM   Result Value Ref Range    RBC Morphology Present     Poikilocytosis 2+     Echinocytes 3+     Smudge Cells Few    ESTIMATED GFR    Collection Time: 05/08/25  9:30 PM   Result Value Ref Range    GFR (CKD-EPI) 69 >60 mL/min/1.73 m 2      All labs reviewed by me.      Radiology:   The attending Emergency Physician has independently interpreted the diagnostic imaging associated with this visit and is awaiting the final reading from the radiologist, which will be displayed below.    Preliminary interpretation is a follows: No acute cardiopulmonary disease  Radiologist interpretation:    DX-CHEST-PORTABLE (1 VIEW)   Final Result         1.  No acute cardiopulmonary disease.           COURSE & MEDICAL DECISION MAKING    INITIAL ASSESSMENT AND PLAN  Care Narrative:       9:37 PM - Patient seen and evaluated at bedside. Discussed plan of care, including stablizing patient's vitals. Patient agrees to plan of care. Patient will be treated with Benadryl 50 mg, Solu-medrol 50 mg, Proventil 7.5 mg and Pepcid 20 mg for her symptoms. Ordered DX-chest, HCG qual., CBC with diff., CMP, urine drug screen, differential manual, peripheral smear review, platelet estimate, morphology, and estimated GFR to evaluate. Differential diagnoses include but are not limited to: accidental overdose, drug overdose, asthma exacerbation    Patient responded well to all of the medications that were given to her.,  Vital signs normalized she was awake alert and oriented speaking full sentences, the rash was gone and her lungs were completely clear to auscultation.  She will be given prescriptions for home, she is to stop using drugs and seek some help for this.  Return if any problems or  worsening  Her laboratories revealed an elevated white blood cell count of 19.5 with no significant left shift.  I feel her WBCs are elevated secondary to stress demargination as she is afebrile and does not appear to be septic.  I have instructed her to return if she has elevated fever or worsening breathing over the next 12 to 24 hours.    11:32 PM Patient was reevaluated at bedside. She states that she is feeling improved after medication administration. Patient states that she is getting into a program to help with her drug use, she states that she tried to be careful about preventing overdoses. Advised patient to avoid further drug use and to seek counseling. I discussed plan for discharge and follow up as outlined below. The patient is stable for discharge at this time and will return for any new or worsening symptoms. Patient verbalizes understanding and support with my plan for discharge.                     DISPOSITION AND DISCUSSIONS  I have discussed management of the patient with the following physicians and JW's: None     Discussion of management with other HP or appropriate source(s): None    Barriers to care at this time, including but not limited to: Patient does not have established PCP.     Decision tools and prescription drugs considered including, but not limited to:  Antihistamines, Pepcid, steroids .    The patient will return for new or worsening symptoms and is stable at the time of discharge.    The patient is referred to a primary physician for blood pressure management, diabetic screening, and for all other preventative health concerns.    DISPOSITION:  Patient will be discharged home in stable condition.    FOLLOW UP:  [facility]  [address] [place] [id number]  [phone]  Schedule an appointment as soon as possible for a visit in 2 days  For recheck      OUTPATIENT MEDICATIONS:  New Prescriptions    DIPHENHYDRAMINE (BENADRYL) 25 MG CAPSULE    Take 1 Capsule by  mouth every 6 hours as needed for Itching or Rash.    FAMOTIDINE (PEPCID) 20 MG TAB    Take 1 Tablet by mouth every day.    METHYLPREDNISOLONE (MEDROL DOSEPAK) 4 MG TABLET THERAPY PACK    As directed with food        FINAL IMPRESSION   1. Other elevated white blood cell (WBC) count    2. IV drug abuse (HCC)    3. Acute allergic reaction, initial encounter    4. History of heroin abuse ([facility])    5. Severe asthma with acute exacerbation, unspecified whether persistent         Meka CURTIS (Vickyibcheri), am scribing for, and in the presence of, Kalyn Franco D.O..    Electronically signed by: Meka Sahni (Vickyibe), 5/8/2025    IKalyn D.O. personally performed the services described in this documentation, as scribed by Meka Sahni in my presence, and it is both accurate and complete.    The note accurately reflects work and decisions made by me.  Kalyn Franco D.O.  5/9/2025  3:31 AM

## 2025-05-09 NOTE — DISCHARGE INSTRUCTIONS
Take the medications I am prescribing you as directed with food prevent recurrent allergic reaction.  Use your inhaler every 6 hours as needed  Please stop using drugs or you may die!!!  Get some help with soon as possible.  If you start to experience any fever, productive cough worsening breathing please return to the ER at this time your chest x-ray was negative so there was no signs of pneumonia.  Follow-up with [facility] for recheck within the next 2 to 3 days.

## 2025-05-09 NOTE — ED TRIAGE NOTES
"Chief Complaint   Patient presents with    Drug Ingestion    Shortness of Breath      Brought from triage; patient had Herion inj few mins ago and had a a sudden SOB, was brought by friend. Patient sating 68% on room in triage- to red 5 via WC with RN    Ht 1.676 m (5' 6\")   Wt 54 kg (119 lb 0.8 oz)   BMI 19.21 kg/m²       Aox4 GCS15; patient was seen few days ago with pneumonia    Friend at bedside states;patient ran out of heroin and injected something else and had sudden itchiness with SOB; h/o Asthma  "

## 2025-05-27 ENCOUNTER — HOSPITAL ENCOUNTER (EMERGENCY)
Facility: MEDICAL CENTER | Age: 25
End: 2025-05-27
Attending: EMERGENCY MEDICINE
Payer: COMMERCIAL

## 2025-05-27 VITALS
BODY MASS INDEX: 19.29 KG/M2 | DIASTOLIC BLOOD PRESSURE: 86 MMHG | SYSTOLIC BLOOD PRESSURE: 132 MMHG | RESPIRATION RATE: 17 BRPM | HEART RATE: 100 BPM | TEMPERATURE: 98.2 F | WEIGHT: 120 LBS | HEIGHT: 66 IN | OXYGEN SATURATION: 94 %

## 2025-05-27 DIAGNOSIS — J45.909 ASTHMA, UNSPECIFIED ASTHMA SEVERITY, UNSPECIFIED WHETHER COMPLICATED, UNSPECIFIED WHETHER PERSISTENT: ICD-10-CM
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
## 2025-05-27 DIAGNOSIS — S06.0X0A CONCUSSION WITHOUT LOSS OF CONSCIOUSNESS, INITIAL ENCOUNTER: ICD-10-CM

## 2025-05-27 DIAGNOSIS — Z00.8 MEDICAL CLEARANCE FOR INCARCERATION: ICD-10-CM

## 2025-05-27 PROCEDURE — 305948 HCHG GREEN TRAUMA ACT PRE-NOTIFY NO CC

## 2025-05-27 PROCEDURE — 99284 EMERGENCY DEPT VISIT MOD MDM: CPT

## 2025-05-27 RX ORDER — BUDESONIDE AND FORMOTEROL FUMARATE DIHYDRATE 80; 4.5 UG/1; UG/1
AEROSOL RESPIRATORY (INHALATION)
Qty: 10.2 G | Refills: 3 | Status: SHIPPED | OUTPATIENT
Start: 2025-05-27

## 2025-05-27 ASSESSMENT — PAIN DESCRIPTION - PAIN TYPE: TYPE: ACUTE PAIN
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
## 2025-05-28 NOTE — DISCHARGE INSTRUCTIONS
You have been diagnosed with a concussion, a type of brain injury caused by a sudden impact to your head. It can also be caused by sudden movement of your brain inside your head, such as from forceful shaking. Some concussions are mild. Most people recover completely from mild concussions. But recovery may take days, weeks, or months. For some, symptoms may last even longer. Early care and monitoring are important to prevent long-term complications. You must be cleared by a physician to return to sports.     You have been prescribed Symbicort for your asthma.  Use this daily to prevent worsening of your asthma and if you have a flare you may use it multiple times daily.    Home care    Do's and don'ts:   Ask a friend or family member to stay with you for a few days. You should not be alone until you know how the injury has affected you. Your caregiver should call 911 if he or she can't wake you, or if you are confused.  If you have a headache, try placing a cold, damp cloth on your forehead. You may take Tylenol (acetaminophen)  Don't drink alcohol or use any recreational drugs.   Don't return to sports or any activity that could cause you to hit your head until all symptoms are gone and you have been cleared by your doctor. A second head injury before full recovery from the first one can lead to serious brain injury.  Avoid activities that require a lot of concentration or attention. This will allow your brain to rest and heal more quickly.  The best way to recover is to discuss symptoms with your healthcare provider and your family. Work closely with your healthcare provider and give your brain time to heal.    Please return to the emergency department or seek medical care if you develop:  -Severe progressive headache  -Visual changes  -Multiple episodes of vomiting  -Difficulty finding words  -Weakness in any one part of your body  -Other concerning symptoms    Patient is medically cleared for incarceration
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
## 2025-05-28 NOTE — ED NOTES
Patient AAOx4. Discharge education/summary reviewed with patient, questions/concerns addressed with pt. Patient verbalized understanding of education provided. Pt ambulatory out to lobby with a steady gait, NADN. Pt discharged to custody of [employer].
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
## 2025-08-04 ENCOUNTER — APPOINTMENT (OUTPATIENT)
Dept: RADIOLOGY | Facility: MEDICAL CENTER | Age: 25
DRG: 202 | End: 2025-08-04
Attending: EMERGENCY MEDICINE
Payer: COMMERCIAL

## 2025-08-04 ENCOUNTER — HOSPITAL ENCOUNTER (INPATIENT)
Facility: MEDICAL CENTER | Age: 25
LOS: 3 days | DRG: 202 | End: 2025-08-07
Attending: EMERGENCY MEDICINE | Admitting: STUDENT IN AN ORGANIZED HEALTH CARE EDUCATION/TRAINING PROGRAM
Payer: COMMERCIAL

## 2025-08-04 DIAGNOSIS — J45.41 MODERATE PERSISTENT ASTHMA WITH ACUTE EXACERBATION: Primary | ICD-10-CM

## 2025-08-04 DIAGNOSIS — J96.01 ACUTE HYPOXEMIC RESPIRATORY FAILURE (HCC): ICD-10-CM

## 2025-08-04 PROBLEM — R65.10 SIRS (SYSTEMIC INFLAMMATORY RESPONSE SYNDROME) (HCC): Status: ACTIVE | Noted: 2025-08-04

## 2025-08-04 LAB
ALBUMIN SERPL BCP-MCNC: 4.3 G/DL (ref 3.2–4.9)
ALBUMIN/GLOB SERPL: 1.4 G/DL
ALP SERPL-CCNC: 90 U/L (ref 30–99)
ALT SERPL-CCNC: 16 U/L (ref 2–50)
ANION GAP SERPL CALC-SCNC: 11 MMOL/L (ref 7–16)
AST SERPL-CCNC: 23 U/L (ref 12–45)
BASE EXCESS BLDV CALC-SCNC: 4 MMOL/L (ref -2–3)
BASOPHILS # BLD AUTO: 1 % (ref 0–1.8)
BASOPHILS # BLD: 0.09 K/UL (ref 0–0.12)
BILIRUB SERPL-MCNC: 0.4 MG/DL (ref 0.1–1.5)
BODY TEMPERATURE: 36.7 CENTIGRADE
BUN SERPL-MCNC: 9 MG/DL (ref 8–22)
CALCIUM ALBUM COR SERPL-MCNC: 9 MG/DL (ref 8.5–10.5)
CALCIUM SERPL-MCNC: 9.2 MG/DL (ref 8.5–10.5)
CHLORIDE SERPL-SCNC: 102 MMOL/L (ref 96–112)
CO2 SERPL-SCNC: 28 MMOL/L (ref 20–33)
CREAT SERPL-MCNC: 0.65 MG/DL (ref 0.5–1.4)
EKG IMPRESSION: NORMAL
EOSINOPHIL # BLD AUTO: 0.91 K/UL (ref 0–0.51)
EOSINOPHIL NFR BLD: 10.5 % (ref 0–6.9)
ERYTHROCYTE [DISTWIDTH] IN BLOOD BY AUTOMATED COUNT: 43.2 FL (ref 35.9–50)
FLUAV RNA SPEC QL NAA+PROBE: NEGATIVE
FLUBV RNA SPEC QL NAA+PROBE: NEGATIVE
GFR SERPLBLD CREATININE-BSD FMLA CKD-EPI: 124 ML/MIN/1.73 M 2
GLOBULIN SER CALC-MCNC: 3 G/DL (ref 1.9–3.5)
GLUCOSE SERPL-MCNC: 113 MG/DL (ref 65–99)
HCO3 BLDV-SCNC: 30 MMOL/L (ref 22–29)
HCT VFR BLD AUTO: 46.6 % (ref 37–47)
HGB BLD-MCNC: 14.9 G/DL (ref 12–16)
IMM GRANULOCYTES # BLD AUTO: 0.01 K/UL (ref 0–0.11)
IMM GRANULOCYTES NFR BLD AUTO: 0.1 % (ref 0–0.9)
LYMPHOCYTES # BLD AUTO: 2.78 K/UL (ref 1–4.8)
LYMPHOCYTES NFR BLD: 32.2 % (ref 22–41)
MAGNESIUM SERPL-MCNC: 2.2 MG/DL (ref 1.5–2.5)
MCH RBC QN AUTO: 26.8 PG (ref 27–33)
MCHC RBC AUTO-ENTMCNC: 32 G/DL (ref 32.2–35.5)
MCV RBC AUTO: 84 FL (ref 81.4–97.8)
MONOCYTES # BLD AUTO: 0.93 K/UL (ref 0–0.85)
MONOCYTES NFR BLD AUTO: 10.8 % (ref 0–13.4)
NEUTROPHILS # BLD AUTO: 3.92 K/UL (ref 1.82–7.42)
NEUTROPHILS NFR BLD: 45.4 % (ref 44–72)
NRBC # BLD AUTO: 0 K/UL
NRBC BLD-RTO: 0 /100 WBC (ref 0–0.2)
NT-PROBNP SERPL IA-MCNC: 105 PG/ML (ref 0–125)
PCO2 BLDV: 53.5 MMHG (ref 38–54)
PCO2 TEMP ADJ BLDV: 52.8 MMHG (ref 38–54)
PH BLDV: 7.35 [PH] (ref 7.31–7.45)
PH TEMP ADJ BLDV: 7.36 [PH] (ref 7.31–7.45)
PLATELET # BLD AUTO: 311 K/UL (ref 164–446)
PMV BLD AUTO: 10.2 FL (ref 9–12.9)
PO2 BLDV: 88.1 MMHG (ref 23–48)
PO2 TEMP ADJ BLDV: 86.4 MMHG (ref 23–48)
POTASSIUM SERPL-SCNC: 4.6 MMOL/L (ref 3.6–5.5)
PROCALCITONIN SERPL-MCNC: <0.05 NG/ML
PROT SERPL-MCNC: 7.3 G/DL (ref 6–8.2)
RBC # BLD AUTO: 5.55 M/UL (ref 4.2–5.4)
RSV RNA SPEC QL NAA+PROBE: NEGATIVE
SAO2 % BLDV: 95 % (ref 60–85)
SARS-COV-2 RNA RESP QL NAA+PROBE: NEGATIVE
SODIUM SERPL-SCNC: 141 MMOL/L (ref 135–145)
TROPONIN T SERPL-MCNC: <6 NG/L (ref 6–19)
WBC # BLD AUTO: 8.6 K/UL (ref 4.8–10.8)

## 2025-08-04 PROCEDURE — 700102 HCHG RX REV CODE 250 W/ 637 OVERRIDE(OP): Performed by: STUDENT IN AN ORGANIZED HEALTH CARE EDUCATION/TRAINING PROGRAM

## 2025-08-04 PROCEDURE — 700111 HCHG RX REV CODE 636 W/ 250 OVERRIDE (IP): Mod: JZ | Performed by: STUDENT IN AN ORGANIZED HEALTH CARE EDUCATION/TRAINING PROGRAM

## 2025-08-04 PROCEDURE — 94640 AIRWAY INHALATION TREATMENT: CPT

## 2025-08-04 PROCEDURE — 71045 X-RAY EXAM CHEST 1 VIEW: CPT

## 2025-08-04 PROCEDURE — 80053 COMPREHEN METABOLIC PANEL: CPT

## 2025-08-04 PROCEDURE — 0241U POC COV-2, FLU A/B, RSV BY PCR: CPT | Performed by: EMERGENCY MEDICINE

## 2025-08-04 PROCEDURE — 700101 HCHG RX REV CODE 250: Performed by: STUDENT IN AN ORGANIZED HEALTH CARE EDUCATION/TRAINING PROGRAM

## 2025-08-04 PROCEDURE — A9270 NON-COVERED ITEM OR SERVICE: HCPCS | Performed by: STUDENT IN AN ORGANIZED HEALTH CARE EDUCATION/TRAINING PROGRAM

## 2025-08-04 PROCEDURE — 94644 CONT INHLJ TX 1ST HOUR: CPT

## 2025-08-04 PROCEDURE — 99223 1ST HOSP IP/OBS HIGH 75: CPT | Performed by: STUDENT IN AN ORGANIZED HEALTH CARE EDUCATION/TRAINING PROGRAM

## 2025-08-04 PROCEDURE — 700111 HCHG RX REV CODE 636 W/ 250 OVERRIDE (IP): Mod: UD | Performed by: EMERGENCY MEDICINE

## 2025-08-04 PROCEDURE — 770020 HCHG ROOM/CARE - TELE (206)

## 2025-08-04 PROCEDURE — 36415 COLL VENOUS BLD VENIPUNCTURE: CPT

## 2025-08-04 PROCEDURE — 700101 HCHG RX REV CODE 250: Mod: UD

## 2025-08-04 PROCEDURE — 83735 ASSAY OF MAGNESIUM: CPT

## 2025-08-04 PROCEDURE — 84145 PROCALCITONIN (PCT): CPT

## 2025-08-04 PROCEDURE — 84484 ASSAY OF TROPONIN QUANT: CPT

## 2025-08-04 PROCEDURE — 83880 ASSAY OF NATRIURETIC PEPTIDE: CPT

## 2025-08-04 PROCEDURE — 96365 THER/PROPH/DIAG IV INF INIT: CPT

## 2025-08-04 PROCEDURE — 96366 THER/PROPH/DIAG IV INF ADDON: CPT

## 2025-08-04 PROCEDURE — 93005 ELECTROCARDIOGRAM TRACING: CPT | Mod: TC | Performed by: EMERGENCY MEDICINE

## 2025-08-04 PROCEDURE — 99285 EMERGENCY DEPT VISIT HI MDM: CPT

## 2025-08-04 PROCEDURE — 85025 COMPLETE CBC W/AUTO DIFF WBC: CPT

## 2025-08-04 PROCEDURE — 96375 TX/PRO/DX INJ NEW DRUG ADDON: CPT

## 2025-08-04 PROCEDURE — 82803 BLOOD GASES ANY COMBINATION: CPT

## 2025-08-04 RX ORDER — METHYLPREDNISOLONE SODIUM SUCCINATE 40 MG/ML
40 INJECTION, POWDER, LYOPHILIZED, FOR SOLUTION INTRAMUSCULAR; INTRAVENOUS ONCE
Status: COMPLETED | OUTPATIENT
Start: 2025-08-04 | End: 2025-08-04

## 2025-08-04 RX ORDER — MAGNESIUM SULFATE HEPTAHYDRATE 40 MG/ML
2 INJECTION, SOLUTION INTRAVENOUS ONCE
Status: COMPLETED | OUTPATIENT
Start: 2025-08-04 | End: 2025-08-04

## 2025-08-04 RX ORDER — ALBUTEROL SULFATE 5 MG/ML
2.5 SOLUTION RESPIRATORY (INHALATION)
Status: DISCONTINUED | OUTPATIENT
Start: 2025-08-04 | End: 2025-08-05

## 2025-08-04 RX ORDER — ONDANSETRON 4 MG/1
4 TABLET, ORALLY DISINTEGRATING ORAL EVERY 4 HOURS PRN
Status: DISCONTINUED | OUTPATIENT
Start: 2025-08-04 | End: 2025-08-07 | Stop reason: HOSPADM

## 2025-08-04 RX ORDER — ACETAMINOPHEN 325 MG/1
650 TABLET ORAL EVERY 6 HOURS PRN
Status: DISCONTINUED | OUTPATIENT
Start: 2025-08-04 | End: 2025-08-07 | Stop reason: HOSPADM

## 2025-08-04 RX ORDER — ALBUTEROL SULFATE 5 MG/ML
SOLUTION RESPIRATORY (INHALATION)
Status: COMPLETED
Start: 2025-08-04 | End: 2025-08-04

## 2025-08-04 RX ORDER — ENOXAPARIN SODIUM 100 MG/ML
40 INJECTION SUBCUTANEOUS DAILY
Status: DISCONTINUED | OUTPATIENT
Start: 2025-08-04 | End: 2025-08-07 | Stop reason: HOSPADM

## 2025-08-04 RX ORDER — METHYLPREDNISOLONE SODIUM SUCCINATE 40 MG/ML
40 INJECTION, POWDER, LYOPHILIZED, FOR SOLUTION INTRAMUSCULAR; INTRAVENOUS EVERY 6 HOURS
Status: DISCONTINUED | OUTPATIENT
Start: 2025-08-04 | End: 2025-08-05

## 2025-08-04 RX ORDER — ONDANSETRON 2 MG/ML
4 INJECTION INTRAMUSCULAR; INTRAVENOUS EVERY 4 HOURS PRN
Status: DISCONTINUED | OUTPATIENT
Start: 2025-08-04 | End: 2025-08-07 | Stop reason: HOSPADM

## 2025-08-04 RX ORDER — PROMETHAZINE HYDROCHLORIDE 25 MG/1
12.5-25 TABLET ORAL EVERY 4 HOURS PRN
Status: DISCONTINUED | OUTPATIENT
Start: 2025-08-04 | End: 2025-08-07 | Stop reason: HOSPADM

## 2025-08-04 RX ORDER — LABETALOL HYDROCHLORIDE 5 MG/ML
10 INJECTION, SOLUTION INTRAVENOUS EVERY 4 HOURS PRN
Status: DISCONTINUED | OUTPATIENT
Start: 2025-08-04 | End: 2025-08-07 | Stop reason: HOSPADM

## 2025-08-04 RX ORDER — ALBUTEROL SULFATE 5 MG/ML
15 SOLUTION RESPIRATORY (INHALATION)
Status: COMPLETED | OUTPATIENT
Start: 2025-08-04 | End: 2025-08-04

## 2025-08-04 RX ORDER — PROMETHAZINE HYDROCHLORIDE 25 MG/1
12.5-25 SUPPOSITORY RECTAL EVERY 4 HOURS PRN
Status: DISCONTINUED | OUTPATIENT
Start: 2025-08-04 | End: 2025-08-07 | Stop reason: HOSPADM

## 2025-08-04 RX ORDER — PROCHLORPERAZINE EDISYLATE 5 MG/ML
5-10 INJECTION INTRAMUSCULAR; INTRAVENOUS EVERY 4 HOURS PRN
Status: DISCONTINUED | OUTPATIENT
Start: 2025-08-04 | End: 2025-08-07 | Stop reason: HOSPADM

## 2025-08-04 RX ADMIN — ALBUTEROL SULFATE 2.5 MG: 2.5 SOLUTION RESPIRATORY (INHALATION) at 20:03

## 2025-08-04 RX ADMIN — METHYLPREDNISOLONE SODIUM SUCCINATE 40 MG: 40 INJECTION, POWDER, FOR SOLUTION INTRAMUSCULAR; INTRAVENOUS at 22:25

## 2025-08-04 RX ADMIN — MAGNESIUM SULFATE HEPTAHYDRATE 2 G: 2 INJECTION, SOLUTION INTRAVENOUS at 16:00

## 2025-08-04 RX ADMIN — ALBUTEROL SULFATE 15 MG: 2.5 SOLUTION RESPIRATORY (INHALATION) at 15:47

## 2025-08-04 RX ADMIN — ALBUTEROL SULFATE 15 MG: 5 SOLUTION RESPIRATORY (INHALATION) at 15:47

## 2025-08-04 RX ADMIN — ALBUTEROL SULFATE 2.5 MG: 2.5 SOLUTION RESPIRATORY (INHALATION) at 22:24

## 2025-08-04 RX ADMIN — ACETAMINOPHEN 650 MG: 325 TABLET ORAL at 20:18

## 2025-08-04 RX ADMIN — METHYLPREDNISOLONE SODIUM SUCCINATE 40 MG: 40 INJECTION, POWDER, FOR SOLUTION INTRAMUSCULAR; INTRAVENOUS at 15:55

## 2025-08-04 ASSESSMENT — FIBROSIS 4 INDEX
FIB4 SCORE: 0.48
FIB4 SCORE: 0.23
FIB4 SCORE: 0.48

## 2025-08-04 ASSESSMENT — LIFESTYLE VARIABLES
HOW MANY TIMES IN THE PAST YEAR HAVE YOU HAD 5 OR MORE DRINKS IN A DAY: 0
EVER FELT BAD OR GUILTY ABOUT YOUR DRINKING: NO
AVERAGE NUMBER OF DAYS PER WEEK YOU HAVE A DRINK CONTAINING ALCOHOL: 0
TOTAL SCORE: 0
DOES PATIENT WANT TO STOP DRINKING: CANNOT ASSESS
EVER HAD A DRINK FIRST THING IN THE MORNING TO STEADY YOUR NERVES TO GET RID OF A HANGOVER: NO
HAVE YOU EVER FELT YOU SHOULD CUT DOWN ON YOUR DRINKING: NO
TOTAL SCORE: 0
HAVE PEOPLE ANNOYED YOU BY CRITICIZING YOUR DRINKING: NO
TOTAL SCORE: 0
ALCOHOL_USE: NO
ON A TYPICAL DAY WHEN YOU DRINK ALCOHOL HOW MANY DRINKS DO YOU HAVE: 0
CONSUMPTION TOTAL: NEGATIVE

## 2025-08-04 ASSESSMENT — PAIN DESCRIPTION - PAIN TYPE
TYPE: CHRONIC PAIN

## 2025-08-04 ASSESSMENT — PATIENT HEALTH QUESTIONNAIRE - PHQ9
1. LITTLE INTEREST OR PLEASURE IN DOING THINGS: NOT AT ALL
2. FEELING DOWN, DEPRESSED, IRRITABLE, OR HOPELESS: NOT AT ALL
SUM OF ALL RESPONSES TO PHQ9 QUESTIONS 1 AND 2: 0

## 2025-08-05 LAB
AMPHET UR QL SCN: POSITIVE
BARBITURATES UR QL SCN: NEGATIVE
BENZODIAZ UR QL SCN: NEGATIVE
BZE UR QL SCN: POSITIVE
CANNABINOIDS UR QL SCN: NEGATIVE
FENTANYL UR QL: POSITIVE
METHADONE UR QL SCN: NEGATIVE
OPIATES UR QL SCN: POSITIVE
OXYCODONE UR QL SCN: NEGATIVE
PCP UR QL SCN: NEGATIVE
PROPOXYPH UR QL SCN: NEGATIVE

## 2025-08-05 PROCEDURE — 700102 HCHG RX REV CODE 250 W/ 637 OVERRIDE(OP)

## 2025-08-05 PROCEDURE — 80307 DRUG TEST PRSMV CHEM ANLYZR: CPT

## 2025-08-05 PROCEDURE — 700111 HCHG RX REV CODE 636 W/ 250 OVERRIDE (IP): Mod: JZ | Performed by: STUDENT IN AN ORGANIZED HEALTH CARE EDUCATION/TRAINING PROGRAM

## 2025-08-05 PROCEDURE — 700111 HCHG RX REV CODE 636 W/ 250 OVERRIDE (IP)

## 2025-08-05 PROCEDURE — 99232 SBSQ HOSP IP/OBS MODERATE 35: CPT | Performed by: INTERNAL MEDICINE

## 2025-08-05 PROCEDURE — 94640 AIRWAY INHALATION TREATMENT: CPT

## 2025-08-05 PROCEDURE — 99406 BEHAV CHNG SMOKING 3-10 MIN: CPT

## 2025-08-05 PROCEDURE — 770020 HCHG ROOM/CARE - TELE (206)

## 2025-08-05 PROCEDURE — 700101 HCHG RX REV CODE 250: Performed by: STUDENT IN AN ORGANIZED HEALTH CARE EDUCATION/TRAINING PROGRAM

## 2025-08-05 PROCEDURE — A9270 NON-COVERED ITEM OR SERVICE: HCPCS

## 2025-08-05 PROCEDURE — 700102 HCHG RX REV CODE 250 W/ 637 OVERRIDE(OP): Performed by: STUDENT IN AN ORGANIZED HEALTH CARE EDUCATION/TRAINING PROGRAM

## 2025-08-05 PROCEDURE — 700101 HCHG RX REV CODE 250

## 2025-08-05 PROCEDURE — A9270 NON-COVERED ITEM OR SERVICE: HCPCS | Performed by: STUDENT IN AN ORGANIZED HEALTH CARE EDUCATION/TRAINING PROGRAM

## 2025-08-05 RX ORDER — IPRATROPIUM BROMIDE AND ALBUTEROL SULFATE 2.5; .5 MG/3ML; MG/3ML
3 SOLUTION RESPIRATORY (INHALATION)
Status: DISCONTINUED | OUTPATIENT
Start: 2025-08-05 | End: 2025-08-06

## 2025-08-05 RX ORDER — BENZONATATE 100 MG/1
100 CAPSULE ORAL 3 TIMES DAILY PRN
Status: DISCONTINUED | OUTPATIENT
Start: 2025-08-05 | End: 2025-08-07 | Stop reason: HOSPADM

## 2025-08-05 RX ORDER — PREDNISONE 20 MG/1
40 TABLET ORAL DAILY
Status: DISCONTINUED | OUTPATIENT
Start: 2025-08-05 | End: 2025-08-05

## 2025-08-05 RX ADMIN — ACETAMINOPHEN 650 MG: 325 TABLET ORAL at 08:51

## 2025-08-05 RX ADMIN — IPRATROPIUM BROMIDE AND ALBUTEROL SULFATE 3 ML: .5; 2.5 SOLUTION RESPIRATORY (INHALATION) at 10:51

## 2025-08-05 RX ADMIN — IPRATROPIUM BROMIDE AND ALBUTEROL SULFATE 3 ML: .5; 2.5 SOLUTION RESPIRATORY (INHALATION) at 15:18

## 2025-08-05 RX ADMIN — IPRATROPIUM BROMIDE AND ALBUTEROL SULFATE 3 ML: .5; 2.5 SOLUTION RESPIRATORY (INHALATION) at 18:35

## 2025-08-05 RX ADMIN — PREDNISONE 40 MG: 20 TABLET ORAL at 10:57

## 2025-08-05 RX ADMIN — ALBUTEROL SULFATE 2.5 MG: 2.5 SOLUTION RESPIRATORY (INHALATION) at 02:18

## 2025-08-05 RX ADMIN — ALBUTEROL SULFATE 2.5 MG: 2.5 SOLUTION RESPIRATORY (INHALATION) at 07:02

## 2025-08-05 RX ADMIN — METHYLPREDNISOLONE SODIUM SUCCINATE 40 MG: 40 INJECTION, POWDER, FOR SOLUTION INTRAMUSCULAR; INTRAVENOUS at 04:05

## 2025-08-05 RX ADMIN — IPRATROPIUM BROMIDE AND ALBUTEROL SULFATE 3 ML: .5; 2.5 SOLUTION RESPIRATORY (INHALATION) at 22:36

## 2025-08-05 RX ADMIN — BENZONATATE 100 MG: 100 CAPSULE ORAL at 09:16

## 2025-08-05 ASSESSMENT — PAIN DESCRIPTION - PAIN TYPE
TYPE: ACUTE PAIN
TYPE: ACUTE PAIN

## 2025-08-05 ASSESSMENT — COGNITIVE AND FUNCTIONAL STATUS - GENERAL
SUGGESTED CMS G CODE MODIFIER MOBILITY: CH
SUGGESTED CMS G CODE MODIFIER DAILY ACTIVITY: CH
DAILY ACTIVITIY SCORE: 24
MOBILITY SCORE: 24

## 2025-08-05 ASSESSMENT — ENCOUNTER SYMPTOMS
CONSTITUTIONAL NEGATIVE: 1
HALLUCINATIONS: 0
ORTHOPNEA: 0
EYES NEGATIVE: 1
WHEEZING: 1
MUSCULOSKELETAL NEGATIVE: 1
SHORTNESS OF BREATH: 1
COUGH: 1
GASTROINTESTINAL NEGATIVE: 1
NERVOUS/ANXIOUS: 1
NEUROLOGICAL NEGATIVE: 1
CARDIOVASCULAR NEGATIVE: 1
CLAUDICATION: 0

## 2025-08-05 ASSESSMENT — LIFESTYLE VARIABLES: SUBSTANCE_ABUSE: 1

## 2025-08-05 ASSESSMENT — FIBROSIS 4 INDEX: FIB4 SCORE: 0.48

## 2025-08-06 LAB
ANION GAP SERPL CALC-SCNC: 10 MMOL/L (ref 7–16)
BUN SERPL-MCNC: 20 MG/DL (ref 8–22)
CALCIUM SERPL-MCNC: 9.6 MG/DL (ref 8.5–10.5)
CHLORIDE SERPL-SCNC: 98 MMOL/L (ref 96–112)
CO2 SERPL-SCNC: 27 MMOL/L (ref 20–33)
CREAT SERPL-MCNC: 0.68 MG/DL (ref 0.5–1.4)
ERYTHROCYTE [DISTWIDTH] IN BLOOD BY AUTOMATED COUNT: 43.4 FL (ref 35.9–50)
GFR SERPLBLD CREATININE-BSD FMLA CKD-EPI: 123 ML/MIN/1.73 M 2
GLUCOSE SERPL-MCNC: 120 MG/DL (ref 65–99)
HCT VFR BLD AUTO: 44.5 % (ref 37–47)
HGB BLD-MCNC: 14.5 G/DL (ref 12–16)
MCH RBC QN AUTO: 27 PG (ref 27–33)
MCHC RBC AUTO-ENTMCNC: 32.6 G/DL (ref 32.2–35.5)
MCV RBC AUTO: 82.9 FL (ref 81.4–97.8)
PLATELET # BLD AUTO: 316 K/UL (ref 164–446)
PMV BLD AUTO: 10.5 FL (ref 9–12.9)
POTASSIUM SERPL-SCNC: 4.9 MMOL/L (ref 3.6–5.5)
RBC # BLD AUTO: 5.37 M/UL (ref 4.2–5.4)
SODIUM SERPL-SCNC: 135 MMOL/L (ref 135–145)
WBC # BLD AUTO: 13.4 K/UL (ref 4.8–10.8)

## 2025-08-06 PROCEDURE — A9270 NON-COVERED ITEM OR SERVICE: HCPCS

## 2025-08-06 PROCEDURE — 700111 HCHG RX REV CODE 636 W/ 250 OVERRIDE (IP)

## 2025-08-06 PROCEDURE — 85027 COMPLETE CBC AUTOMATED: CPT

## 2025-08-06 PROCEDURE — 700101 HCHG RX REV CODE 250

## 2025-08-06 PROCEDURE — 770001 HCHG ROOM/CARE - MED/SURG/GYN PRIV*

## 2025-08-06 PROCEDURE — 99232 SBSQ HOSP IP/OBS MODERATE 35: CPT | Performed by: INTERNAL MEDICINE

## 2025-08-06 PROCEDURE — 94640 AIRWAY INHALATION TREATMENT: CPT

## 2025-08-06 PROCEDURE — 80048 BASIC METABOLIC PNL TOTAL CA: CPT

## 2025-08-06 PROCEDURE — 700102 HCHG RX REV CODE 250 W/ 637 OVERRIDE(OP)

## 2025-08-06 RX ORDER — IPRATROPIUM BROMIDE AND ALBUTEROL SULFATE 2.5; .5 MG/3ML; MG/3ML
3 SOLUTION RESPIRATORY (INHALATION) 4 TIMES DAILY
Status: DISCONTINUED | OUTPATIENT
Start: 2025-08-06 | End: 2025-08-07

## 2025-08-06 RX ORDER — GUAIFENESIN/DEXTROMETHORPHAN 100-10MG/5
5 SYRUP ORAL EVERY 6 HOURS PRN
Status: DISCONTINUED | OUTPATIENT
Start: 2025-08-06 | End: 2025-08-07 | Stop reason: HOSPADM

## 2025-08-06 RX ADMIN — IPRATROPIUM BROMIDE AND ALBUTEROL SULFATE 3 ML: .5; 2.5 SOLUTION RESPIRATORY (INHALATION) at 19:32

## 2025-08-06 RX ADMIN — IPRATROPIUM BROMIDE AND ALBUTEROL SULFATE 3 ML: .5; 2.5 SOLUTION RESPIRATORY (INHALATION) at 06:33

## 2025-08-06 RX ADMIN — PREDNISONE 60 MG: 50 TABLET ORAL at 05:03

## 2025-08-06 RX ADMIN — BENZONATATE 100 MG: 100 CAPSULE ORAL at 07:28

## 2025-08-06 RX ADMIN — IPRATROPIUM BROMIDE AND ALBUTEROL SULFATE 3 ML: .5; 2.5 SOLUTION RESPIRATORY (INHALATION) at 14:07

## 2025-08-06 ASSESSMENT — ENCOUNTER SYMPTOMS
EYES NEGATIVE: 1
ORTHOPNEA: 0
GASTROINTESTINAL NEGATIVE: 1
FEVER: 0
DIZZINESS: 0
SHORTNESS OF BREATH: 1
NERVOUS/ANXIOUS: 1
CHILLS: 0
NEUROLOGICAL NEGATIVE: 1
VOMITING: 0
CONSTITUTIONAL NEGATIVE: 1
CARDIOVASCULAR NEGATIVE: 1
NAUSEA: 0
CLAUDICATION: 0
HEARTBURN: 0
ABDOMINAL PAIN: 0
WHEEZING: 1
HALLUCINATIONS: 0
COUGH: 1
MUSCULOSKELETAL NEGATIVE: 1
HEADACHES: 0

## 2025-08-06 ASSESSMENT — PATIENT HEALTH QUESTIONNAIRE - PHQ9
2. FEELING DOWN, DEPRESSED, IRRITABLE, OR HOPELESS: NOT AT ALL
SUM OF ALL RESPONSES TO PHQ9 QUESTIONS 1 AND 2: 0
SUM OF ALL RESPONSES TO PHQ9 QUESTIONS 1 AND 2: 0
1. LITTLE INTEREST OR PLEASURE IN DOING THINGS: NOT AT ALL
2. FEELING DOWN, DEPRESSED, IRRITABLE, OR HOPELESS: NOT AT ALL
1. LITTLE INTEREST OR PLEASURE IN DOING THINGS: NOT AT ALL

## 2025-08-06 ASSESSMENT — PAIN DESCRIPTION - PAIN TYPE
TYPE: ACUTE PAIN
TYPE: ACUTE PAIN

## 2025-08-06 ASSESSMENT — LIFESTYLE VARIABLES: SUBSTANCE_ABUSE: 1

## 2025-08-07 ENCOUNTER — PHARMACY VISIT (OUTPATIENT)
Dept: PHARMACY | Facility: MEDICAL CENTER | Age: 25
End: 2025-08-07
Payer: MEDICARE

## 2025-08-07 VITALS
HEART RATE: 103 BPM | BODY MASS INDEX: 18.46 KG/M2 | HEIGHT: 66 IN | DIASTOLIC BLOOD PRESSURE: 60 MMHG | OXYGEN SATURATION: 92 % | TEMPERATURE: 98.2 F | RESPIRATION RATE: 18 BRPM | SYSTOLIC BLOOD PRESSURE: 119 MMHG | WEIGHT: 114.86 LBS

## 2025-08-07 LAB
ANION GAP SERPL CALC-SCNC: 14 MMOL/L (ref 7–16)
BUN SERPL-MCNC: 24 MG/DL (ref 8–22)
CALCIUM SERPL-MCNC: 9.2 MG/DL (ref 8.5–10.5)
CHLORIDE SERPL-SCNC: 97 MMOL/L (ref 96–112)
CO2 SERPL-SCNC: 24 MMOL/L (ref 20–33)
CREAT SERPL-MCNC: 0.81 MG/DL (ref 0.5–1.4)
GFR SERPLBLD CREATININE-BSD FMLA CKD-EPI: 103 ML/MIN/1.73 M 2
GLUCOSE SERPL-MCNC: 97 MG/DL (ref 65–99)
POTASSIUM SERPL-SCNC: 4.9 MMOL/L (ref 3.6–5.5)
SODIUM SERPL-SCNC: 135 MMOL/L (ref 135–145)

## 2025-08-07 PROCEDURE — A9270 NON-COVERED ITEM OR SERVICE: HCPCS

## 2025-08-07 PROCEDURE — 700101 HCHG RX REV CODE 250

## 2025-08-07 PROCEDURE — 94640 AIRWAY INHALATION TREATMENT: CPT

## 2025-08-07 PROCEDURE — 700111 HCHG RX REV CODE 636 W/ 250 OVERRIDE (IP)

## 2025-08-07 PROCEDURE — 700102 HCHG RX REV CODE 250 W/ 637 OVERRIDE(OP)

## 2025-08-07 PROCEDURE — RXMED WILLOW AMBULATORY MEDICATION CHARGE

## 2025-08-07 PROCEDURE — 99239 HOSP IP/OBS DSCHRG MGMT >30: CPT | Performed by: INTERNAL MEDICINE

## 2025-08-07 PROCEDURE — 80048 BASIC METABOLIC PNL TOTAL CA: CPT

## 2025-08-07 RX ORDER — PREDNISONE 20 MG/1
20 TABLET ORAL DAILY
Qty: 4 TABLET | Refills: 0 | Status: SHIPPED | OUTPATIENT
Start: 2025-08-07 | End: 2025-08-11

## 2025-08-07 RX ORDER — FLUTICASONE PROPIONATE AND SALMETEROL 100; 50 UG/1; UG/1
1 POWDER RESPIRATORY (INHALATION) 2 TIMES DAILY
Qty: 60 EACH | Refills: 1 | Status: SHIPPED | OUTPATIENT
Start: 2025-08-07

## 2025-08-07 RX ORDER — ALBUTEROL SULFATE 90 UG/1
2 INHALANT RESPIRATORY (INHALATION) EVERY 6 HOURS PRN
Qty: 8.5 G | Refills: 3 | Status: SHIPPED | OUTPATIENT
Start: 2025-08-07

## 2025-08-07 RX ORDER — IPRATROPIUM BROMIDE AND ALBUTEROL SULFATE 2.5; .5 MG/3ML; MG/3ML
3 SOLUTION RESPIRATORY (INHALATION)
Status: DISCONTINUED | OUTPATIENT
Start: 2025-08-07 | End: 2025-08-07 | Stop reason: HOSPADM

## 2025-08-07 RX ORDER — BENZONATATE 100 MG/1
100 CAPSULE ORAL 3 TIMES DAILY PRN
Qty: 30 CAPSULE | Refills: 0 | Status: SHIPPED | OUTPATIENT
Start: 2025-08-07

## 2025-08-07 RX ORDER — HYDROXYZINE HYDROCHLORIDE 25 MG/1
25 TABLET, FILM COATED ORAL ONCE
Status: COMPLETED | OUTPATIENT
Start: 2025-08-07 | End: 2025-08-07

## 2025-08-07 RX ADMIN — IPRATROPIUM BROMIDE AND ALBUTEROL SULFATE 3 ML: .5; 2.5 SOLUTION RESPIRATORY (INHALATION) at 06:55

## 2025-08-07 RX ADMIN — BENZONATATE 100 MG: 100 CAPSULE ORAL at 05:54

## 2025-08-07 RX ADMIN — PREDNISONE 60 MG: 50 TABLET ORAL at 05:53

## 2025-08-07 RX ADMIN — IPRATROPIUM BROMIDE AND ALBUTEROL SULFATE 3 ML: .5; 2.5 SOLUTION RESPIRATORY (INHALATION) at 10:06

## 2025-08-07 RX ADMIN — GUAIFENESIN AND DEXTROMETHORPHAN 5 ML: 10; 100 SYRUP ORAL at 00:13

## 2025-08-07 RX ADMIN — HYDROXYZINE HYDROCHLORIDE 25 MG: 25 TABLET ORAL at 02:09

## 2025-08-07 ASSESSMENT — PAIN DESCRIPTION - PAIN TYPE: TYPE: ACUTE PAIN
